# Patient Record
Sex: FEMALE | Race: WHITE | NOT HISPANIC OR LATINO | Employment: OTHER | URBAN - METROPOLITAN AREA
[De-identification: names, ages, dates, MRNs, and addresses within clinical notes are randomized per-mention and may not be internally consistent; named-entity substitution may affect disease eponyms.]

---

## 2017-02-08 DIAGNOSIS — Z01.419 ENCOUNTER FOR GYNECOLOGICAL EXAMINATION WITHOUT ABNORMAL FINDING: ICD-10-CM

## 2017-03-27 ENCOUNTER — ALLSCRIPTS OFFICE VISIT (OUTPATIENT)
Dept: OTHER | Facility: OTHER | Age: 72
End: 2017-03-27

## 2017-04-06 ENCOUNTER — GENERIC CONVERSION - ENCOUNTER (OUTPATIENT)
Dept: OTHER | Facility: OTHER | Age: 72
End: 2017-04-06

## 2017-05-04 ENCOUNTER — HOSPITAL ENCOUNTER (OUTPATIENT)
Dept: RADIOLOGY | Facility: MEDICAL CENTER | Age: 72
Discharge: HOME | End: 2017-05-04
Payer: COMMERCIAL

## 2017-05-04 DIAGNOSIS — Z01.419 ENCOUNTER FOR GYNECOLOGICAL EXAMINATION WITHOUT ABNORMAL FINDING: ICD-10-CM

## 2017-05-04 PROCEDURE — G0202 SCR MAMMO BI INCL CAD: HCPCS

## 2017-05-08 ENCOUNTER — ALLSCRIPTS OFFICE VISIT (OUTPATIENT)
Dept: OTHER | Facility: OTHER | Age: 72
End: 2017-05-08

## 2017-09-08 DIAGNOSIS — Z00.00 ENCOUNTER FOR GENERAL ADULT MEDICAL EXAMINATION WITHOUT ABNORMAL FINDINGS: ICD-10-CM

## 2017-09-13 ENCOUNTER — GENERIC CONVERSION - ENCOUNTER (OUTPATIENT)
Dept: OTHER | Facility: OTHER | Age: 72
End: 2017-09-13

## 2017-09-13 LAB
25(OH)D3 SERPL-MCNC: 46 NG/ML (ref 30–100)
A/G RATIO (HISTORICAL): 1.4 (CALC) (ref 1–2.5)
ALBUMIN SERPL BCP-MCNC: 4 G/DL (ref 3.6–5.1)
ALP SERPL-CCNC: 112 U/L (ref 33–130)
ALT SERPL W P-5'-P-CCNC: 18 U/L (ref 6–29)
AST SERPL W P-5'-P-CCNC: 20 U/L (ref 10–35)
BILIRUB SERPL-MCNC: 0.6 MG/DL (ref 0.2–1.2)
BUN SERPL-MCNC: 17 MG/DL (ref 7–25)
BUN/CREA RATIO (HISTORICAL): NORMAL (CALC) (ref 6–22)
CALCIUM (ADJUSTED FOR ALBUMIN) (HISTORICAL): 9.9 MG/DL (CALC) (ref 8.6–10.2)
CALCIUM SERPL-MCNC: 9.6 MG/DL (ref 8.6–10.4)
CHLORIDE SERPL-SCNC: 101 MMOL/L (ref 98–110)
CHOLEST SERPL-MCNC: 195 MG/DL
CHOLEST/HDLC SERPL: 3 (CALC)
CO2 SERPL-SCNC: 29 MMOL/L (ref 20–31)
CREAT SERPL-MCNC: 0.81 MG/DL (ref 0.6–0.93)
EGFR AFRICAN AMERICAN (HISTORICAL): 85 ML/MIN/1.73M2
EGFR-AMERICAN CALC (HISTORICAL): 73 ML/MIN/1.73M2
GAMMA GLOBULIN (HISTORICAL): 2.8 G/DL (CALC) (ref 1.9–3.7)
GLUCOSE (HISTORICAL): 87 MG/DL (ref 65–99)
HDLC SERPL-MCNC: 65 MG/DL
LDL CHOLESTEROL (HISTORICAL): 109 MG/DL (CALC)
NON-HDL-CHOL (CHOL-HDL) (HISTORICAL): 130 MG/DL (CALC)
POTASSIUM SERPL-SCNC: 3.8 MMOL/L (ref 3.5–5.3)
SODIUM SERPL-SCNC: 138 MMOL/L (ref 135–146)
T4 FREE SERPL-MCNC: 1.3 NG/DL (ref 0.8–1.8)
TOTAL PROTEIN (HISTORICAL): 6.8 G/DL (ref 6.1–8.1)
TRIGL SERPL-MCNC: 105 MG/DL
TSH SERPL DL<=0.05 MIU/L-ACNC: 5.06 MIU/L (ref 0.4–4.5)

## 2017-09-27 ENCOUNTER — GENERIC CONVERSION - ENCOUNTER (OUTPATIENT)
Dept: OTHER | Facility: OTHER | Age: 72
End: 2017-09-27

## 2017-09-27 PROCEDURE — G0145 SCR C/V CYTO,THINLAYER,RESCR: HCPCS | Performed by: OBSTETRICS & GYNECOLOGY

## 2017-09-28 ENCOUNTER — LAB REQUISITION (OUTPATIENT)
Dept: LAB | Facility: HOSPITAL | Age: 72
End: 2017-09-28
Payer: COMMERCIAL

## 2017-09-28 DIAGNOSIS — Z01.419 ENCOUNTER FOR GYNECOLOGICAL EXAMINATION WITHOUT ABNORMAL FINDING: ICD-10-CM

## 2017-10-16 LAB
LAB AP GYN PRIMARY INTERPRETATION: NORMAL
Lab: NORMAL

## 2017-10-17 ENCOUNTER — GENERIC CONVERSION - ENCOUNTER (OUTPATIENT)
Dept: OTHER | Facility: OTHER | Age: 72
End: 2017-10-17

## 2018-01-09 NOTE — RESULT NOTES
Verified Results  ECHO COMPLETE WITH CONTRAST IF INDICATED, TTE / TRANSTHORACIC [id number] 09:48AM Didi Saucedo     Test Name Result Flag Reference   ECHO COMPLETE WITH CONTRAST IF INDICATED (Report)     Benson Jackson   [address], [address] [facility]   [phone]     Transthoracic Echocardiogram   2D, M-mode, Doppler, and Color Doppler     Study date: 2016     Patient: Angelic Pollard   MR number: [id number]   Account number: [de-identified]   : 1945   Age: 72 years   Gender: Female   Status: Outpatient   Location: Bedside   Height: 61 in   Weight: 159 lb   BP: 140/ 62 mmHg     Indications: Palpitations  Diagnoses: R00 2 - Palpitations     Sonographer: ADORE Weinstein   Primary Physician: Franci Robb DO   Group: [facility]   Interpreting Physician: Mendel Coon, MD     SUMMARY     LEFT VENTRICLE:   Systolic function was normal by visual assessment  Ejection fraction was   estimated to be 70 %  There were no regional wall motion abnormalities  RIGHT VENTRICLE:   The size was normal    Systolic function was normal      MITRAL VALVE:   There was mild regurgitation  TRICUSPID VALVE:   There was mild to moderate regurgitation  PULMONIC VALVE:   There was trace regurgitation  IVC, HEPATIC VEINS:   Respirophasic changes were blunted (less than 50% variation)  HISTORY: PRIOR HISTORY: Risk factors: hypertension  PROCEDURE: The procedure was performed at the bedside  This was a routine   study  The transthoracic approach was used  The study included complete 2D   imaging, M-mode, complete spectral Doppler, and color Doppler  The heart rate   was 60 bpm, at the start of the study  Images were obtained from the   parasternal, apical, subcostal, and suprasternal notch acoustic windows  Echocardiographic views were limited due to poor acoustic window availability,   decreased penetration, and lung interference   This was a technically difficult   study  LEFT VENTRICLE: Size was normal  Systolic function was normal by visual   assessment  Ejection fraction was estimated to be 70 %  There were no regional   wall motion abnormalities  Wall thickness was normal  DOPPLER: Left ventricular   diastolic function parameters were normal      RIGHT VENTRICLE: The size was normal  Systolic function was normal  Wall   thickness was normal      LEFT ATRIUM: Size was normal      RIGHT ATRIUM: Size was normal      MITRAL VALVE: Valve structure was normal  There was normal leaflet separation  DOPPLER: The transmitral velocity was within the normal range  There was no   evidence for stenosis  There was mild regurgitation  AORTIC VALVE: The valve was trileaflet  Leaflets exhibited normal thickness and   normal cuspal separation  DOPPLER: Transaortic velocity was within the normal   range  There was no evidence for stenosis  There was no significant   regurgitation  TRICUSPID VALVE: The valve structure was normal  There was normal leaflet   separation  DOPPLER: The transtricuspid velocity was within the normal range  There was no evidence for stenosis  There was mild to moderate regurgitation  Estimated peak PA pressure was 25 mmHg  PULMONIC VALVE: Not well visualized  DOPPLER: The transpulmonic velocity was   within the normal range  There was trace regurgitation  PERICARDIUM: There was no pericardial effusion  The pericardium was normal in   appearance  AORTA: The root exhibited normal size  SYSTEMIC VEINS: IVC: The inferior vena cava was normal in size  Respirophasic   changes were blunted (less than 50% variation)       MEASUREMENT TABLES     OTHER ECHO MEASUREMENTS   (Reference normals)   Estimated CVP  8 mmHg  (--)     SYSTEM MEASUREMENT TABLES     2D mode   AV Diam: 3 5 cm   AoR Diam; Mean (2D): 3 5 cm   LA Diam (2D): 3 4 cm   LA Dimension; Mean (2D): 3 4 cm   LA/Ao (2D): 0 97   EDV (2D-Cubed): 41 8 cm3   EF (2D-Cubed): 77 5 %   ESV (2D-Cubed): 9 39 cm3   FS (2D-Cubed): 39 2 %   FS (2D-Teich): 39 2 %   IVS/LVPW (2D): 1 09   IVSd (2D): 0 9 cm   IVSd; Mean chosen (2D): 0 9 cm   LVIDd (2D): 3 47 cm   LVIDd; Mean (2D): 3 47 cm   LVIDs (2D): 2 11 cm   LVIDs; Mean (2D): 2 11 cm   LVPWd (2D): 0 83 cm   LVPWd; Mean (2D): 0 83 cm   Left Ventricular Ejection Fraction; Teichholz; 2D mode;: 70 7 %   Left Ventricular End Diastolic Volume; Teichholz; 2D mode;: 49 8 cm3   Left Ventricular End Systolic Volume; Teichholz; 2D mode;: 14 6 cm3   SV (2D-Cubed): 32 4 cm3   Stroke Volume; Teichholz; 2D mode;: 35 2 cm3   RVIDd (2D): 3 59 cm   RVIDd; Mean (2D): 3 59 cm     Apical four chamber   LV MOD Diam; Recent value; End Diastole (A4C): 1 85 cm   LV MOD Diam; Recent value; End Diastole (A4C): 4 14 cm   LV MOD Diam; Recent value; End Diastole (A4C): 3 84 cm   LV MOD Diam; Recent value; End Diastole (A4C): 4 24 cm   LV MOD Diam; Recent value; End Diastole (A4C): 4 26 cm   LV MOD Diam; Recent value; End Diastole (A4C): 4 21 cm   LV MOD Diam; Recent value; End Diastole (A4C): 4 03 cm   LV MOD Diam; Recent value; End Diastole (A4C): 1 5 cm   LV MOD Diam; Recent value; End Diastole (A4C): 2 24 cm   LV MOD Diam; Recent value; End Diastole (A4C): 2 71 cm   LV MOD Diam; Recent value; End Diastole (A4C): 3 03 cm   LV MOD Diam; Recent value; End Diastole (A4C): 3 27 cm   LV MOD Diam; Recent value; End Diastole (A4C): 3 52 cm   LV MOD Diam; Recent value; End Diastole (A4C): 3 74 cm   LV MOD Diam; Recent value; End Diastole (A4C): 3 89 cm   LV MOD Diam; Recent value; End Diastole (A4C): 3 96 cm   LV MOD Diam; Recent value; End Diastole (A4C): 3 99 cm   LV MOD Diam; Recent value; End Diastole (A4C): 4 11 cm   LV MOD Diam; Recent value; End Diastole (A4C): 4 24 cm   LV MOD Diam; Recent value; End Diastole (A4C): 4 26 cm   LV MOD Diam; Recent value; End Systole (A4C): 1 65 cm   LV MOD Diam; Recent value;  End Systole (A4C): 3 06 cm   LV MOD Diam; Recent value; End Systole (A4C): 3 06 cm   LV MOD Diam; Recent value; End Systole (A4C): 2 92 cm   LV MOD Diam; Recent value; End Systole (A4C): 2 72 cm   LV MOD Diam; Recent value; End Systole (A4C): 1 75 cm   LV MOD Diam; Recent value; End Systole (A4C): 2 02 cm   LV MOD Diam; Recent value; End Systole (A4C): 2 21 cm   LV MOD Diam; Recent value; End Systole (A4C): 2 36 cm   LV MOD Diam; Recent value; End Systole (A4C): 2 48 cm   LV MOD Diam; Recent value; End Systole (A4C): 2 55 cm   LV MOD Diam; Recent value; End Systole (A4C): 2 55 cm   LV MOD Diam; Recent value; End Systole (A4C): 2 6 cm   LV MOD Diam; Recent value; End Systole (A4C): 2 65 cm   LV MOD Diam; Recent value; End Systole (A4C): 0 83 cm   LV MOD Diam; Recent value; End Systole (A4C): 1 19 cm   LV MOD Diam; Recent value; End Systole (A4C): 1 51 cm   LV MOD Diam; Recent value; End Systole (A4C): 2 8 cm   LV MOD Diam; Recent value; End Systole (A4C): 3 01 cm   LV MOD Diam; Recent value; End Systole (A4C): 3 06 cm   LVEF MOD A4C: 68 8 %   Left Ventricle diastolic major axis; Most recent value chosen; Method of Disks,   Single Plane; 2D mode; Apical four chamber;: 7 88 cm Left Ventricle systolic   major axis; Most recent value chosen; Method of Disks, Single Plane; 2D mode; Apical four chamber;: 5 52 cm Left Ventricular Diastolic Area; Most recent   value chosen; Method of Disks, Single Plane; 2D mode; Apical four chamber;:   2810 mm2 Left Ventricular End Diastolic Volume; Most recent value chosen;   Method of Disks, Single Plane; 2D mode; Apical four chamber;: 82 2 cm3 Left   Ventricular End Systolic Volume; Most recent value chosen; Method of Disks,   Single Plane; 2D mode; Apical four chamber;: 25 7 cm3 Left Ventricular Systolic   Area; Most recent value chosen; Method of Disks, Single Plane; 2D mode;  Apical   four chamber;: 1300 mm2   SV MOD A4C: 56 6 cm3     Tissue Doppler Imaging   LV Peak Early Vo Tissue Zafar; Medial MA (TDI): 69 6 mm/s   Left Ventricular Peak Early Diastolic Tissue Velocity; Mean; Mean value chosen;   Medial Mitral Annulus; Tissue Doppler Imaging;: 69 6 mm/s     Unspecified Scan Mode   MV Peak Zafar/LV Peak Tissue Zafar E-Wave; Medial MA: 12 4   Dec Montour; Antegrade Flow: 5140 mm/s2   Dec Montour; Mean; Antegrade Flow: 5140 mm/s2   MV A Zafar: 762 mm/s   MV E Zafar: 863 mm/s   MV E/A Ratio: 1 1   MV Peak A Zafar: 762 mm/s   MV Peak E Zafar; Mean;  Antegrade Flow: 863 mm/s   Peak Grad; Mean; Regurgitant Flow: 21 mm[Hg]   Vmax; Mean; Regurgitant Flow: 2300 mm/s   Vmax; Regurgitant Flow: 2390 mm/s   Vmax; Regurgitant Flow: 2200 mm/s     [employer] Accredited Echocardiography Laboratory     Prepared and electronically signed by     Harrison Ellsworth MD   Signed [id number] 17:39:45

## 2018-01-10 NOTE — RESULT NOTES
Discussion/Summary   OVERALL GOOD  THYROID BUMPED UP SLIGHTLY AGAIN  DR Lefty Gallegos     Verified Results  (Q) COMPREHENSIVE METABOLIC PNL W/ADJUSTED CALCIUM [id number] 10:51AM Brianna Mora     Test Name Result Flag Reference   GLUCOSE 87 mg/dL  65-99   Fasting reference interval   UREA NITROGEN (BUN) 17 mg/dL  7-25   CREATININE 0 81 mg/dL  0 60-0 93   For patients >52years of age, the reference limit  for Creatinine is approximately 13% higher for people  identified as -American  eGFR NON-AFR  AMERICAN 73 mL/min/1 73m2  > OR = 60   eGFR AFRICAN AMERICAN 85 mL/min/1 73m2  > OR = 60   BUN/CREATININE RATIO   8-89   NOT APPLICABLE (calc)   SODIUM 138 mmol/L  135-146   POTASSIUM 3 8 mmol/L  3 5-5 3   CHLORIDE 101 mmol/L     CARBON DIOXIDE 29 mmol/L  20-31   CALCIUM 9 6 mg/dL  8 6-10 4   CALCIUM (ADJUSTED FOR$ALBUMIN) 9 9 mg/dL (calc)  8 6-10 2   PROTEIN, TOTAL 6 8 g/dL  6 1-8 1   ALBUMIN 4 0 g/dL  3 6-5 1   GLOBULIN 2 8 g/dL (calc)  1 9-3 7   ALBUMIN/GLOBULIN RATIO 1 4 (calc)  1 0-2 5   BILIRUBIN, TOTAL 0 6 mg/dL  0 2-1 2   ALKALINE PHOSPHATASE 112 U/L     AST 20 U/L  10-35   ALT 18 U/L  6-29     (Q) LIPID PANEL WITH REFLEX TO DIRECT LDL [id number] 10:51AM Brianna Mora     Test Name Result Flag Reference   CHOLESTEROL, TOTAL 195 mg/dL  <200   HDL CHOLESTEROL 65 mg/dL  >37   TRIGLICERIDES 547 mg/dL  <150   LDL-CHOLESTEROL 109 mg/dL (calc) H    Reference range: <100     Desirable range <100 mg/dL for patients with CHD or  diabetes and <70 mg/dL for diabetic patients with  known heart disease  LDL-C is now calculated using the Daniel-Blue   calculation, which is a validated novel method providing   better accuracy than the Friedewald equation in the   estimation of LDL-C  Estefani Blair  Stacy Phenes  1493;627(63): 2079-2863   (http://[place]/faq/OMW440)   CHOL/HDLC RATIO 3 0 (calc)  <5 0   NON HDL CHOLESTEROL 130 mg/dL (calc) H <130   For patients with diabetes plus 1 major ASCVD risk   factor, treating to a non-HDL-C goal of <100 mg/dL   (LDL-C of <70 mg/dL) is considered a therapeutic   option  (Q) TSH, 3RD GENERATION W/REFLEX TO FT4 [id number] 10:51AM Shannon Mistry   REPORT COMMENT:  ON HOLD  FASTING:YES     Test Name Result Flag Reference   TSH W/REFLEX TO FT4 5 06 mIU/L H 0 40-4 50   T4, FREE 1 3 ng/dL  0 8-1 8     *(Q) VITAMIN D, 25-HYDROXY, LC/MS/MS [id number] 10:51AM Iver Closs     Test Name Result Flag Reference   VITAMIN D, 25-OH, TOTAL 46 ng/mL     Vitamin D Status         25-OH Vitamin D:     Deficiency:                    <20 ng/mL  Insufficiency:             20 - 29 ng/mL  Optimal:                 > or = 30 ng/mL     For 25-OH Vitamin D testing on patients on   D2-supplementation and patients for whom quantitation   of D2 and D3 fractions is required, the QuestAssureD(TM)  25-OH VIT D, (D2,D3), LC/MS/MS is recommended: order   code 07066 (patients >2yrs)  For more information on this test, go to:  http://education  [employer]/faq/WSA291  (This link is being provided for   informational/educational purposes only )

## 2018-01-12 NOTE — RESULT NOTES
Verified Results  (1) THIN PREP PAP WITH IMAGING [id number] 01:31PM Maggi Mendoza     Test Name Result Flag Reference   LAB AP CASE REPORT (Report)     Gynecologic Cytology Report            Case: [id number]                  Authorizing Provider: Devante Nelson MD     Collected:      09/27/2017           First Screen:     CHANO Galdamez Received:      10/02/2017 6864        Specimen:  LIQUID-BASED PAP, SCREENING, Cervix   LAB AP GYN PRIMARY INTERPRETATION      Negative for intraepithelial lesion or malignancy  Electronically signed by CHANO Galdamez on 10/16/2017 at 1:31 PM   LAB AP GYN SPECIMEN ADEQUACY      Satisfactory for evaluation  (See note)   LAB AP GYN NOTE      No endocervical cells identified  It is difficult to differentiate between   squamous metaplastic cells and parabasal cells in atrophic specimens due   to numerous causes including menopause, postpartum changes and   progestational agents  LAB AP GYN ADDITIONAL INFORMATION (Report)     [place]'s FDA approved ,  and ThinPrep Imaging System are   utilized with strict adherence to the 's instruction manual to   prepare gynecologic and non-gynecologic cytology specimens for the   production of ThinPrep slides as well as for gynecologic ThinPrep imaging  These processes have been validated by our laboratory and/or by the     The Pap test is not a diagnostic procedure and should not be used as the   sole means to detect cervical cancer  It is only a screening procedure to   aid in the detection of cervical cancer and its precursors  Both   false-negative and false-positive results have been experienced  Your   patient's test result should be interpreted in this context together with   the history and clinical findings     LAB AP LMP

## 2018-01-14 VITALS
HEIGHT: 62 IN | BODY MASS INDEX: 29.65 KG/M2 | SYSTOLIC BLOOD PRESSURE: 134 MMHG | DIASTOLIC BLOOD PRESSURE: 68 MMHG | HEART RATE: 73 BPM | WEIGHT: 161.13 LBS

## 2018-01-16 NOTE — RESULT NOTES
Verified Results  * MAMMO SCREENING BILATERAL W CAD [id number] 11:47AM Sherrell Tafoya     Test Name Result Flag Reference   MAMMO SCREENING BILATERAL W CAD (Report)     Patient History:   Patient is postmenopausal and has history of other cancer at age    40  Family history of lung and melanoma cancer in brother at age 48    or over  2 benign excisional biopsies of the left breast, 1989  2 benign    excisional biopsies of the right breast, 1980  Patient has never smoked  Patient's BMI is 28 5  Reason for exam: screening (asymptomatic)  Screening     Mammo Screening Bilateral W CAD: May 2, 2016 - Check In #:    [de-identified]   Bilateral MLO, CC, and XCCL view(s) were taken  Technologist: LUCY Hoyos (R)(M)   Prior study comparison: April 21, 2015, digital bilateral    screening mammogram performed at [address] [place] [address]  April 14, 2014, digital bilateral screening mammogram performed    at [address] [place] [address]  April 10, 2013, digital    bilateral screening mammogram performed at [facility]  April 4, 2012, digital bilateral screening mammogram    performed at [address] [place] [address]  October 3, 2011, right   breast unilateral diagnostic mammogram, performed at [facility]  March 22, 2011, bilateral WB digitl    bilat miguel, performed at [facility] [address] [facility] [address]  July 14, 2010, right breast unilateral diagnostic mammogram, performed   at [facility] [address] [facility] [address]  December 8, 2009, right    breast unilateral diagnostic mammogram, performed at [facility] [address] [facility] [address]  December 1, 2009, bilateral digital    screening mammogram, performed at [place] [address]      There are scattered fibroglandular densities  Sharply    circumscribed nodule in the outer left breast, middle one 3rd    depth is stable at least as far back as March 2011 and can be    considered benign   Scattered benign-appearing calcifications are   again noted bilaterally, unchanged from previous examinations  There are no new dominant masses, foci of architectural    distortion or suspicious clusters of calcification in either    breast to suggest malignancy  ASSESSMENT: BiRad:2 - Benign     Recommendation:   Routine screening mammogram of both breasts in 1 year  A reminder letter will be scheduled   Analyzed by CAD     8-10% of cancers will be missed on mammography  Management of a    palpable abnormality must be based on clinical grounds  Patients   will be notified of their results via letter from our facility  Accredited by [employer] of [employer] and FDA       Transcription Location:  [facility]: [id number]     Risk Value(s):   Tyrer-Cuzick 10 Year: 3 431%, Tyrer-Cuzick Lifetime: 5 843%,    Myriad Table: 1 5%, TU 5 Year: 3 1%, NCI Lifetime: 9 3%

## 2018-01-16 NOTE — RESULT NOTES
Verified Results  (1) COMPREHENSIVE METABOLIC PANEL [id number] 60:77AN Marlen Tolentino     Test Name Result Flag Reference   GLUCOSE 91 mg/dL  65-99   Fasting reference interval   UREA NITROGEN (BUN) 13 mg/dL  7-25   CREATININE 0 70 mg/dL  0 60-0 93   For patients >52years of age, the reference limit  for Creatinine is approximately 13% higher for people  identified as -American  eGFR NON-AFR  AMERICAN 88 mL/min/1 73m2  > OR = 60   eGFR AFRICAN AMERICAN 102 mL/min/1 73m2  > OR = 60   BUN/CREATININE RATIO   1-85   NOT APPLICABLE (calc)   ALT 18 U/L  6-29   ALBUMIN 4 1 g/dL  3 6-5 1   GLOBULIN 2 6 g/dL (calc)  1 9-3 7   ALBUMIN/GLOBULIN RATIO 1 6 (calc)  1 0-2 5   BILIRUBIN, TOTAL 0 4 mg/dL  0 2-1 2   ALKALINE PHOSPHATASE 109 U/L     AST 19 U/L  10-35   SODIUM 138 mmol/L  135-146   POTASSIUM 3 7 mmol/L  3 5-5 3   CHLORIDE 102 mmol/L     CARBON DIOXIDE 25 mmol/L  19-30   CALCIUM 9 3 mg/dL  8 6-10 4   PROTEIN, TOTAL 6 7 g/dL  6 1-8 1     (Q) LIPID PANEL WITH REFLEX TO DIRECT LDL [id number] 11:03AM Marlen [employer]     Test Name Result Flag Reference   CHOLESTEROL, TOTAL 201 mg/dL H 125-200   HDL CHOLESTEROL 67 mg/dL  > OR = 46   TRIGLICERIDES 490 mg/dL  <150   LDL-CHOLESTEROL 113 mg/dL (calc)  <130   Desirable range <100 mg/dL for patients with CHD or  diabetes and <70 mg/dL for diabetic patients with  known heart disease  CHOL/HDLC RATIO 3 0 (calc)  < OR = 5 0   NON HDL CHOLESTEROL 134 mg/dL (calc)     Target for non-HDL cholesterol is 30 mg/dL higher than   LDL cholesterol target  (Q) TSH, 3RD GENERATION W/REFLEX TO FT4 [id number] 11:03AM Marlen [employer]   REPORT COMMENT:  FASTING:YES     Test Name Result Flag Reference   TSH W/REFLEX TO FT4 4 27 mIU/L  0 40-4 50       Discussion/Summary   OVERALL GOOD  WILL DISCUSS IN OFFICE     Sole Casarez

## 2018-01-16 NOTE — RESULT NOTES
Verified Results  (Q) COMPREHENSIVE METABOLIC PNL W/ADJUSTED CALCIUM [id number] 11:31AM Hanna Peat     Test Name Result Flag Reference   GLUCOSE 90 mg/dL  65-99   Fasting reference interval   UREA NITROGEN (BUN) 17 mg/dL  7-25   CREATININE 0 73 mg/dL  0 50-0 99   For patients >52years of age, the reference limit  for Creatinine is approximately 13% higher for people  identified as -American  eGFR NON-AFR  AMERICAN 84 mL/min/1 73m2  > OR = 60   eGFR AFRICAN AMERICAN 97 mL/min/1 73m2  > OR = 60   BUN/CREATININE RATIO   3-26   NOT APPLICABLE (calc)   SODIUM 139 mmol/L  135-146   POTASSIUM 4 0 mmol/L  3 5-5 3   CHLORIDE 102 mmol/L     CARBON DIOXIDE 29 mmol/L  19-30   CALCIUM 9 3 mg/dL  8 6-10 4   CALCIUM (ADJUSTED FOR$ALBUMIN) 9 4 mg/dL (calc)  8 6-10 2   PROTEIN, TOTAL 7 0 g/dL  6 1-8 1   ALBUMIN 4 3 g/dL  3 6-5 1   GLOBULIN 2 7 g/dL (calc)  1 9-3 7   ALBUMIN/GLOBULIN RATIO 1 6 (calc)  1 0-2 5   BILIRUBIN, TOTAL 0 5 mg/dL  0 2-1 2   ALKALINE PHOSPHATASE 107 U/L     AST 20 U/L  10-35   ALT 15 U/L  6-29     (Q) LIPID PANEL WITH REFLEX TO DIRECT LDL [id number] 11:31AM Hanna Peat     Test Name Result Flag Reference   CHOLESTEROL, TOTAL 210 mg/dL H 125-200   HDL CHOLESTEROL 67 mg/dL  > OR = 46   TRIGLICERIDES 974 mg/dL  <150   LDL-CHOLESTEROL 121 mg/dL (calc)  <130   Desirable range <100 mg/dL for patients with CHD or  diabetes and <70 mg/dL for diabetic patients with  known heart disease  CHOL/HDLC RATIO 3 1 (calc)  < OR = 5 0   NON HDL CHOLESTEROL 143 mg/dL (calc)     Target for non-HDL cholesterol is 30 mg/dL higher than   LDL cholesterol target       (Q) TSH, 3RD GENERATION W/REFLEX TO FT4 [id number] 11:31AM Hanna Peat   REPORT COMMENT:  FASTING:YES     Test Name Result Flag Reference   T4, FREE 1 0 ng/dL  0 8-1 8   TSH W/REFLEX TO FT4 5 79 mIU/L H 0 40-4 50     *(Q) VITAMIN D, 25-HYDROXY, LC/MS/MS [id number] 11:31AM Hanna Hannah     Test Name Result Flag Reference   VITAMIN D, 25-OH, TOTAL 35 ng/mL     Vitamin D Status         25-OH Vitamin D:     Deficiency:                    <20 ng/mL  Insufficiency:             20 - 29 ng/mL  Optimal:                 > or = 30 ng/mL     For 25-OH Vitamin D testing on patients on   D2-supplementation and patients for whom quantitation   of D2 and D3 fractions is required, the QuestAssureD(TM)  25-OH VIT D, (D2,D3), LC/MS/MS is recommended: order   code 66963 (patients >2yrs)  For more information on this test, go to:  http://education  [place]/faq/KTU800  (This link is being provided for   informational/educational purposes only )       Discussion/Summary   OVERALL VERY GOOD  WILL DISCUSS AT NEXT VISIT     Roya Santa

## 2018-01-22 VITALS
RESPIRATION RATE: 16 BRPM | DIASTOLIC BLOOD PRESSURE: 78 MMHG | WEIGHT: 159.25 LBS | BODY MASS INDEX: 29.3 KG/M2 | HEART RATE: 72 BPM | HEIGHT: 62 IN | SYSTOLIC BLOOD PRESSURE: 148 MMHG

## 2018-01-22 VITALS
DIASTOLIC BLOOD PRESSURE: 62 MMHG | SYSTOLIC BLOOD PRESSURE: 124 MMHG | HEIGHT: 62 IN | BODY MASS INDEX: 28.89 KG/M2 | WEIGHT: 157 LBS

## 2018-03-04 DIAGNOSIS — I10 ESSENTIAL HYPERTENSION: Primary | ICD-10-CM

## 2018-03-04 RX ORDER — VALSARTAN AND HYDROCHLOROTHIAZIDE 320; 25 MG/1; MG/1
TABLET, FILM COATED ORAL
Qty: 90 TABLET | Refills: 3 | Status: SHIPPED | OUTPATIENT
Start: 2018-03-04 | End: 2018-03-06 | Stop reason: SDUPTHER

## 2018-03-06 DIAGNOSIS — I10 ESSENTIAL HYPERTENSION: ICD-10-CM

## 2018-03-06 RX ORDER — VALSARTAN AND HYDROCHLOROTHIAZIDE 320; 25 MG/1; MG/1
TABLET, FILM COATED ORAL
Qty: 30 TABLET | Refills: 0 | Status: SHIPPED | OUTPATIENT
Start: 2018-03-06 | End: 2018-04-03 | Stop reason: SDUPTHER

## 2018-04-03 ENCOUNTER — OFFICE VISIT (OUTPATIENT)
Dept: FAMILY MEDICINE CLINIC | Facility: CLINIC | Age: 73
End: 2018-04-03
Payer: COMMERCIAL

## 2018-04-03 VITALS
DIASTOLIC BLOOD PRESSURE: 66 MMHG | SYSTOLIC BLOOD PRESSURE: 130 MMHG | BODY MASS INDEX: 29.55 KG/M2 | HEART RATE: 72 BPM | HEIGHT: 62 IN | RESPIRATION RATE: 14 BRPM | WEIGHT: 160.6 LBS | TEMPERATURE: 98.2 F

## 2018-04-03 DIAGNOSIS — I10 ESSENTIAL HYPERTENSION: ICD-10-CM

## 2018-04-03 DIAGNOSIS — E03.8 SUBCLINICAL HYPOTHYROIDISM: ICD-10-CM

## 2018-04-03 DIAGNOSIS — E55.9 VITAMIN D DEFICIENCY: ICD-10-CM

## 2018-04-03 DIAGNOSIS — Z00.00 WELLNESS EXAMINATION: Primary | ICD-10-CM

## 2018-04-03 DIAGNOSIS — Z12.11 SCREENING FOR COLON CANCER: ICD-10-CM

## 2018-04-03 PROCEDURE — 1101F PT FALLS ASSESS-DOCD LE1/YR: CPT | Performed by: NURSE PRACTITIONER

## 2018-04-03 PROCEDURE — 3725F SCREEN DEPRESSION PERFORMED: CPT | Performed by: NURSE PRACTITIONER

## 2018-04-03 PROCEDURE — G0439 PPPS, SUBSEQ VISIT: HCPCS | Performed by: NURSE PRACTITIONER

## 2018-04-03 RX ORDER — METRONIDAZOLE 10 MG/G
GEL TOPICAL
COMMUNITY
Start: 2015-06-09

## 2018-04-03 RX ORDER — ACETAMINOPHEN 160 MG
1 TABLET,DISINTEGRATING ORAL DAILY
COMMUNITY

## 2018-04-03 RX ORDER — VALSARTAN AND HYDROCHLOROTHIAZIDE 320; 25 MG/1; MG/1
1 TABLET, FILM COATED ORAL DAILY
Qty: 90 TABLET | Refills: 3 | Status: SHIPPED | OUTPATIENT
Start: 2018-04-03 | End: 2019-03-21 | Stop reason: SDUPTHER

## 2018-04-03 RX ORDER — MULTIVITAMIN WITH IRON
250 TABLET ORAL DAILY PRN
COMMUNITY

## 2018-04-03 NOTE — PROGRESS NOTES
HPI:  John Leslie is a 71 y o  female here for her Subsequent Wellness Visit  There is no problem list on file for this patient  Past Medical History:   Diagnosis Date    Osteoporosis     Rosacea      Past Surgical History:   Procedure Laterality Date    ADENOIDECTOMY      HEMORRHOID SURGERY      INCISIONAL BREAST BIOPSY Left     OTHER SURGICAL HISTORY      Arthrocentesis Aspiration of Ganglion Cyst    TONSILLECTOMY      UTERINE FIBROID EMBOLIZATION       Family History   Problem Relation Age of Onset    Hypertension Mother     Coronary artery disease Father     Diverticulitis Father      Colon    Stroke Father      Syndrome    Venous thrombosis Father      of the deep Vessels of the distal lower extremity    Diabetes Brother     Lung cancer Brother     Venous thrombosis Brother      of the deep vessels of the distal Lower extremity    Diabetes Paternal Grandfather     Lung cancer Family      Adenocarcinoma of the Lung    Other Family      Disorders of Blood and Blood-forming Organs, Skin Disorder    Heart disease Family      History   Smoking Status    Never Smoker   Smokeless Tobacco    Never Used     History   Alcohol Use    Yes     Comment: social drinker      History   Drug Use No     /66   Pulse 72   Temp 98 2 °F (36 8 °C)   Resp 14   Ht 5' 2 13" (1 578 m)   Wt 72 8 kg (160 lb 9 6 oz)   Breastfeeding? No   BMI 29 26 kg/m²       Current Outpatient Prescriptions   Medication Sig Dispense Refill    valsartan-hydrochlorothiazide (DIOVAN-HCT) 320-25 MG per tablet TAKE 1 TABLET BY MOUTH ONCE DAILY 30 tablet 0     No current facility-administered medications for this visit        Allergies   Allergen Reactions    Other     Tetanus Antitoxin Hives    Penicillins Rash     Immunization History   Administered Date(s) Administered    Influenza Quadrivalent Preservative Free 3 years and older IM 09/30/2016    Influenza Split High Dose Preservative Free IM 09/25/2015    Pneumococcal Conjugate 13-Valent 06/09/2015    Pneumococcal Polysaccharide PPV23 12/02/2011    Zoster 12/10/2012       Patient Care Team:  Bhavin Peña DO as PCP - MD Russell Cormier MD      Medicare Screening Tests and Risk Assessments:  AWV Clinical     ISAR:       Once in a Lifetime Medicare Screening:       Medicare Screening Tests and Risk Assessment:   AAA Risk Assessment    None Indicated:  Yes    Osteoporosis Risk Assessment     Female:  Yes   :  Yes :  No   Age over 48:  Yes Low body weight (<127lbs):  No   Tobacco use:  No Alcohol use:  No   Low calcium diet:  No PMHX of fractures:  No   FHX of fractures:  No    HIV Risk Assessment    None indicated:  Yes        Drug and Alcohol Use:   Tobacco use    Cigarettes:  never smoker    Smokeless:  never used smokeless tobacco    Tobacco use duration    Tobacco Cessation Readiness    Alcohol use    Alcohol use:  rare use    Alcohol Treatment Readiness   Illicit Drug Use    Drug use:  never    Drug type:  no sedative use       Diet & Exercise:   Diet   What is your diet?:  Regular   How many servings a day of the following:   Fruits and Vegetables:  1-2 Meat:  1-2, 0   Whole Grains:  2 Simple Carbs:  2   Dairy:  2, 1 Soda:  0   Coffee:  1 Tea:  2   Exercise    Do you currently exercise?:  currently not exercising       Cognitive Impairment Screening:   Anxiety screenings preformed:   Yes Anxiety screen score:  0   Depression screening preformed:  Yes Depression screen score:  0    PHQ-9 Depression scale score:  0   Depression screening results:  negative for symptoms   Cognitive Impairment Screening    Do you have difficulty learning or retaining new information?:  No Do you have difficulty handling new tasks?:  No   Do you have difficulty with reasoning?:  No Do you have difficulty with spatial ability and orientation?:  No   Do you have difficulty with language?:  No Do you have difficulty with behavior?: No       Functional Ability/Level of Safety:   Hearing    Hearing difficulties:  No Bilateral:  normal   Hearing Impairment Assessment    Hearing status:  No impairment   Current Activities    Status:  unlimited ADL's, unlimited IADL's, unlimited social activities   Help needed with the folllowing:    Using the phone:  No Transportation:  No   Shopping:  No Preparing Meals:  No   Doing Housework:  No Doing Laundry:  No   Managing Medications:  No Managing Money:  No   ADL    Feeding:  Independant   Oral hygiene and Facial grooming:  Independant   Bathing:  Independant   Upper Body Dressing:  Independant   Lower Body Dressing:  Independant   Toileting:  Independant   Bed Mobility:  Independant   Fall Risk   Have you fallen in the last 12 months?:  No Are you unsteady on your feet?:  No    Are you taking any medications that may cause fatigue or dizziness?:  No    Do you rush to the bathroom potentially risking a fall?:  No   Injury History   Polypharmacy:  No Antidepressant Use:  No   Sedative Use:  No Antihypertensive Use:  Yes   Previous Fall:  No Alcohol Use:  No   Deconditioning:  No Visual Impairment:  No   Cogitive Impairment:  No Mmobility Impairment:  No   Postural Hypotension:  No Urinary Incontinence:  No       Home Safety:   Are there hazards in your environment?:  No   If you fell, would you need help to get back up from the ground?:  No Do you have problems or concerns getting in/out of a bed, chair, tub, or toilet?:  No   Do you feel unsteady when walking?:  No Is your activity limited by pain?:  No   Do you have handrails and grab-bars in the home?:  No Are emergency numbers kept by the phone and regularly updated?:  Yes   Are you and/or family members aware of the dangers of smoking in bed?:  No Are firearms stored securely?:  No   Do you have working smoke alarms and fire extinguisher?:  Yes Do all household members know how to use them?:  Yes   Have you left the stove on unsupervised?:  No    Home Safety Risk Factors   Unfamilar with surroundings:  No Uneven floors:  No   Stairs or handrail saftey risk:  No Loose rugs:  No   Household clutter:  No Poor household lighting:  No   No grab bars in bathroom:  No Further evaluation needed:  No       Advanced Directives:   Advanced Directives    Patient's End of Life Decisions        Urinary Incontinence:       Glaucoma:            Provider Screening    No data filed        No exam data present  Reviewed Updated [facility] Prior Wellness Visits:   Last Medicare wellness visit information was reviewed, patient interviewed , no change since last AWVno  Last Medicare wellness visit information was reviewed, patient interviewed and updates made to the record today yes    Assessment and Plan:  No diagnosis found  annual medicare wellness exam     Health Maintenance Due   Topic Date Due    Hepatitis C Screening  1945    SLP PLAN OF CARE  1945    COLONOSCOPY  1945    DTaP,Tdap,and Td Vaccines (1 - Tdap) 07/10/1967    Urinary Incontinence Screening  07/10/2011    GLAUCOMA SCREENING 67+ YR  07/10/2013    INFLUENZA VACCINE  09/01/2017     Physical Exam   Constitutional: She is oriented to person, place, and time  She appears well-developed and well-nourished  HENT:   Head: Normocephalic and atraumatic  Right Ear: External ear normal    Left Ear: External ear normal    Nose: Nose normal    Mouth/Throat: Oropharynx is clear and moist    Eyes: Conjunctivae and EOM are normal  Pupils are equal, round, and reactive to light  Neck: Normal range of motion  Neck supple  Cardiovascular: Normal rate, regular rhythm, normal heart sounds and intact distal pulses  Pulmonary/Chest: Effort normal and breath sounds normal    Abdominal: Soft  Bowel sounds are normal    Musculoskeletal: Normal range of motion  Neurological: She is alert and oriented to person, place, and time  Skin: Skin is warm and dry  Capillary refill takes less than 2 seconds  Psychiatric: She has a normal mood and affect  Her behavior is normal  Thought content normal    Nursing note and vitals reviewed

## 2018-05-05 DIAGNOSIS — Z12.31 ENCOUNTER FOR SCREENING MAMMOGRAM FOR MALIGNANT NEOPLASM OF BREAST: ICD-10-CM

## 2018-05-07 ENCOUNTER — HOSPITAL ENCOUNTER (OUTPATIENT)
Dept: RADIOLOGY | Facility: MEDICAL CENTER | Age: 73
Discharge: HOME | End: 2018-05-07
Payer: COMMERCIAL

## 2018-05-07 DIAGNOSIS — Z12.31 ENCOUNTER FOR SCREENING MAMMOGRAM FOR MALIGNANT NEOPLASM OF BREAST: ICD-10-CM

## 2018-05-07 PROCEDURE — 77067 SCR MAMMO BI INCL CAD: CPT

## 2018-07-12 ENCOUNTER — OFFICE VISIT (OUTPATIENT)
Dept: CARDIOLOGY CLINIC | Facility: CLINIC | Age: 73
End: 2018-07-12
Payer: COMMERCIAL

## 2018-07-12 VITALS
HEART RATE: 64 BPM | WEIGHT: 159 LBS | SYSTOLIC BLOOD PRESSURE: 132 MMHG | DIASTOLIC BLOOD PRESSURE: 70 MMHG | HEIGHT: 62 IN | BODY MASS INDEX: 29.26 KG/M2

## 2018-07-12 DIAGNOSIS — I10 ESSENTIAL HYPERTENSION: ICD-10-CM

## 2018-07-12 DIAGNOSIS — R00.2 PALPITATION: Primary | ICD-10-CM

## 2018-07-12 DIAGNOSIS — R00.2 PALPITATIONS: ICD-10-CM

## 2018-07-12 PROCEDURE — 93000 ELECTROCARDIOGRAM COMPLETE: CPT | Performed by: INTERNAL MEDICINE

## 2018-07-12 PROCEDURE — 4040F PNEUMOC VAC/ADMIN/RCVD: CPT | Performed by: INTERNAL MEDICINE

## 2018-07-12 PROCEDURE — 99213 OFFICE O/P EST LOW 20 MIN: CPT | Performed by: INTERNAL MEDICINE

## 2018-08-04 NOTE — PROGRESS NOTES
EPS Progress Note - Genna Chaudhry 68 y o  female MRN: [id number]           ASSESSMENT:  1  Palpitation  POCT ECG   2  Palpitations     3  Essential hypertension     4  Hx pre-syncope        PLAN:  If she continues to have intermittent palpitations will order monitor she will call if this occurs  HTN well controlled  No pre-syncope  F/u one year    HPI:   Interim history she gets occasional palpitations  They are fleeting  She has not felt like she will pass out  one episode of palpitations are           ROS:  Negative  positive for occasional palpitations all other 12 point ROS Negative    Objective:     Vitals: Blood pressure 132/70, pulse 64, height 5' 2" (1 575 m), weight 72 1 kg (159 lb), not currently breastfeeding , Body mass index is 29 08 kg/m²  ,        Physical Exam:    GEN: Genna Chaudhry appears well, alert and oriented x 3, pleasant and cooperative   HEENT: pupils equal, round, and reactive to light; extraocular muscles intact  NECK: supple, no carotid bruits   HEART: regular rhythm, normal S1 and S2, no murmurs, clicks, gallops or rubs   LUNGS: clear to auscultation bilaterally; no wheezes, rales, or rhonchi   ABDOMEN: normal bowel sounds, soft, no tenderness, no distention  EXTREMITIES: peripheral pulses normal; no clubbing, cyanosis, or edema  NEURO: no focal findings   SKIN: normal without suspicious lesions on exposed skin    Medications:      Current Outpatient Prescriptions:     aspirin (ASPIRIN LOW DOSE) 81 MG tablet, Take 1 tablet by mouth daily, Disp: , Rfl:     Cholecalciferol (VITAMIN D3) 2000 units capsule, Take 1 tablet by mouth daily, Disp: , Rfl:     Magnesium 250 MG TABS, Take 250 tablets by mouth daily as needed, Disp: , Rfl:     metroNIDAZOLE (METROGEL) 1 % gel, Apply topically, Disp: , Rfl:     Multiple Vitamin-Folic Acid TABS, Take 1 tablet by mouth daily, Disp: , Rfl:     Psyllium (METAMUCIL) 28 3 % POWD, Take by mouth, Disp: , Rfl:    valsartan-hydrochlorothiazide (DIOVAN-HCT) 320-25 MG per tablet, Take 1 tablet by mouth daily, Disp: 90 tablet, Rfl: 3     Family History:   Family History   Problem Relation Age of Onset    Hypertension Mother     Coronary artery disease Father     Diverticulitis Father         Colon    Stroke Father         Syndrome    Venous thrombosis Father         of the deep Vessels of the distal lower extremity    Diabetes Brother     Lung cancer Brother     Venous thrombosis Brother         of the deep vessels of the distal Lower extremity    Diabetes Paternal Grandfather     Lung cancer Family         Adenocarcinoma of the Lung    Other Family         Disorders of Blood and Blood-forming Organs, Skin Disorder    Heart disease Family        Labs & Results:  Below is the patient's most recent value for Albumin, ALT, AST, BUN, Calcium, Chloride, Cholesterol, CO2, Creatinine, GFR, Glucose, HDL, Hematocrit, Hemoglobin, Hemoglobin A1C, LDL, Magnesium, Phosphorus, Platelets, Potassium, PSA, Sodium, Triglycerides, and WBC  Lab Results   Component Value Date    ALT 18 2017    AST 20 2017    BUN 17 2017    CALCIUM 9 6 2017     2017    CHOL 195 2017    CO2 29 2017    CREATININE 0 81 2017    HDL 65 2017    MG 2 1 2016    K 3 8 2017     2017    TRIG 105 2017     Note: for a comprehensive list of the patient's lab results, access the Results Review activity           Cardiac testing:   Results for orders placed during the hospital encounter of 16   Echo complete with contrast if indicated    Narrative [facility] [address]  [address]  [phone]    Transthoracic Echocardiogram  2D, M-mode, Doppler, and Color Doppler    Study date:  2016    Patient: Chan Carty  MR number: [id number]  Account number: [de-identified]  : 1945  Age: 72 years  Gender: Female  Status: Outpatient  Location: Bedside  Height: 61 in  Weight: 159 lb  BP: 140/ 62 mmHg    Indications: Palpitations  Diagnoses: R00 2 - Palpitations    Sonographer:  ADORE Weinstein  Primary Physician:  Franci Robb DO  Group:  [facility]  Interpreting Physician:  Mendel Coon, MD    SUMMARY    LEFT VENTRICLE:  Systolic function was normal by visual assessment  Ejection fraction was  estimated to be 70 %  There were no regional wall motion abnormalities  RIGHT VENTRICLE:  The size was normal   Systolic function was normal     MITRAL VALVE:  There was mild regurgitation  TRICUSPID VALVE:  There was mild to moderate regurgitation  PULMONIC VALVE:  There was trace regurgitation  IVC, HEPATIC VEINS:  Respirophasic changes were blunted (less than 50% variation)  HISTORY: PRIOR HISTORY: Risk factors: hypertension  PROCEDURE: The procedure was performed at the bedside  This was a routine  study  The transthoracic approach was used  The study included complete 2D  imaging, M-mode, complete spectral Doppler, and color Doppler  The heart rate  was 60 bpm, at the start of the study  Images were obtained from the  parasternal, apical, subcostal, and suprasternal notch acoustic windows  Echocardiographic views were limited due to poor acoustic window availability,  decreased penetration, and lung interference  This was a technically difficult  study  LEFT VENTRICLE: Size was normal  Systolic function was normal by visual  assessment  Ejection fraction was estimated to be 70 %  There were no regional  wall motion abnormalities  Wall thickness was normal  DOPPLER: Left ventricular  diastolic function parameters were normal     RIGHT VENTRICLE: The size was normal  Systolic function was normal  Wall  thickness was normal     LEFT ATRIUM: Size was normal     RIGHT ATRIUM: Size was normal     MITRAL VALVE: Valve structure was normal  There was normal leaflet separation    DOPPLER: The transmitral velocity was within the normal range  There was no  evidence for stenosis  There was mild regurgitation  AORTIC VALVE: The valve was trileaflet  Leaflets exhibited normal thickness and  normal cuspal separation  DOPPLER: Transaortic velocity was within the normal  range  There was no evidence for stenosis  There was no significant  regurgitation  TRICUSPID VALVE: The valve structure was normal  There was normal leaflet  separation  DOPPLER: The transtricuspid velocity was within the normal range  There was no evidence for stenosis  There was mild to moderate regurgitation  Estimated peak PA pressure was 25 mmHg  PULMONIC VALVE: Not well visualized  DOPPLER: The transpulmonic velocity was  within the normal range  There was trace regurgitation  PERICARDIUM: There was no pericardial effusion  The pericardium was normal in  appearance  AORTA: The root exhibited normal size  SYSTEMIC VEINS: IVC: The inferior vena cava was normal in size  Respirophasic  changes were blunted (less than 50% variation)  MEASUREMENT TABLES    OTHER ECHO MEASUREMENTS  (Reference normals)  Estimated CVP   8 mmHg   (--)    SYSTEM MEASUREMENT TABLES    2D mode  AV Diam: 3 5 cm  AoR Diam; Mean (2D): 3 5 cm  LA Diam (2D): 3 4 cm  LA Dimension; Mean (2D): 3 4 cm  LA/Ao (2D): 0 97  EDV (2D-Cubed): 41 8 cm3  EF (2D-Cubed): 77 5 %  ESV (2D-Cubed): 9 39 cm3  FS (2D-Cubed): 39 2 %  FS (2D-Teich): 39 2 %  IVS/LVPW (2D): 1 09  IVSd (2D): 0 9 cm  IVSd; Mean chosen (2D): 0 9 cm  LVIDd (2D): 3 47 cm  LVIDd; Mean (2D): 3 47 cm  LVIDs (2D): 2 11 cm  LVIDs; Mean (2D): 2 11 cm  LVPWd (2D): 0 83 cm  LVPWd; Mean (2D): 0 83 cm  Left Ventricular Ejection Fraction; Teichholz; 2D mode;: 70 7 %  Left Ventricular End Diastolic Volume; Teichholz; 2D mode;: 49 8 cm3  Left Ventricular End Systolic Volume; Teichholz; 2D mode;: 14 6 cm3  SV (2D-Cubed): 32 4 cm3  Stroke Volume;  Teichholz; 2D mode;: 35 2 cm3  RVIDd (2D): 3 59 cm  RVIDd; Mean (2D): 3 59 cm    Apical four chamber  LV MOD Diam; Recent value; End Diastole (A4C): 1 85 cm  LV MOD Diam; Recent value; End Diastole (A4C): 4 14 cm  LV MOD Diam; Recent value; End Diastole (A4C): 3 84 cm  LV MOD Diam; Recent value; End Diastole (A4C): 4 24 cm  LV MOD Diam; Recent value; End Diastole (A4C): 4 26 cm  LV MOD Diam; Recent value; End Diastole (A4C): 4 21 cm  LV MOD Diam; Recent value; End Diastole (A4C): 4 03 cm  LV MOD Diam; Recent value; End Diastole (A4C): 1 5 cm  LV MOD Diam; Recent value; End Diastole (A4C): 2 24 cm  LV MOD Diam; Recent value; End Diastole (A4C): 2 71 cm  LV MOD Diam; Recent value; End Diastole (A4C): 3 03 cm  LV MOD Diam; Recent value; End Diastole (A4C): 3 27 cm  LV MOD Diam; Recent value; End Diastole (A4C): 3 52 cm  LV MOD Diam; Recent value; End Diastole (A4C): 3 74 cm  LV MOD Diam; Recent value; End Diastole (A4C): 3 89 cm  LV MOD Diam; Recent value; End Diastole (A4C): 3 96 cm  LV MOD Diam; Recent value; End Diastole (A4C): 3 99 cm  LV MOD Diam; Recent value; End Diastole (A4C): 4 11 cm  LV MOD Diam; Recent value; End Diastole (A4C): 4 24 cm  LV MOD Diam; Recent value; End Diastole (A4C): 4 26 cm  LV MOD Diam; Recent value; End Systole (A4C): 1 65 cm  LV MOD Diam; Recent value; End Systole (A4C): 3 06 cm  LV MOD Diam; Recent value; End Systole (A4C): 3 06 cm  LV MOD Diam; Recent value; End Systole (A4C): 2 92 cm  LV MOD Diam; Recent value; End Systole (A4C): 2 72 cm  LV MOD Diam; Recent value; End Systole (A4C): 1 75 cm  LV MOD Diam; Recent value; End Systole (A4C): 2 02 cm  LV MOD Diam; Recent value; End Systole (A4C): 2 21 cm  LV MOD Diam; Recent value; End Systole (A4C): 2 36 cm  LV MOD Diam; Recent value; End Systole (A4C): 2 48 cm  LV MOD Diam; Recent value; End Systole (A4C): 2 55 cm  LV MOD Diam; Recent value; End Systole (A4C): 2 55 cm  LV MOD Diam; Recent value; End Systole (A4C): 2 6 cm  LV MOD Diam; Recent value;  End Systole (A4C): 2 65 cm  LV MOD Diam; Recent value; End Systole (A4C): 0 83 cm  LV MOD Diam; Recent value; End Systole (A4C): 1 19 cm  LV MOD Diam; Recent value; End Systole (A4C): 1 51 cm  LV MOD Diam; Recent value; End Systole (A4C): 2 8 cm  LV MOD Diam; Recent value; End Systole (A4C): 3 01 cm  LV MOD Diam; Recent value; End Systole (A4C): 3 06 cm  LVEF MOD A4C: 68 8 %  Left Ventricle diastolic major axis; Most recent value chosen; Method of Disks,  Single Plane; 2D mode; Apical four chamber;: 7 88 cm Left Ventricle systolic  major axis; Most recent value chosen; Method of Disks, Single Plane; 2D mode; Apical four chamber;: 5 52 cm Left Ventricular Diastolic Area; Most recent  value chosen; Method of Disks, Single Plane; 2D mode; Apical four chamber;:  2810 mm2 Left Ventricular End Diastolic Volume; Most recent value chosen;  Method of Disks, Single Plane; 2D mode; Apical four chamber;: 82 2 cm3 Left  Ventricular End Systolic Volume; Most recent value chosen; Method of Disks,  Single Plane; 2D mode; Apical four chamber;: 25 7 cm3 Left Ventricular Systolic  Area; Most recent value chosen; Method of Disks, Single Plane; 2D mode; Apical  four chamber;: 1300 mm2  SV MOD A4C: 56 6 cm3    Tissue Doppler Imaging  LV Peak Early Vo Tissue Zafar; Medial MA (TDI): 69 6 mm/s  Left Ventricular Peak Early Diastolic Tissue Velocity; Mean; Mean value chosen;  Medial Mitral Annulus; Tissue Doppler Imaging;: 69 6 mm/s    Unspecified Scan Mode  MV Peak Zafar/LV Peak Tissue Zafar E-Wave; Medial MA: 12 4  Dec Rutherford; Antegrade Flow: 5140 mm/s2  Dec Rutherford; Mean; Antegrade Flow: 5140 mm/s2  MV A Zafar: 762 mm/s  MV E Zafar: 863 mm/s  MV E/A Ratio: 1 1  MV Peak A Zafar: 762 mm/s  MV Peak E Zafar; Mean;  Antegrade Flow: 863 mm/s  Peak Grad; Mean; Regurgitant Flow: 21 mm[Hg]  Vmax; Mean; Regurgitant Flow: 2300 mm/s  Vmax; Regurgitant Flow: 2390 mm/s  Vmax; Regurgitant Flow: 2200 mm/s    [facility]    Prepared and electronically signed by    Jey Goodman MD  Signed [id number] 17:39:45       No results found for this or any previous visit  No results found for this or any previous visit  No results found for this or any previous visit        EKG personally reviewed by Milka Ballesteros DO  [facility]

## 2018-11-09 LAB
ALBUMIN SERPL-MCNC: 4.2 G/DL (ref 3.6–5.1)
ALBUMIN/GLOB SERPL: 1.4 (CALC) (ref 1–2.5)
ALP SERPL-CCNC: 109 U/L (ref 33–130)
ALT SERPL-CCNC: 15 U/L (ref 6–29)
AST SERPL-CCNC: 19 U/L (ref 10–35)
BASOPHILS # BLD AUTO: 58 CELLS/UL (ref 0–200)
BASOPHILS NFR BLD AUTO: 1 %
BILIRUB SERPL-MCNC: 0.7 MG/DL (ref 0.2–1.2)
BUN SERPL-MCNC: 15 MG/DL (ref 7–25)
BUN/CREAT SERPL: NORMAL (CALC) (ref 6–22)
CALCIUM SERPL-MCNC: 9.3 MG/DL (ref 8.6–10.4)
CHLORIDE SERPL-SCNC: 102 MMOL/L (ref 98–110)
CHOLEST SERPL-MCNC: 212 MG/DL
CHOLEST/HDLC SERPL: 2.9 (CALC)
CO2 SERPL-SCNC: 31 MMOL/L (ref 20–32)
CREAT SERPL-MCNC: 0.67 MG/DL (ref 0.6–0.93)
EOSINOPHIL # BLD AUTO: 180 CELLS/UL (ref 15–500)
EOSINOPHIL NFR BLD AUTO: 3.1 %
ERYTHROCYTE [DISTWIDTH] IN BLOOD BY AUTOMATED COUNT: 12.9 % (ref 11–15)
GLOBULIN SER CALC-MCNC: 2.9 G/DL (CALC) (ref 1.9–3.7)
GLUCOSE SERPL-MCNC: 89 MG/DL (ref 65–99)
HCT VFR BLD AUTO: 38.8 % (ref 35–45)
HDLC SERPL-MCNC: 72 MG/DL
HGB BLD-MCNC: 13.2 G/DL (ref 11.7–15.5)
LDLC SERPL CALC-MCNC: 121 MG/DL (CALC)
LYMPHOCYTES # BLD AUTO: 1978 CELLS/UL (ref 850–3900)
LYMPHOCYTES NFR BLD AUTO: 34.1 %
MCH RBC QN AUTO: 31.3 PG (ref 27–33)
MCHC RBC AUTO-ENTMCNC: 34 G/DL (ref 32–36)
MCV RBC AUTO: 91.9 FL (ref 80–100)
MONOCYTES # BLD AUTO: 580 CELLS/UL (ref 200–950)
MONOCYTES NFR BLD AUTO: 10 %
NEUTROPHILS # BLD AUTO: 3004 CELLS/UL (ref 1500–7800)
NEUTROPHILS NFR BLD AUTO: 51.8 %
NONHDLC SERPL-MCNC: 140 MG/DL (CALC)
PLATELET # BLD AUTO: 267 THOUSAND/UL (ref 140–400)
PMV BLD REES-ECKER: 11.8 FL (ref 7.5–12.5)
POTASSIUM SERPL-SCNC: 3.6 MMOL/L (ref 3.5–5.3)
PROT SERPL-MCNC: 7.1 G/DL (ref 6.1–8.1)
RBC # BLD AUTO: 4.22 MILLION/UL (ref 3.8–5.1)
SL AMB EGFR AFRICAN AMERICAN: 102 ML/MIN/1.73M2
SL AMB EGFR NON AFRICAN AMERICAN: 88 ML/MIN/1.73M2
SODIUM SERPL-SCNC: 138 MMOL/L (ref 135–146)
TRIGL SERPL-MCNC: 93 MG/DL
TSH SERPL-ACNC: 3.94 MIU/L (ref 0.4–4.5)
WBC # BLD AUTO: 5.8 THOUSAND/UL (ref 3.8–10.8)

## 2019-03-15 ENCOUNTER — TELEPHONE (OUTPATIENT)
Dept: FAMILY MEDICINE CLINIC | Facility: CLINIC | Age: 74
End: 2019-03-15

## 2019-03-15 DIAGNOSIS — I10 ESSENTIAL HYPERTENSION: ICD-10-CM

## 2019-03-15 NOTE — TELEPHONE ENCOUNTER
Patient is scheduled for 4/22 with Dr Shirley Taylor for her AWV but she will run out of her prescriptions about a week prior to her appointment  She will need a refill    Please advise or please fill to [facility] [phone]    valsartan-hydrochlorothiazide (DIOVAN-HCT) 320-25 MG per tablet, Dose: 1 tablet, Route: Oral, Frequency: Daily, Dispense Quantity: 90 tablet, Refills: 3, Sig: Take 1 tablet by mouth daily

## 2019-03-19 NOTE — TELEPHONE ENCOUNTER
Just to confirm, would she call her pharmacy to see if she could get a temporary refill for this prescription until she is seen for her AWV?     Thanks

## 2019-03-21 DIAGNOSIS — I10 ESSENTIAL HYPERTENSION: ICD-10-CM

## 2019-03-21 RX ORDER — VALSARTAN AND HYDROCHLOROTHIAZIDE 320; 25 MG/1; MG/1
1 TABLET, FILM COATED ORAL DAILY
Qty: 30 TABLET | Refills: 0 | Status: SHIPPED | OUTPATIENT
Start: 2019-03-21 | End: 2019-04-22 | Stop reason: SDUPTHER

## 2019-03-21 NOTE — TELEPHONE ENCOUNTER
I believe this medication was recalled, patient needs to call the pharmacy for substitutions options

## 2019-03-21 NOTE — TELEPHONE ENCOUNTER
Patient called her pharmacy and they said that the medication she is currently taking is not on the recall list     Please advise if she can receive another refill before her upcoming appointment as she will be out about a week before her next appointment with Dr Davion [place]      She would like it to be a 90 day supply sent to [facility] [phone]    valsartan-hydrochlorothiazide (DIOVAN-HCT) 320-25 MG per tablet, Dose: 1 tablet, Route: Oral, Frequency: Daily, Dispense Quantity: 90 tablet Refills: 3, Sig: Take 1 tablet by mouth daily

## 2019-04-17 ENCOUNTER — TRANSCRIBE ORDERS (OUTPATIENT)
Dept: ADMINISTRATIVE | Facility: HOSPITAL | Age: 74
End: 2019-04-17

## 2019-04-17 DIAGNOSIS — Z12.39 ENCOUNTER FOR SCREENING FOR MALIGNANT NEOPLASM OF BREAST: Primary | ICD-10-CM

## 2019-04-22 ENCOUNTER — OFFICE VISIT (OUTPATIENT)
Dept: FAMILY MEDICINE CLINIC | Facility: CLINIC | Age: 74
End: 2019-04-22
Payer: COMMERCIAL

## 2019-04-22 VITALS
HEIGHT: 62 IN | RESPIRATION RATE: 16 BRPM | BODY MASS INDEX: 29.88 KG/M2 | HEART RATE: 69 BPM | OXYGEN SATURATION: 99 % | TEMPERATURE: 98.4 F | SYSTOLIC BLOOD PRESSURE: 140 MMHG | DIASTOLIC BLOOD PRESSURE: 72 MMHG | WEIGHT: 162.4 LBS

## 2019-04-22 DIAGNOSIS — I10 ESSENTIAL HYPERTENSION: ICD-10-CM

## 2019-04-22 DIAGNOSIS — Z12.31 BREAST CANCER SCREENING BY MAMMOGRAM: ICD-10-CM

## 2019-04-22 DIAGNOSIS — Z13.820 SCREENING FOR OSTEOPOROSIS: ICD-10-CM

## 2019-04-22 DIAGNOSIS — Z00.00 MEDICARE ANNUAL WELLNESS VISIT, SUBSEQUENT: Primary | ICD-10-CM

## 2019-04-22 DIAGNOSIS — Z11.59 ENCOUNTER FOR HEPATITIS C SCREENING TEST FOR LOW RISK PATIENT: ICD-10-CM

## 2019-04-22 DIAGNOSIS — E03.8 SUBCLINICAL HYPOTHYROIDISM: ICD-10-CM

## 2019-04-22 PROCEDURE — 3725F SCREEN DEPRESSION PERFORMED: CPT | Performed by: FAMILY MEDICINE

## 2019-04-22 PROCEDURE — 1170F FXNL STATUS ASSESSED: CPT | Performed by: FAMILY MEDICINE

## 2019-04-22 PROCEDURE — 1125F AMNT PAIN NOTED PAIN PRSNT: CPT | Performed by: FAMILY MEDICINE

## 2019-04-22 PROCEDURE — G0439 PPPS, SUBSEQ VISIT: HCPCS | Performed by: FAMILY MEDICINE

## 2019-04-22 PROCEDURE — 3008F BODY MASS INDEX DOCD: CPT | Performed by: FAMILY MEDICINE

## 2019-04-22 PROCEDURE — 1160F RVW MEDS BY RX/DR IN RCRD: CPT | Performed by: FAMILY MEDICINE

## 2019-04-22 RX ORDER — VALSARTAN AND HYDROCHLOROTHIAZIDE 320; 25 MG/1; MG/1
1 TABLET, FILM COATED ORAL DAILY
Qty: 90 TABLET | Refills: 4 | Status: SHIPPED | OUTPATIENT
Start: 2019-04-22 | End: 2020-04-27 | Stop reason: SDUPTHER

## 2019-05-09 ENCOUNTER — HOSPITAL ENCOUNTER (OUTPATIENT)
Dept: RADIOLOGY | Facility: MEDICAL CENTER | Age: 74
Discharge: HOME | End: 2019-05-09
Payer: COMMERCIAL

## 2019-05-09 VITALS — BODY MASS INDEX: 30.58 KG/M2 | WEIGHT: 162 LBS | HEIGHT: 61 IN

## 2019-05-09 DIAGNOSIS — Z12.39 ENCOUNTER FOR SCREENING FOR MALIGNANT NEOPLASM OF BREAST: ICD-10-CM

## 2019-05-09 PROCEDURE — 77067 SCR MAMMO BI INCL CAD: CPT

## 2019-08-13 LAB
ALBUMIN SERPL-MCNC: 4 G/DL (ref 3.6–5.1)
ALBUMIN/GLOB SERPL: 1.5 (CALC) (ref 1–2.5)
ALP SERPL-CCNC: 102 U/L (ref 33–130)
ALT SERPL-CCNC: 16 U/L (ref 6–29)
AST SERPL-CCNC: 17 U/L (ref 10–35)
BILIRUB SERPL-MCNC: 0.5 MG/DL (ref 0.2–1.2)
BUN SERPL-MCNC: 15 MG/DL (ref 7–25)
BUN/CREAT SERPL: NORMAL (CALC) (ref 6–22)
CALCIUM SERPL-MCNC: 9.4 MG/DL (ref 8.6–10.4)
CHLORIDE SERPL-SCNC: 102 MMOL/L (ref 98–110)
CHOLEST SERPL-MCNC: 188 MG/DL
CHOLEST/HDLC SERPL: 3.4 (CALC)
CO2 SERPL-SCNC: 29 MMOL/L (ref 20–32)
CREAT SERPL-MCNC: 0.82 MG/DL (ref 0.6–0.93)
ERYTHROCYTE [DISTWIDTH] IN BLOOD BY AUTOMATED COUNT: 13 % (ref 11–15)
GLOBULIN SER CALC-MCNC: 2.7 G/DL (CALC) (ref 1.9–3.7)
GLUCOSE SERPL-MCNC: 85 MG/DL (ref 65–99)
HCT VFR BLD AUTO: 36.5 % (ref 35–45)
HCV AB S/CO SERPL IA: 0.01
HCV AB SERPL QL IA: NORMAL
HDLC SERPL-MCNC: 56 MG/DL
HGB BLD-MCNC: 12.3 G/DL (ref 11.7–15.5)
LDLC SERPL CALC-MCNC: 110 MG/DL (CALC)
MCH RBC QN AUTO: 31.5 PG (ref 27–33)
MCHC RBC AUTO-ENTMCNC: 33.7 G/DL (ref 32–36)
MCV RBC AUTO: 93.4 FL (ref 80–100)
NONHDLC SERPL-MCNC: 132 MG/DL (CALC)
PLATELET # BLD AUTO: 240 THOUSAND/UL (ref 140–400)
PMV BLD REES-ECKER: 11.8 FL (ref 7.5–12.5)
POTASSIUM SERPL-SCNC: 4 MMOL/L (ref 3.5–5.3)
PROT SERPL-MCNC: 6.7 G/DL (ref 6.1–8.1)
RBC # BLD AUTO: 3.91 MILLION/UL (ref 3.8–5.1)
SL AMB EGFR AFRICAN AMERICAN: 82 ML/MIN/1.73M2
SL AMB EGFR NON AFRICAN AMERICAN: 71 ML/MIN/1.73M2
SODIUM SERPL-SCNC: 138 MMOL/L (ref 135–146)
TRIGL SERPL-MCNC: 116 MG/DL
TSH SERPL-ACNC: 3.75 MIU/L (ref 0.4–4.5)
WBC # BLD AUTO: 4.8 THOUSAND/UL (ref 3.8–10.8)

## 2019-08-23 ENCOUNTER — OFFICE VISIT (OUTPATIENT)
Dept: CARDIOLOGY CLINIC | Facility: CLINIC | Age: 74
End: 2019-08-23
Payer: COMMERCIAL

## 2019-08-23 VITALS
RESPIRATION RATE: 16 BRPM | HEIGHT: 61 IN | DIASTOLIC BLOOD PRESSURE: 60 MMHG | HEART RATE: 70 BPM | BODY MASS INDEX: 29.74 KG/M2 | SYSTOLIC BLOOD PRESSURE: 132 MMHG | WEIGHT: 157.5 LBS

## 2019-08-23 DIAGNOSIS — I10 ESSENTIAL HYPERTENSION: ICD-10-CM

## 2019-08-23 DIAGNOSIS — R00.2 PALPITATIONS: Primary | ICD-10-CM

## 2019-08-23 DIAGNOSIS — R42 EPISODIC LIGHTHEADEDNESS: ICD-10-CM

## 2019-08-23 PROCEDURE — 93000 ELECTROCARDIOGRAM COMPLETE: CPT | Performed by: INTERNAL MEDICINE

## 2019-08-23 PROCEDURE — 3075F SYST BP GE 130 - 139MM HG: CPT | Performed by: INTERNAL MEDICINE

## 2019-08-23 PROCEDURE — 99213 OFFICE O/P EST LOW 20 MIN: CPT | Performed by: INTERNAL MEDICINE

## 2019-08-23 NOTE — PROGRESS NOTES
EPS Progress Note - Mary Juan 69 y o  female MRN: [id number]           ASSESSMENT:  1  Palpitations  POCT ECG            PLAN:  1  History intermittent palpitations secondary to PACs     2  Hypertension  Well controlled and stable on current meds    3  History of presyncopal episodes she has had three episodes were very briefly she feels tunnel vision and then it goes away she believes it may be related to head turning which could suggest a degree of carotid sinus hypersensitivity however she has never had a syncopal episode and therefore at this point time there is no indication for pacemaker  Her symptoms have been unchanged over the past few years  They last seconds at a time  If she should experiencing worsening of her symptoms more frequency or more severe she is going to call the office and I am going to place a loop recorder  As her symptoms have been completely stable and there have been no close calls in terms of passing out or feeling like she is going to pass out we will continue with yearly follow-up    Follow up in      HPI:   Mary Juan is a 69 y o  female who presents to the office for a 1 year follow-up  Patient has a history of palpitations, hypertension, and hypothyroidism  She has had approximately 10 episodes this year where she has felt fluttering that last seconds and within the last year she has had approximately three episodes where she felt closed in feeling she states it is often related to looking  She has had no presyncope and no syncope  She has not had any symptoms since March therefore in the last 5-6 months she has had no symptoms  She has had similar symptoms since 2016  ROS:   Negative for palpitations, shortness of breath, chest discomfort, presyncope or syncope, lower extremity swelling  All other 12 point ROS negative       Objective:     Vitals: Blood pressure 132/60, pulse 70, resp   rate 16, height 5' 1" (1 549 m), weight 71 4 kg (157 lb 8 oz), not currently breastfeeding , Body mass index is 29 76 kg/m²  ,        Physical Exam:    GEN: Khris Jones appears well, alert and oriented x 3, pleasant and cooperative   HEENT: pupils equal, round, and reactive to light; extraocular muscles intact  NECK: supple, no carotid bruits   HEART: regular rhythm, normal S1 and S2, no murmurs, clicks, gallops or rubs   LUNGS: clear to auscultation bilaterally; no wheezes, rales, or rhonchi   ABDOMEN: normal bowel sounds, soft, no tenderness, no distention  EXTREMITIES: peripheral pulses normal; no clubbing, cyanosis, or edema  NEURO: no focal findings   SKIN: normal without suspicious lesions on exposed skin    Medications:      Current Outpatient Medications:     aspirin (ASPIRIN LOW DOSE) 81 MG tablet, Take 1 tablet by mouth daily, Disp: , Rfl:     Cholecalciferol (VITAMIN D3) 2000 units capsule, Take 1 tablet by mouth daily, Disp: , Rfl:     Magnesium 250 MG TABS, Take 250 tablets by mouth daily as needed, Disp: , Rfl:     metroNIDAZOLE (METROGEL) 1 % gel, Apply topically, Disp: , Rfl:     Multiple Vitamin-Folic Acid TABS, Take 1 tablet by mouth daily, Disp: , Rfl:     Psyllium (METAMUCIL) 28 3 % POWD, Take by mouth, Disp: , Rfl:     valsartan-hydrochlorothiazide (DIOVAN-HCT) 320-25 MG per tablet, Take 1 tablet by mouth daily for 30 days (Patient taking differently: Take 0 5 tablets by mouth 2 (two) times a day ), Disp: 90 tablet, Rfl: 4     Family History   Problem Relation Age of Onset    Hypertension Mother     Coronary artery disease Father     Diverticulitis Father         Colon    Stroke Father         Syndrome    Venous thrombosis Father         of the deep Vessels of the distal lower extremity    Diabetes Brother     Lung cancer Brother 48    Venous thrombosis Brother         of the deep vessels of the distal Lower extremity    Melanoma Brother     Diabetes Paternal Grandfather     Lung cancer Family         Adenocarcinoma of the Lung    Other Family         Disorders of Blood and Blood-forming Organs, Skin Disorder    Heart disease Family     Colon cancer Paternal Aunt     No Known Problems Sister     No Known Problems Daughter     No Known Problems Maternal Grandmother     No Known Problems Maternal Grandfather     No Known Problems Paternal Grandmother      Social History     Socioeconomic History    Marital status: /Civil Union     Spouse name: Not on file    Number of children: Not on file    Years of education: Not on file    Highest education level: Not on file   Occupational History    Not on file   Social Needs    Financial resource strain: Not on file    Food insecurity:     Worry: Not on file     Inability: Not on file    Transportation needs:     Medical: Not on file     Non-medical: Not on file   Tobacco Use    Smoking status: Never Smoker    Smokeless tobacco: Never Used   Substance and Sexual Activity    Alcohol use: Yes     Comment: social drinker    Drug use: No    Sexual activity: Not on file   Lifestyle    Physical activity:     Days per week: Not on file     Minutes per session: Not on file    Stress: Not on file   Relationships    Social connections:     Talks on phone: Not on file     Gets together: Not on file     Attends Denominational service: Not on file     Active member of club or organization: Not on file     Attends meetings of clubs or organizations: Not on file     Relationship status: Not on file    Intimate partner violence:     Fear of current or ex partner: Not on file     Emotionally abused: Not on file     Physically abused: Not on file     Forced sexual activity: Not on file   Other Topics Concern    Not on file   Social History Narrative    Caffeine use    Exercise frequency      Social History     Tobacco Use   Smoking Status Never Smoker   Smokeless Tobacco Never Used     Social History     Substance and Sexual Activity   Alcohol Use Yes    Comment: social drinker       Labs & Results:  Below is the patient's most recent value for Albumin, ALT, AST, BUN, Calcium, Chloride, Cholesterol, CO2, Creatinine, GFR, Glucose, HDL, Hematocrit, Hemoglobin, Hemoglobin A1C, LDL, Magnesium, Phosphorus, Platelets, Potassium, PSA, Sodium, Triglycerides, and WBC  Lab Results   Component Value Date    ALT 16 2019    AST 17 2019    BUN 15 2019    CALCIUM 9 4 2019     2019    CHOL 195 2017    CO2 29 2019    CREATININE 0 82 2019    HDL 56 2019    HCT 36 5 2019    HGB 12 3 2019    MG 2 1 2016     2019    K 4 0 2019     2017    TRIG 116 2019    WBC 4 8 2019     Note: for a comprehensive list of the patient's lab results, access the Results Review activity  Cardiac testing:   Results for orders placed during the hospital encounter of 16   Echo complete with contrast if indicated    N[facility]ative [facility]  [address]  [phone]    Transthoracic Echocardiogram  2D, M-mode, Doppler, and Color Doppler    Study date:  2016    Patient: Abhinav Beard  MR number: [id number]  Account number: [de-identified]  : 1945  Age: 72 years  Gender: Female  Status: Outpatient  Location: Bedside  Height: 61 in  Weight: 159 lb  BP: 140/ 62 mmHg    Indications: Palpitations  Diagnoses: R00 2 - Palpitations    Sonographer:  ADORE Monae  Primary Physician:  Eneida Matos DO  Group:  [facility]  Interpreting Physician:  Esther Stephens MD    SUMMARY    LEFT VENTRICLE:  Systolic function was normal by visual assessment  Ejection fraction was  estimated to be 70 %  There were no regional wall motion abnormalities  RIGHT VENTRICLE:  The size was normal   Systolic function was normal     MITRAL VALVE:  There was mild regurgitation      TRICUSPID VALVE:  There was mild to moderate regurgitation  PULMONIC VALVE:  There was trace regurgitation  IVC, HEPATIC VEINS:  Respirophasic changes were blunted (less than 50% variation)  HISTORY: PRIOR HISTORY: Risk factors: hypertension  PROCEDURE: The procedure was performed at the bedside  This was a routine  study  The transthoracic approach was used  The study included complete 2D  imaging, M-mode, complete spectral Doppler, and color Doppler  The heart rate  was 60 bpm, at the start of the study  Images were obtained from the  parasternal, apical, subcostal, and suprasternal notch acoustic windows  Echocardiographic views were limited due to poor acoustic window availability,  decreased penetration, and lung interference  This was a technically difficult  study  LEFT VENTRICLE: Size was normal  Systolic function was normal by visual  assessment  Ejection fraction was estimated to be 70 %  There were no regional  wall motion abnormalities  Wall thickness was normal  DOPPLER: Left ventricular  diastolic function parameters were normal     RIGHT VENTRICLE: The size was normal  Systolic function was normal  Wall  thickness was normal     LEFT ATRIUM: Size was normal     RIGHT ATRIUM: Size was normal     MITRAL VALVE: Valve structure was normal  There was normal leaflet separation  DOPPLER: The transmitral velocity was within the normal range  There was no  evidence for stenosis  There was mild regurgitation  AORTIC VALVE: The valve was trileaflet  Leaflets exhibited normal thickness and  normal cuspal separation  DOPPLER: Transaortic velocity was within the normal  range  There was no evidence for stenosis  There was no significant  regurgitation  TRICUSPID VALVE: The valve structure was normal  There was normal leaflet  separation  DOPPLER: The transtricuspid velocity was within the normal range  There was no evidence for stenosis  There was mild to moderate regurgitation  Estimated peak PA pressure was 25 mmHg      PULMONIC VALVE: Not well visualized  DOPPLER: The transpulmonic velocity was  within the normal range  There was trace regurgitation  PERICARDIUM: There was no pericardial effusion  The pericardium was normal in  appearance  AORTA: The root exhibited normal size  SYSTEMIC VEINS: IVC: The inferior vena cava was normal in size  Respirophasic  changes were blunted (less than 50% variation)  MEASUREMENT TABLES    OTHER ECHO MEASUREMENTS  (Reference normals)  Estimated CVP   8 mmHg   (--)    SYSTEM MEASUREMENT TABLES    2D mode  AV Diam: 3 5 cm  AoR Diam; Mean (2D): 3 5 cm  LA Diam (2D): 3 4 cm  LA Dimension; Mean (2D): 3 4 cm  LA/Ao (2D): 0 97  EDV (2D-Cubed): 41 8 cm3  EF (2D-Cubed): 77 5 %  ESV (2D-Cubed): 9 39 cm3  FS (2D-Cubed): 39 2 %  FS (2D-Teich): 39 2 %  IVS/LVPW (2D): 1 09  IVSd (2D): 0 9 cm  IVSd; Mean chosen (2D): 0 9 cm  LVIDd (2D): 3 47 cm  LVIDd; Mean (2D): 3 47 cm  LVIDs (2D): 2 11 cm  LVIDs; Mean (2D): 2 11 cm  LVPWd (2D): 0 83 cm  LVPWd; Mean (2D): 0 83 cm  Left Ventricular Ejection Fraction; Teichholz; 2D mode;: 70 7 %  Left Ventricular End Diastolic Volume; Teichholz; 2D mode;: 49 8 cm3  Left Ventricular End Systolic Volume; Teichholz; 2D mode;: 14 6 cm3  SV (2D-Cubed): 32 4 cm3  Stroke Volume; Teichholz; 2D mode;: 35 2 cm3  RVIDd (2D): 3 59 cm  RVIDd; Mean (2D): 3 59 cm    Apical four chamber  LV MOD Diam; Recent value; End Diastole (A4C): 1 85 cm  LV MOD Diam; Recent value; End Diastole (A4C): 4 14 cm  LV MOD Diam; Recent value; End Diastole (A4C): 3 84 cm  LV MOD Diam; Recent value; End Diastole (A4C): 4 24 cm  LV MOD Diam; Recent value; End Diastole (A4C): 4 26 cm  LV MOD Diam; Recent value; End Diastole (A4C): 4 21 cm  LV MOD Diam; Recent value; End Diastole (A4C): 4 03 cm  LV MOD Diam; Recent value; End Diastole (A4C): 1 5 cm  LV MOD Diam; Recent value; End Diastole (A4C): 2 24 cm  LV MOD Diam; Recent value; End Diastole (A4C): 2 71 cm  LV MOD Diam; Recent value;  End Diastole (A4C): 3 03 cm  LV MOD Diam; Recent value; End Diastole (A4C): 3 27 cm  LV MOD Diam; Recent value; End Diastole (A4C): 3 52 cm  LV MOD Diam; Recent value; End Diastole (A4C): 3 74 cm  LV MOD Diam; Recent value; End Diastole (A4C): 3 89 cm  LV MOD Diam; Recent value; End Diastole (A4C): 3 96 cm  LV MOD Diam; Recent value; End Diastole (A4C): 3 99 cm  LV MOD Diam; Recent value; End Diastole (A4C): 4 11 cm  LV MOD Diam; Recent value; End Diastole (A4C): 4 24 cm  LV MOD Diam; Recent value; End Diastole (A4C): 4 26 cm  LV MOD Diam; Recent value; End Systole (A4C): 1 65 cm  LV MOD Diam; Recent value; End Systole (A4C): 3 06 cm  LV MOD Diam; Recent value; End Systole (A4C): 3 06 cm  LV MOD Diam; Recent value; End Systole (A4C): 2 92 cm  LV MOD Diam; Recent value; End Systole (A4C): 2 72 cm  LV MOD Diam; Recent value; End Systole (A4C): 1 75 cm  LV MOD Diam; Recent value; End Systole (A4C): 2 02 cm  LV MOD Diam; Recent value; End Systole (A4C): 2 21 cm  LV MOD Diam; Recent value; End Systole (A4C): 2 36 cm  LV MOD Diam; Recent value; End Systole (A4C): 2 48 cm  LV MOD Diam; Recent value; End Systole (A4C): 2 55 cm  LV MOD Diam; Recent value; End Systole (A4C): 2 55 cm  LV MOD Diam; Recent value; End Systole (A4C): 2 6 cm  LV MOD Diam; Recent value; End Systole (A4C): 2 65 cm  LV MOD Diam; Recent value; End Systole (A4C): 0 83 cm  LV MOD Diam; Recent value; End Systole (A4C): 1 19 cm  LV MOD Diam; Recent value; End Systole (A4C): 1 51 cm  LV MOD Diam; Recent value; End Systole (A4C): 2 8 cm  LV MOD Diam; Recent value; End Systole (A4C): 3 01 cm  LV MOD Diam; Recent value; End Systole (A4C): 3 06 cm  LVEF MOD A4C: 68 8 %  Left Ventricle diastolic major axis; Most recent value chosen; Method of Disks,  Single Plane; 2D mode; Apical four chamber;: 7 88 cm Left Ventricle systolic  major axis; Most recent value chosen; Method of Disks, Single Plane; 2D mode; Apical four chamber;: 5 52 cm Left Ventricular Diastolic Area;  Most recent  value chosen; Method of Disks, Single Plane; 2D mode; Apical four chamber;:  2810 mm2 Left Ventricular End Diastolic Volume; Most recent value chosen;  Method of Disks, Single Plane; 2D mode; Apical four chamber;: 82 2 cm3 Left  Ventricular End Systolic Volume; Most recent value chosen; Method of Disks,  Single Plane; 2D mode; Apical four chamber;: 25 7 cm3 Left Ventricular Systolic  Area; Most recent value chosen; Method of Disks, Single Plane; 2D mode; Apical  four chamber;: 1300 mm2  SV MOD A4C: 56 6 cm3    Tissue Doppler Imaging  LV Peak Early Vo Tissue Zafar; Medial MA (TDI): 69 6 mm/s  Left Ventricular Peak Early Diastolic Tissue Velocity; Mean; Mean value chosen;  Medial Mitral Annulus; Tissue Doppler Imaging;: 69 6 mm/s    Unspecified Scan Mode  MV Peak Zafar/LV Peak Tissue Zafar E-Wave; Medial MA: 12 4  [place]; Antegrade Flow: 5140 mm/s2  [place]; Mean; Antegrade Flow: 5140 mm/s2  MV A Zafar: 762 mm/s  MV E Zafar: 863 mm/s  MV E/A Ratio: 1 1  MV Peak A Zafar: 762 mm/s  MV Peak E Zafar; Mean; Antegrade Flow: 863 mm/s  Peak Grad; Mean; Regurgitant Flow: 21 mm[Hg]  Vmax; Mean; Regurgitant Flow: 2300 mm/s  Vmax; Regurgitant Flow: 2390 mm/s  Vmax; Regurgitant Flow: 2200 mm/s    [facility] Accredited Echocardiography Laboratory    Prepared and electronically signed by    Jey Goodman MD  Signed [id number] 17:39:45       No results found for this or any previous visit  No results found for this or any previous visit  No results found for this or any previous visit        Scribe Attestation    I,:   Oliva Myers am acting as a scribe while in the presence of the attending physician :        I,:   Milka Ballesteros, DO personally performed the services described in this documentation    as scribed in my presence :

## 2019-08-23 NOTE — LETTER
August 23, 2019     Referral [address] [place] [id number]    Patient: Kasey Gilmore   YOB: 1945   Date of Visit: 8/23/2019       Dear Dr Jesus Arredondo:    Thank you for referring Mitcheal Goodpasture to me for evaluation  Below are my notes for this consultation  If you have questions, please do not hesitate to call me  I look forward to following your patient along with you  Sincerely,        June Harvard, DO        CC: Mary Ovalles PA-C  June Harvard,   8/23/2019  3:28 PM  Sign at close encounter  EPS Progress Note - Kasey Gilmore 69 y o  female MRN: [id number]           ASSESSMENT:  1  Palpitations  POCT ECG            PLAN:  1  History intermittent palpitations secondary to PACs     2  Hypertension  Well controlled and stable on current meds    3  History of presyncopal episodes she has had three episodes were very briefly she feels tunnel vision and then it goes away she believes it may be related to head turning which could suggest a degree of carotid sinus hypersensitivity however she has never had a syncopal episode and therefore at this point time there is no indication for pacemaker  Her symptoms have been unchanged over the past few years  They last seconds at a time  If she should experiencing worsening of her symptoms more frequency or more severe she is going to call the office and I am going to place a loop recorder  As her symptoms have been completely stable and there have been no close calls in terms of passing out or feeling like she is going to pass out we will continue with yearly follow-up    Follow up in      HPI:   Kasey Gilmore is a 69 y o  female who presents to the office for a 1 year follow-up  Patient has a history of palpitations, hypertension, and hypothyroidism    She has had approximately 10 episodes this year where she has felt fluttering that last seconds and within the last year she has had approximately three episodes where she felt closed in feeling she states it is often related to looking  She has had no presyncope and no syncope  She has not had any symptoms since March therefore in the last 5-6 months she has had no symptoms  She has had similar symptoms since 2016  ROS:   Negative for palpitations, shortness of breath, chest discomfort, presyncope or syncope, lower extremity swelling  All other 12 point ROS negative       Objective:     Vitals: Blood pressure 132/60, pulse 70, resp  rate 16, height 5' 1" (1 549 m), weight 71 4 kg (157 lb 8 oz), not currently breastfeeding , Body mass index is 29 76 kg/m²  ,        Physical Exam:    GEN: Eva Aleman appears well, alert and oriented x 3, pleasant and cooperative   HEENT: pupils equal, round, and reactive to light; extraocular muscles intact  NECK: supple, no carotid bruits   HEART: regular rhythm, normal S1 and S2, no murmurs, clicks, gallops or rubs   LUNGS: clear to auscultation bilaterally; no wheezes, rales, or rhonchi   ABDOMEN: normal bowel sounds, soft, no tenderness, no distention  EXTREMITIES: peripheral pulses normal; no clubbing, cyanosis, or edema  NEURO: no focal findings   SKIN: normal without suspicious lesions on exposed skin    Medications:      Current Outpatient Medications:     aspirin (ASPIRIN LOW DOSE) 81 MG tablet, Take 1 tablet by mouth daily, Disp: , Rfl:     Cholecalciferol (VITAMIN D3) 2000 units capsule, Take 1 tablet by mouth daily, Disp: , Rfl:     Magnesium 250 MG TABS, Take 250 tablets by mouth daily as needed, Disp: , Rfl:     metroNIDAZOLE (METROGEL) 1 % gel, Apply topically, Disp: , Rfl:     Multiple Vitamin-Folic Acid TABS, Take 1 tablet by mouth daily, Disp: , Rfl:     Psyllium (METAMUCIL) 28 3 % POWD, Take by mouth, Disp: , Rfl:     valsartan-hydrochlorothiazide (DIOVAN-HCT) 320-25 MG per tablet, Take 1 tablet by mouth daily for 30 days (Patient taking differently: Take 0 5 tablets by mouth 2 (two) times a day ), Disp: 90 tablet, Rfl: 4     Family History   Problem Relation Age of Onset    Hypertension Mother     Coronary artery disease Father     Diverticulitis Father         Colon    Stroke Father         Syndrome    Venous thrombosis Father         of the deep Vessels of the distal lower extremity    Diabetes Brother     Lung cancer Brother 48    Venous thrombosis Brother         of the deep vessels of the distal Lower extremity    Melanoma Brother     Diabetes Paternal Grandfather     Lung cancer Family         Adenocarcinoma of the Lung    Other Family         Disorders of Blood and Blood-forming Organs, Skin Disorder    Heart disease Family     Colon cancer Paternal Aunt     No Known Problems Sister     No Known Problems Daughter     No Known Problems Maternal Grandmother     No Known Problems Maternal Grandfather     No Known Problems Paternal Grandmother      Social History     Socioeconomic History    Marital status: /Civil Union     Spouse name: Not on file    Number of children: Not on file    Years of education: Not on file    Highest education level: Not on file   Occupational History    Not on file   Social Needs    Financial resource strain: Not on file    Food insecurity:     Worry: Not on file     Inability: Not on file    Transportation needs:     Medical: Not on file     Non-medical: Not on file   Tobacco Use    Smoking status: Never Smoker    Smokeless tobacco: Never Used   Substance and Sexual Activity    Alcohol use: Yes     Comment: social drinker    Drug use: No    Sexual activity: Not on file   Lifestyle    Physical activity:     Days per week: Not on file     Minutes per session: Not on file    Stress: Not on file   Relationships    Social connections:     Talks on phone: Not on file     Gets together: Not on file     Attends Church service: Not on file     Active member of club or organization: Not on file     Attends meetings of clubs or organizations: Not on file     Relationship status: Not on file    Intimate partner violence:     Fear of current or ex partner: Not on file     Emotionally abused: Not on file     Physically abused: Not on file     Forced sexual activity: Not on file   Other Topics Concern    Not on file   Social History Narrative    Caffeine use    Exercise frequency      Social History     Tobacco Use   Smoking Status Never Smoker   Smokeless Tobacco Never Used     Social History     Substance and Sexual Activity   Alcohol Use Yes    Comment: social drinker       Labs & Results:  Below is the patient's most recent value for Albumin, ALT, AST, BUN, Calcium, Chloride, Cholesterol, CO2, Creatinine, GFR, Glucose, HDL, Hematocrit, Hemoglobin, Hemoglobin A1C, LDL, Magnesium, Phosphorus, Platelets, Potassium, PSA, Sodium, Triglycerides, and WBC  Lab Results   Component Value Date    ALT 16 2019    AST 17 2019    BUN 15 2019    CALCIUM 9 4 2019     2019    CHOL 195 2017    CO2 29 2019    CREATININE 0 82 2019    HDL 56 2019    HCT 36 5 2019    HGB 12 3 2019    MG 2 1 2016     2019    K 4 0 2019     2017    TRIG 116 2019    WBC 4 8 2019     Note: for a comprehensive list of the patient's lab results, access the Results Review activity  Cardiac testing:   Results for orders placed during the hospital encounter of 16   Echo complete with contrast if indicated    Narrative [facility] [address]  [phone]    Transthoracic Echocardiogram  2D, M-mode, Doppler, and Color Doppler    Study date:  2016    Patient: Logan Schofield  MR number: [id number]  Account number: [de-identified]  : 1945  Age: 72 years  Gender: Female  Status: Outpatient  Location: Bedside  Height: 61 in  Weight: 159 lb  BP: 140/ 62 mmHg    Indications: Palpitations      Diagnoses: R00 2 - Palpitations    Sonographer:  ADORE Otto  Primary Physician:  Natacha Beal DO  Group:  [facility]  Interpreting Physician:  Gino Orozco MD    SUMMARY    LEFT VENTRICLE:  Systolic function was normal by visual assessment  Ejection fraction was  estimated to be 70 %  There were no regional wall motion abnormalities  RIGHT VENTRICLE:  The size was normal   Systolic function was normal     MITRAL VALVE:  There was mild regurgitation  TRICUSPID VALVE:  There was mild to moderate regurgitation  PULMONIC VALVE:  There was trace regurgitation  IVC, HEPATIC VEINS:  Respirophasic changes were blunted (less than 50% variation)  HISTORY: PRIOR HISTORY: Risk factors: hypertension  PROCEDURE: The procedure was performed at the bedside  This was a routine  study  The transthoracic approach was used  The study included complete 2D  imaging, M-mode, complete spectral Doppler, and color Doppler  The heart rate  was 60 bpm, at the start of the study  Images were obtained from the  parasternal, apical, subcostal, and suprasternal notch acoustic windows  Echocardiographic views were limited due to poor acoustic window availability,  decreased penetration, and lung interference  This was a technically difficult  study  LEFT VENTRICLE: Size was normal  Systolic function was normal by visual  assessment  Ejection fraction was estimated to be 70 %  There were no regional  wall motion abnormalities  Wall thickness was normal  DOPPLER: Left ventricular  diastolic function parameters were normal     RIGHT VENTRICLE: The size was normal  Systolic function was normal  Wall  thickness was normal     LEFT ATRIUM: Size was normal     RIGHT ATRIUM: Size was normal     MITRAL VALVE: Valve structure was normal  There was normal leaflet separation  DOPPLER: The transmitral velocity was within the normal range  There was no  evidence for stenosis  There was mild regurgitation      AORTIC VALVE: The valve was trileaflet  Leaflets exhibited normal thickness and  normal cuspal separation  DOPPLER: Transaortic velocity was within the normal  range  There was no evidence for stenosis  There was no significant  regurgitation  TRICUSPID VALVE: The valve structure was normal  There was normal leaflet  separation  DOPPLER: The transtricuspid velocity was within the normal range  There was no evidence for stenosis  There was mild to moderate regurgitation  Estimated peak PA pressure was 25 mmHg  PULMONIC VALVE: Not well visualized  DOPPLER: The transpulmonic velocity was  within the normal range  There was trace regurgitation  PERICARDIUM: There was no pericardial effusion  The pericardium was normal in  appearance  AORTA: The root exhibited normal size  SYSTEMIC VEINS: IVC: The inferior vena cava was normal in size  Respirophasic  changes were blunted (less than 50% variation)  MEASUREMENT TABLES    OTHER ECHO MEASUREMENTS  (Reference normals)  Estimated CVP   8 mmHg   (--)    SYSTEM MEASUREMENT TABLES    2D mode  AV Diam: 3 5 cm  AoR Diam; Mean (2D): 3 5 cm  LA Diam (2D): 3 4 cm  LA Dimension; Mean (2D): 3 4 cm  LA/Ao (2D): 0 97  EDV (2D-Cubed): 41 8 cm3  EF (2D-Cubed): 77 5 %  ESV (2D-Cubed): 9 39 cm3  FS (2D-Cubed): 39 2 %  FS (2D-Teich): 39 2 %  IVS/LVPW (2D): 1 09  IVSd (2D): 0 9 cm  IVSd; Mean chosen (2D): 0 9 cm  LVIDd (2D): 3 47 cm  LVIDd; Mean (2D): 3 47 cm  LVIDs (2D): 2 11 cm  LVIDs; Mean (2D): 2 11 cm  LVPWd (2D): 0 83 cm  LVPWd; Mean (2D): 0 83 cm  Left Ventricular Ejection Fraction; Teichholz; 2D mode;: 70 7 %  Left Ventricular End Diastolic Volume; Teichholz; 2D mode;: 49 8 cm3  Left Ventricular End Systolic Volume; Teichholz; 2D mode;: 14 6 cm3  SV (2D-Cubed): 32 4 cm3  Stroke Volume; Teichholz; 2D mode;: 35 2 cm3  RVIDd (2D): 3 59 cm  RVIDd; Mean (2D): 3 59 cm    Apical four chamber  LV MOD Diam; Recent value; End Diastole (A4C): 1 85 cm  LV MOD Diam; Recent value;  End Diastole (A4C): 4 14 cm  LV MOD Diam; Recent value; End Diastole (A4C): 3 84 cm  LV MOD Diam; Recent value; End Diastole (A4C): 4 24 cm  LV MOD Diam; Recent value; End Diastole (A4C): 4 26 cm  LV MOD Diam; Recent value; End Diastole (A4C): 4 21 cm  LV MOD Diam; Recent value; End Diastole (A4C): 4 03 cm  LV MOD Diam; Recent value; End Diastole (A4C): 1 5 cm  LV MOD Diam; Recent value; End Diastole (A4C): 2 24 cm  LV MOD Diam; Recent value; End Diastole (A4C): 2 71 cm  LV MOD Diam; Recent value; End Diastole (A4C): 3 03 cm  LV MOD Diam; Recent value; End Diastole (A4C): 3 27 cm  LV MOD Diam; Recent value; End Diastole (A4C): 3 52 cm  LV MOD Diam; Recent value; End Diastole (A4C): 3 74 cm  LV MOD Diam; Recent value; End Diastole (A4C): 3 89 cm  LV MOD Diam; Recent value; End Diastole (A4C): 3 96 cm  LV MOD Diam; Recent value; End Diastole (A4C): 3 99 cm  LV MOD Diam; Recent value; End Diastole (A4C): 4 11 cm  LV MOD Diam; Recent value; End Diastole (A4C): 4 24 cm  LV MOD Diam; Recent value; End Diastole (A4C): 4 26 cm  LV MOD Diam; Recent value; End Systole (A4C): 1 65 cm  LV MOD Diam; Recent value; End Systole (A4C): 3 06 cm  LV MOD Diam; Recent value; End Systole (A4C): 3 06 cm  LV MOD Diam; Recent value; End Systole (A4C): 2 92 cm  LV MOD Diam; Recent value; End Systole (A4C): 2 72 cm  LV MOD Diam; Recent value; End Systole (A4C): 1 75 cm  LV MOD Diam; Recent value; End Systole (A4C): 2 02 cm  LV MOD Diam; Recent value; End Systole (A4C): 2 21 cm  LV MOD Diam; Recent value; End Systole (A4C): 2 36 cm  LV MOD Diam; Recent value; End Systole (A4C): 2 48 cm  LV MOD Diam; Recent value; End Systole (A4C): 2 55 cm  LV MOD Diam; Recent value; End Systole (A4C): 2 55 cm  LV MOD Diam; Recent value; End Systole (A4C): 2 6 cm  LV MOD Diam; Recent value; End Systole (A4C): 2 65 cm  LV MOD Diam; Recent value; End Systole (A4C): 0 83 cm  LV MOD Diam; Recent value; End Systole (A4C): 1 19 cm  LV MOD Diam; Recent value;  End Systole (A4C): 1 51 cm  LV MOD Diam; Recent value; End Systole (A4C): 2 8 cm  LV MOD Diam; Recent value; End Systole (A4C): 3 01 cm  LV MOD Diam; Recent value; End Systole (A4C): 3 06 cm  LVEF MOD A4C: 68 8 %  Left Ventricle diastolic major axis; Most recent value chosen; Method of Disks,  Single Plane; 2D mode; Apical four chamber;: 7 88 cm Left Ventricle systolic  major axis; Most recent value chosen; Method of Disks, Single Plane; 2D mode; Apical four chamber;: 5 52 cm Left Ventricular Diastolic Area; Most recent  value chosen; Method of Disks, Single Plane; 2D mode; Apical four chamber;:  2810 mm2 Left Ventricular End Diastolic Volume; Most recent value chosen;  Method of Disks, Single Plane; 2D mode; Apical four chamber;: 82 2 cm3 Left  Ventricular End Systolic Volume; Most recent value chosen; Method of Disks,  Single Plane; 2D mode; Apical four chamber;: 25 7 cm3 Left Ventricular Systolic  Area; Most recent value chosen; Method of Disks, Single Plane; 2D mode; Apical  four chamber;: 1300 mm2  SV MOD A4C: 56 6 cm3    Tissue Doppler Imaging  LV Peak Early Vo Tissue Zafar; Medial MA (TDI): 69 6 mm/s  Left Ventricular Peak Early Diastolic Tissue Velocity; Mean; Mean value chosen;  Medial Mitral Annulus; Tissue Doppler Imaging;: 69 6 mm/s    Unspecified Scan Mode  MV Peak Zafar/LV Peak Tissue Zafar E-Wave; Medial MA: 12 4  Dec [place]; Antegrade Flow: 5140 mm/s2  Dec [place]; Mean; Antegrade Flow: 5140 mm/s2  MV A Zafar: 762 mm/s  MV E Zafar: 863 mm/s  MV E/A Ratio: 1 1  MV Peak A Zafar: 762 mm/s  MV Peak E Zafar; Mean; Antegrade Flow: 863 mm/s  Peak Grad; Mean; Regurgitant Flow: 21 mm[Hg]  Vmax; Mean; Regurgitant Flow: 2300 mm/s  Vmax; Regurgitant Flow: 2390 mm/s  Vmax; Regurgitant Flow: 2200 mm/s    [facility] Accredited Echocardiography Laboratory    Prepared and electronically signed by    Unique De Santiago MD  Signed [id number] 17:39:45       No results found for this or any previous visit    No results found for this or any previous visit  No results found for this or any previous visit        Scribe Attestation    I,:   Jarred Silva am acting as a scribe while in the presence of the attending physician :        I,:   Raul Zamorano DO personally performed the services described in this documentation    as scribed in my presence :

## 2019-11-05 ENCOUNTER — HOSPITAL ENCOUNTER (OUTPATIENT)
Dept: RADIOLOGY | Age: 74
Discharge: HOME | End: 2019-11-05
Payer: COMMERCIAL

## 2019-11-05 DIAGNOSIS — Z13.820 SCREENING FOR OSTEOPOROSIS: ICD-10-CM

## 2019-11-05 PROCEDURE — 77080 DXA BONE DENSITY AXIAL: CPT

## 2019-11-13 ENCOUNTER — OFFICE VISIT (OUTPATIENT)
Dept: OBGYN CLINIC | Facility: MEDICAL CENTER | Age: 74
End: 2019-11-13
Payer: COMMERCIAL

## 2019-11-13 VITALS
SYSTOLIC BLOOD PRESSURE: 138 MMHG | HEIGHT: 62 IN | BODY MASS INDEX: 29.63 KG/M2 | DIASTOLIC BLOOD PRESSURE: 72 MMHG | WEIGHT: 161 LBS

## 2019-11-13 DIAGNOSIS — N84.0 ENDOMETRIAL POLYP: ICD-10-CM

## 2019-11-13 DIAGNOSIS — N95.0 POSTMENOPAUSAL BLEEDING: Primary | ICD-10-CM

## 2019-11-13 PROCEDURE — 11420 EXC H-F-NK-SP B9+MARG 0.5/<: CPT | Performed by: OBSTETRICS & GYNECOLOGY

## 2019-11-13 PROCEDURE — 58100 BIOPSY OF UTERUS LINING: CPT | Performed by: OBSTETRICS & GYNECOLOGY

## 2019-11-13 PROCEDURE — 88305 TISSUE EXAM BY PATHOLOGIST: CPT | Performed by: PATHOLOGY

## 2019-11-13 NOTE — PROGRESS NOTES
Procedures this 77year-old patient about 4 days ago had weight each watery vaginal discharge with pink color to it  She has had no recurrence of this discharge since then and had none prior to the point  An endometrial biopsy was performed in the office with minimal tissue obtained  She did have a endocervical polyp at the external cervical os which was also removed  The diagnosis of postmenopausal bleeding was made

## 2019-11-13 NOTE — PATIENT INSTRUCTIONS
This 29-year-old patient an episode of pus metal also bleeding about 5 days ago  An endometrial biopsy and an endocervical polypectomy was performed

## 2019-11-20 ENCOUNTER — TELEPHONE (OUTPATIENT)
Dept: OBGYN CLINIC | Facility: CLINIC | Age: 74
End: 2019-11-20

## 2019-11-20 DIAGNOSIS — N95.0 POSTMENOPAUSAL BLEEDING: Primary | ICD-10-CM

## 2019-11-20 NOTE — TELEPHONE ENCOUNTER
Dear Dr Kamar Fernández:    Milka Melvin with Pt today via phone call  Pt informed that her endometrial biopsy result showed a benign (non-malignant) endometrial polyp, no further treatment required at this time per your review of biopsy result  Pt requests to speak with you directly as she has some concerns regarding continued inconsistent vaginal bleeding after having said procedure one week ago  Pt can be reached @ [phone] or [phone]  Pt is aware that she will receive a call upon your return to the office if not this evening  Please advise  Thank you!     Olivia Vang MA

## 2019-11-20 NOTE — TELEPHONE ENCOUNTER
----- Message from Wayne Kent MD sent at 11/20/2019  3:17 PM EST -----  Please call the patient regarding her endometrial biopsy showed a benign endometrial polyp     no further treatment is needed

## 2019-11-22 NOTE — TELEPHONE ENCOUNTER
Order a pelvic ultrasound for postmenopausal bleeding  I do not think we have had pelvic ultrasound ordered recently

## 2019-11-22 NOTE — TELEPHONE ENCOUNTER
Returned Pt's call today  Pt informed that her reported symptoms were addressed by Dr Geronimo Caldera  Pt advised to complete pelvic ultrasound for postmenopausal bleeding as recommended per Dr Geronimo Caldera  Pt given number to "Central Scheduling" to schedule ultrasound  Radiology order for US Pelvis Complete w Transvaginal completed in [facility], said order mailed today to Pt's mailing address in EHR per Pt's request   Reiterated to Pt that if she has any questions/concerns to contact office

## 2020-01-20 ENCOUNTER — TELEPHONE (OUTPATIENT)
Dept: DERMATOLOGY | Facility: CLINIC | Age: 75
End: 2020-01-20

## 2020-01-20 NOTE — TELEPHONE ENCOUNTER
Patient called to schedule appointment for herself and  at [facility] location

## 2020-04-27 ENCOUNTER — TELEMEDICINE (OUTPATIENT)
Dept: FAMILY MEDICINE CLINIC | Facility: CLINIC | Age: 75
End: 2020-04-27
Payer: COMMERCIAL

## 2020-04-27 VITALS
WEIGHT: 160 LBS | BODY MASS INDEX: 29.74 KG/M2 | TEMPERATURE: 96.4 F | SYSTOLIC BLOOD PRESSURE: 125 MMHG | DIASTOLIC BLOOD PRESSURE: 66 MMHG

## 2020-04-27 DIAGNOSIS — Z12.39 SCREENING FOR BREAST CANCER: ICD-10-CM

## 2020-04-27 DIAGNOSIS — R92.2 DENSE BREAST: ICD-10-CM

## 2020-04-27 DIAGNOSIS — Z00.00 MEDICARE ANNUAL WELLNESS VISIT, SUBSEQUENT: Primary | ICD-10-CM

## 2020-04-27 DIAGNOSIS — I10 ESSENTIAL HYPERTENSION: ICD-10-CM

## 2020-04-27 DIAGNOSIS — E03.8 SUBCLINICAL HYPOTHYROIDISM: ICD-10-CM

## 2020-04-27 PROCEDURE — 1036F TOBACCO NON-USER: CPT | Performed by: FAMILY MEDICINE

## 2020-04-27 PROCEDURE — G0439 PPPS, SUBSEQ VISIT: HCPCS | Performed by: FAMILY MEDICINE

## 2020-04-27 PROCEDURE — 1160F RVW MEDS BY RX/DR IN RCRD: CPT | Performed by: FAMILY MEDICINE

## 2020-04-27 PROCEDURE — 3288F FALL RISK ASSESSMENT DOCD: CPT | Performed by: FAMILY MEDICINE

## 2020-04-27 PROCEDURE — 1170F FXNL STATUS ASSESSED: CPT | Performed by: FAMILY MEDICINE

## 2020-04-27 PROCEDURE — 4040F PNEUMOC VAC/ADMIN/RCVD: CPT | Performed by: FAMILY MEDICINE

## 2020-04-27 PROCEDURE — 3078F DIAST BP <80 MM HG: CPT | Performed by: FAMILY MEDICINE

## 2020-04-27 PROCEDURE — 1101F PT FALLS ASSESS-DOCD LE1/YR: CPT | Performed by: FAMILY MEDICINE

## 2020-04-27 PROCEDURE — 1125F AMNT PAIN NOTED PAIN PRSNT: CPT | Performed by: FAMILY MEDICINE

## 2020-04-27 PROCEDURE — 3074F SYST BP LT 130 MM HG: CPT | Performed by: FAMILY MEDICINE

## 2020-04-27 RX ORDER — VALSARTAN AND HYDROCHLOROTHIAZIDE 320; 25 MG/1; MG/1
0.5 TABLET, FILM COATED ORAL 2 TIMES DAILY
Qty: 30 TABLET | Refills: 5 | Status: SHIPPED | OUTPATIENT
Start: 2020-04-27 | End: 2021-05-20

## 2020-06-26 ENCOUNTER — HOSPITAL ENCOUNTER (OUTPATIENT)
Dept: RADIOLOGY | Facility: MEDICAL CENTER | Age: 75
Discharge: HOME | End: 2020-06-26
Payer: COMMERCIAL

## 2020-06-26 VITALS — HEIGHT: 62 IN | BODY MASS INDEX: 29.44 KG/M2 | WEIGHT: 160 LBS

## 2020-06-26 DIAGNOSIS — Z12.39 SCREENING FOR BREAST CANCER: ICD-10-CM

## 2020-06-26 DIAGNOSIS — R92.2 DENSE BREAST: ICD-10-CM

## 2020-06-26 PROCEDURE — 77063 BREAST TOMOSYNTHESIS BI: CPT

## 2020-06-26 PROCEDURE — 77067 SCR MAMMO BI INCL CAD: CPT

## 2020-09-23 ENCOUNTER — OFFICE VISIT (OUTPATIENT)
Dept: CARDIOLOGY CLINIC | Facility: CLINIC | Age: 75
End: 2020-09-23
Payer: COMMERCIAL

## 2020-09-23 VITALS
HEIGHT: 62 IN | HEART RATE: 61 BPM | SYSTOLIC BLOOD PRESSURE: 152 MMHG | WEIGHT: 157 LBS | BODY MASS INDEX: 28.89 KG/M2 | DIASTOLIC BLOOD PRESSURE: 82 MMHG

## 2020-09-23 DIAGNOSIS — R35.0 INCREASED URINARY FREQUENCY: ICD-10-CM

## 2020-09-23 DIAGNOSIS — R00.2 PALPITATIONS: Primary | ICD-10-CM

## 2020-09-23 DIAGNOSIS — R42 EPISODIC LIGHTHEADEDNESS: ICD-10-CM

## 2020-09-23 DIAGNOSIS — I10 ESSENTIAL HYPERTENSION: ICD-10-CM

## 2020-09-23 PROCEDURE — 93000 ELECTROCARDIOGRAM COMPLETE: CPT | Performed by: INTERNAL MEDICINE

## 2020-09-23 PROCEDURE — 99214 OFFICE O/P EST MOD 30 MIN: CPT | Performed by: INTERNAL MEDICINE

## 2020-09-23 RX ORDER — AMLODIPINE AND VALSARTAN 5; 320 MG/1; MG/1
1 TABLET ORAL DAILY
Qty: 90 TABLET | Refills: 3 | Status: SHIPPED | OUTPATIENT
Start: 2020-09-23 | End: 2020-11-23

## 2020-09-23 RX ORDER — METRONIDAZOLE 500 MG/1
500 TABLET ORAL 2 TIMES DAILY
COMMUNITY
Start: 2020-09-05 | End: 2020-09-23 | Stop reason: ALTCHOICE

## 2020-09-23 NOTE — PROGRESS NOTES
EPS Progress Note - Anahy Ovalles 77 y o  female MRN: [id number]           ASSESSMENT:  1  Palpitations  POCT ECG   2  Essential hypertension  amLODIPine-valsartan (EXFORGE) 5-320 MG per tablet    Basic metabolic panel    Hepatic function panel   3  Episodic lightheadedness     4  Increased urinary frequency             PLAN:  1  History of intermittent palpitations  Secondary to PACs she has had very limited episodes over the past year  They generally last a few seconds at a time  She had extra beats for a total of 45 minutes in April otherwise very brief flutters lasting a few seconds    2  Hypertension  Well-controlled but she is having increased urinary frequency on her hydrochlorothiazide will change her to amlodipine valsartan combination and she can follow up with Dr Tyson Pierson in October after being on this combination for some time  Chem seven in 3-4 weeks post change    3  History presyncope no further presyncope        HPI:   Anahy Ovalles is a 77 y o  female who presents to the office today for a one year follow up  Patient reports occasional palpitations  She has kept a record over the last year and has had four days were she has had some palpitations  They generally last a few seconds the not cause lightheadedness dizziness or feeling like she is going to pass out  She did have which she believes is extra beats for 45 minutes on 04/25/2020  ROS:   Review of Systems   Constitution: Negative  HENT: Negative  Eyes: Negative for blurred vision and double vision  Cardiovascular: Positive for palpitations  Negative for chest pain, dyspnea on exertion, near-syncope and syncope  Respiratory: Negative for cough, shortness of breath and wheezing  Endocrine: Negative  Hematologic/Lymphatic: Negative  Skin: Negative  Musculoskeletal: Negative  Gastrointestinal: Negative  Genitourinary: Negative  Neurological: Negative  Psychiatric/Behavioral: Negative  Allergic/Immunologic: Negative  Objective:     Vitals: Blood pressure 152/82, pulse 61, height 5' 1 5" (1 562 m), weight 71 2 kg (157 lb), not currently breastfeeding , Body mass index is 29 18 kg/m²  ,        Physical Exam:    GEN: Sariah Lo appears well, alert and oriented x 3, pleasant and cooperative   HEENT: pupils equal, round, and reactive to light; extraocular muscles intact  NECK: supple, no carotid bruits   HEART: regular rhythm, normal S1 and S2, no murmurs, clicks, gallops or rubs   LUNGS: clear to auscultation bilaterally; no wheezes, rales, or rhonchi   ABDOMEN: normal bowel sounds, soft, no tenderness, no distention  EXTREMITIES: peripheral pulses normal; no clubbing, cyanosis, or edema  NEURO: no focal findings   SKIN: normal without suspicious lesions on exposed skin    Medications:      Current Outpatient Medications:     aspirin (ASPIRIN LOW DOSE) 81 MG tablet, Take 1 tablet by mouth daily, Disp: , Rfl:     Cholecalciferol (VITAMIN D3) 2000 units capsule, Take 1 tablet by mouth daily, Disp: , Rfl:     Magnesium 250 MG TABS, Take 250 tablets by mouth daily as needed, Disp: , Rfl:     metroNIDAZOLE (METROGEL) 1 % gel, Apply topically, Disp: , Rfl:     Multiple Vitamin-Folic Acid TABS, Take 1 tablet by mouth daily, Disp: , Rfl:     Psyllium (METAMUCIL) 28 3 % POWD, Take by mouth, Disp: , Rfl:     amLODIPine-valsartan (EXFORGE) 5-320 MG per tablet, Take 1 tablet by mouth daily, Disp: 90 tablet, Rfl: 3     Family History   Problem Relation Age of Onset    Hypertension Mother     Coronary artery disease Father     Diverticulitis Father         Colon    Stroke Father         Syndrome    Venous thrombosis Father         of the deep Vessels of the distal lower extremity    Diabetes Brother     Lung cancer Brother 48    Venous thrombosis Brother         of the deep vessels of the distal Lower extremity    Melanoma Brother     Diabetes Paternal Grandfather     Lung cancer Family         Adenocarcinoma of the Lung    Other Family         Disorders of Blood and Blood-forming Organs, Skin Disorder    Heart disease Family     Colon cancer Paternal Aunt     No Known Problems Sister     No Known Problems Daughter     No Known Problems Maternal Grandmother     No Known Problems Maternal Grandfather     No Known Problems Paternal Grandmother      Social History     Socioeconomic History    Marital status: /Civil Union     Spouse name: Not on file    Number of children: Not on file    Years of education: Not on file    Highest education level: Not on file   Occupational History    Not on file   Social Needs    Financial resource strain: Not on file    Food insecurity     Worry: Not on file     Inability: Not on file   [employer] needs     Medical: Not on file     Non-medical: Not on file   Tobacco Use    Smoking status: Never Smoker    Smokeless tobacco: Never Used   Substance and Sexual Activity    Alcohol use: Yes     Comment: social drinker    Drug use: No    Sexual activity: Not Currently   Lifestyle    Physical activity     Days per week: Not on file     Minutes per session: Not on file    Stress: Not on file   Relationships    Social connections     Talks on phone: Not on file     Gets together: Not on file     Attends Confucianist service: Not on file     Active member of club or organization: Not on file     Attends meetings of clubs or organizations: Not on file     Relationship status: Not on file    Intimate partner violence     Fear of current or ex partner: Not on file     Emotionally abused: Not on file     Physically abused: Not on file     Forced sexual activity: Not on file   Other Topics Concern    Not on file   Social History Narrative    Caffeine use    Exercise frequency      Social History     Tobacco Use   Smoking Status Never Smoker   Smokeless Tobacco Never Used     Social History     Substance and Sexual Activity   Alcohol Use Yes Comment: social drinker       Labs & Results:  Below is the patient's most recent value for Albumin, ALT, AST, BUN, Calcium, Chloride, Cholesterol, CO2, Creatinine, GFR, Glucose, HDL, Hematocrit, Hemoglobin, Hemoglobin A1C, LDL, Magnesium, Phosphorus, Platelets, Potassium, PSA, Sodium, Triglycerides, and WBC  Lab Results   Component Value Date    ALT 16 2019    AST 17 2019    BUN 15 2019    CALCIUM 9 4 2019     2019    CHOL 195 2017    CO2 29 2019    CREATININE 0 82 2019    HDL 56 2019    HCT 36 5 2019    HGB 12 3 2019    MG 2 1 2016     2019    K 4 0 2019     2017    TRIG 116 2019    WBC 4 8 2019     Note: for a comprehensive list of the patient's lab results, access the Results Review activity  Cardiac testing:   Results for orders placed during the hospital encounter of 16   Echo complete with contrast if indicated    Narrative [facility] 175  [facility], [address] [place] [address]  [phone]    Transthoracic Echocardiogram  2D, M-mode, Doppler, and Color Doppler    Study date:  2016    Patient: Alber Lemos  MR number: [id number]  Account number: [de-identified]  : 1945  Age: 72 years  Gender: Female  Status: Outpatient  Location: Bedside  Height: 61 in  Weight: 159 lb  BP: 140/ 62 mmHg    Indications: Palpitations  Diagnoses: R00 2 - Palpitations    Sonographer:  ADORE Rodriguez  Primary Physician:  Rubin Bender DO  Group:  [facility]  Interpreting Physician:  Yary Jackson MD    SUMMARY    LEFT VENTRICLE:  Systolic function was normal by visual assessment  Ejection fraction was  estimated to be 70 %  There were no regional wall motion abnormalities  RIGHT VENTRICLE:  The size was normal   Systolic function was normal     MITRAL VALVE:  There was mild regurgitation      TRICUSPID VALVE:  There was mild to moderate regurgitation  PULMONIC VALVE:  There was trace regurgitation  IVC, HEPATIC VEINS:  Respirophasic changes were blunted (less than 50% variation)  HISTORY: PRIOR HISTORY: Risk factors: hypertension  PROCEDURE: The procedure was performed at the bedside  This was a routine  study  The transthoracic approach was used  The study included complete 2D  imaging, M-mode, complete spectral Doppler, and color Doppler  The heart rate  was 60 bpm, at the start of the study  Images were obtained from the  parasternal, apical, subcostal, and suprasternal notch acoustic windows  Echocardiographic views were limited due to poor acoustic window availability,  decreased penetration, and lung interference  This was a technically difficult  study  LEFT VENTRICLE: Size was normal  Systolic function was normal by visual  assessment  Ejection fraction was estimated to be 70 %  There were no regional  wall motion abnormalities  Wall thickness was normal  DOPPLER: Left ventricular  diastolic function parameters were normal     RIGHT VENTRICLE: The size was normal  Systolic function was normal  Wall  thickness was normal     LEFT ATRIUM: Size was normal     RIGHT ATRIUM: Size was normal     MITRAL VALVE: Valve structure was normal  There was normal leaflet separation  DOPPLER: The transmitral velocity was within the normal range  There was no  evidence for stenosis  There was mild regurgitation  AORTIC VALVE: The valve was trileaflet  Leaflets exhibited normal thickness and  normal cuspal separation  DOPPLER: Transaortic velocity was within the normal  range  There was no evidence for stenosis  There was no significant  regurgitation  TRICUSPID VALVE: The valve structure was normal  There was normal leaflet  separation  DOPPLER: The transtricuspid velocity was within the normal range  There was no evidence for stenosis  There was mild to moderate regurgitation    Estimated peak PA pressure was 25 mmHg     PULMONIC VALVE: Not well visualized  DOPPLER: The transpulmonic velocity was  within the normal range  There was trace regurgitation  PERICARDIUM: There was no pericardial effusion  The pericardium was normal in  appearance  AORTA: The root exhibited normal size  SYSTEMIC VEINS: IVC: The inferior vena cava was normal in size  Respirophasic  changes were blunted (less than 50% variation)  MEASUREMENT TABLES    OTHER ECHO MEASUREMENTS  (Reference normals)  Estimated CVP   8 mmHg   (--)    SYSTEM MEASUREMENT TABLES    2D mode  AV Diam: 3 5 cm  AoR Diam; Mean (2D): 3 5 cm  LA Diam (2D): 3 4 cm  LA Dimension; Mean (2D): 3 4 cm  LA/Ao (2D): 0 97  EDV (2D-Cubed): 41 8 cm3  EF (2D-Cubed): 77 5 %  ESV (2D-Cubed): 9 39 cm3  FS (2D-Cubed): 39 2 %  FS (2D-Teich): 39 2 %  IVS/LVPW (2D): 1 09  IVSd (2D): 0 9 cm  IVSd; Mean chosen (2D): 0 9 cm  LVIDd (2D): 3 47 cm  LVIDd; Mean (2D): 3 47 cm  LVIDs (2D): 2 11 cm  LVIDs; Mean (2D): 2 11 cm  LVPWd (2D): 0 83 cm  LVPWd; Mean (2D): 0 83 cm  Left Ventricular Ejection Fraction; Teichholz; 2D mode;: 70 7 %  Left Ventricular End Diastolic Volume; Teichholz; 2D mode;: 49 8 cm3  Left Ventricular End Systolic Volume; Teichholz; 2D mode;: 14 6 cm3  SV (2D-Cubed): 32 4 cm3  Stroke Volume; Teichholz; 2D mode;: 35 2 cm3  RVIDd (2D): 3 59 cm  RVIDd; Mean (2D): 3 59 cm    Apical four chamber  LV MOD Diam; Recent value; End Diastole (A4C): 1 85 cm  LV MOD Diam; Recent value; End Diastole (A4C): 4 14 cm  LV MOD Diam; Recent value; End Diastole (A4C): 3 84 cm  LV MOD Diam; Recent value; End Diastole (A4C): 4 24 cm  LV MOD Diam; Recent value; End Diastole (A4C): 4 26 cm  LV MOD Diam; Recent value; End Diastole (A4C): 4 21 cm  LV MOD Diam; Recent value; End Diastole (A4C): 4 03 cm  LV MOD Diam; Recent value; End Diastole (A4C): 1 5 cm  LV MOD Diam; Recent value; End Diastole (A4C): 2 24 cm  LV MOD Diam; Recent value; End Diastole (A4C): 2 71 cm  LV MOD Diam; Recent value;  End Diastole (A4C): 3 03 cm  LV MOD Diam; Recent value; End Diastole (A4C): 3 27 cm  LV MOD Diam; Recent value; End Diastole (A4C): 3 52 cm  LV MOD Diam; Recent value; End Diastole (A4C): 3 74 cm  LV MOD Diam; Recent value; End Diastole (A4C): 3 89 cm  LV MOD Diam; Recent value; End Diastole (A4C): 3 96 cm  LV MOD Diam; Recent value; End Diastole (A4C): 3 99 cm  LV MOD Diam; Recent value; End Diastole (A4C): 4 11 cm  LV MOD Diam; Recent value; End Diastole (A4C): 4 24 cm  LV MOD Diam; Recent value; End Diastole (A4C): 4 26 cm  LV MOD Diam; Recent value; End Systole (A4C): 1 65 cm  LV MOD Diam; Recent value; End Systole (A4C): 3 06 cm  LV MOD Diam; Recent value; End Systole (A4C): 3 06 cm  LV MOD Diam; Recent value; End Systole (A4C): 2 92 cm  LV MOD Diam; Recent value; End Systole (A4C): 2 72 cm  LV MOD Diam; Recent value; End Systole (A4C): 1 75 cm  LV MOD Diam; Recent value; End Systole (A4C): 2 02 cm  LV MOD Diam; Recent value; End Systole (A4C): 2 21 cm  LV MOD Diam; Recent value; End Systole (A4C): 2 36 cm  LV MOD Diam; Recent value; End Systole (A4C): 2 48 cm  LV MOD Diam; Recent value; End Systole (A4C): 2 55 cm  LV MOD Diam; Recent value; End Systole (A4C): 2 55 cm  LV MOD Diam; Recent value; End Systole (A4C): 2 6 cm  LV MOD Diam; Recent value; End Systole (A4C): 2 65 cm  LV MOD Diam; Recent value; End Systole (A4C): 0 83 cm  LV MOD Diam; Recent value; End Systole (A4C): 1 19 cm  LV MOD Diam; Recent value; End Systole (A4C): 1 51 cm  LV MOD Diam; Recent value; End Systole (A4C): 2 8 cm  LV MOD Diam; Recent value; End Systole (A4C): 3 01 cm  LV MOD Diam; Recent value; End Systole (A4C): 3 06 cm  LVEF MOD A4C: 68 8 %  Left Ventricle diastolic major axis; Most recent value chosen; Method of Disks,  Single Plane; 2D mode; Apical four chamber;: 7 88 cm Left Ventricle systolic  major axis; Most recent value chosen; Method of Disks, Single Plane; 2D mode;   Apical four chamber;: 5 52 cm Left Ventricular Diastolic Area; Most recent  value chosen; Method of Disks, Single Plane; 2D mode; Apical four chamber;:  2810 mm2 Left Ventricular End Diastolic Volume; Most recent value chosen;  Method of Disks, Single Plane; 2D mode; Apical four chamber;: 82 2 cm3 Left  Ventricular End Systolic Volume; Most recent value chosen; Method of Disks,  Single Plane; 2D mode; Apical four chamber;: 25 7 cm3 Left Ventricular Systolic  Area; Most recent value chosen; Method of Disks, Single Plane; 2D mode; Apical  four chamber;: 1300 mm2  SV MOD A4C: 56 6 cm3    Tissue Doppler Imaging  LV Peak Early Vo Tissue Zafar; Medial MA (TDI): 69 6 mm/s  Left Ventricular Peak Early Diastolic Tissue Velocity; Mean; Mean value chosen;  Medial Mitral Annulus; Tissue Doppler Imaging;: 69 6 mm/s    Unspecified Scan Mode  MV Peak Zafar/LV Peak Tissue Zafar E-Wave; Medial MA: 12 4  Dec Bullock; Antegrade Flow: 5140 mm/s2  Dec Bullock; Mean; Antegrade Flow: 5140 mm/s2  MV A Zafar: 762 mm/s  MV E Zafar: 863 mm/s  MV E/A Ratio: 1 1  MV Peak A Zafar: 762 mm/s  MV Peak E Zafar; Mean; Antegrade Flow: 863 mm/s  Peak Grad; Mean; Regurgitant Flow: 21 mm[Hg]  Vmax; Mean; Regurgitant Flow: 2300 mm/s  Vmax; Regurgitant Flow: 2390 mm/s  Vmax; Regurgitant Flow: 2200 mm/s    [facility] Accredited [facility]    Prepared and electronically signed by    Lord Robert MD  Signed [id number] 17:39:45       No results found for this or any previous visit  No results found for this or any previous visit  No results found for this or any previous visit

## 2020-09-23 NOTE — PATIENT INSTRUCTIONS
Obtain lab work  Discontinue losartan hydrochlorothiazide  Start amlodipine valsartan 5-320 mg daily

## 2020-10-06 LAB
ALBUMIN SERPL-MCNC: 3.9 G/DL (ref 3.6–5.1)
ALBUMIN/GLOB SERPL: 1.4 (CALC) (ref 1–2.5)
ALP SERPL-CCNC: 99 U/L (ref 37–153)
ALT SERPL-CCNC: 26 U/L (ref 6–29)
AST SERPL-CCNC: 20 U/L (ref 10–35)
BILIRUB DIRECT SERPL-MCNC: 0.1 MG/DL
BILIRUB INDIRECT SERPL-MCNC: 0.3 MG/DL (CALC) (ref 0.2–1.2)
BILIRUB SERPL-MCNC: 0.4 MG/DL (ref 0.2–1.2)
GLOBULIN SER CALC-MCNC: 2.7 G/DL (CALC) (ref 1.9–3.7)
PROT SERPL-MCNC: 6.6 G/DL (ref 6.1–8.1)

## 2020-10-07 LAB
ALBUMIN SERPL-MCNC: 4.1 G/DL (ref 3.6–5.1)
ALBUMIN/GLOB SERPL: 1.5 (CALC) (ref 1–2.5)
ALP SERPL-CCNC: 101 U/L (ref 37–153)
ALT SERPL-CCNC: 26 U/L (ref 6–29)
AST SERPL-CCNC: 20 U/L (ref 10–35)
BILIRUB SERPL-MCNC: 0.4 MG/DL (ref 0.2–1.2)
BUN SERPL-MCNC: 22 MG/DL (ref 7–25)
BUN/CREAT SERPL: NORMAL (CALC) (ref 6–22)
CALCIUM SERPL-MCNC: 9.2 MG/DL (ref 8.6–10.4)
CHLORIDE SERPL-SCNC: 105 MMOL/L (ref 98–110)
CHOLEST SERPL-MCNC: 168 MG/DL
CHOLEST/HDLC SERPL: 2.8 (CALC)
CO2 SERPL-SCNC: 27 MMOL/L (ref 20–32)
CREAT SERPL-MCNC: 0.72 MG/DL (ref 0.6–0.93)
ERYTHROCYTE [DISTWIDTH] IN BLOOD BY AUTOMATED COUNT: 13 % (ref 11–15)
GLOBULIN SER CALC-MCNC: 2.7 G/DL (CALC) (ref 1.9–3.7)
GLUCOSE SERPL-MCNC: 87 MG/DL (ref 65–99)
HCT VFR BLD AUTO: 36.6 % (ref 35–45)
HDLC SERPL-MCNC: 59 MG/DL
HGB BLD-MCNC: 12.3 G/DL (ref 11.7–15.5)
LDLC SERPL CALC-MCNC: 92 MG/DL (CALC)
MCH RBC QN AUTO: 31.5 PG (ref 27–33)
MCHC RBC AUTO-ENTMCNC: 33.6 G/DL (ref 32–36)
MCV RBC AUTO: 93.6 FL (ref 80–100)
NONHDLC SERPL-MCNC: 109 MG/DL (CALC)
PLATELET # BLD AUTO: 236 THOUSAND/UL (ref 140–400)
PMV BLD REES-ECKER: 11.7 FL (ref 7.5–12.5)
POTASSIUM SERPL-SCNC: 4.5 MMOL/L (ref 3.5–5.3)
PROT SERPL-MCNC: 6.8 G/DL (ref 6.1–8.1)
RBC # BLD AUTO: 3.91 MILLION/UL (ref 3.8–5.1)
SL AMB EGFR AFRICAN AMERICAN: 96 ML/MIN/1.73M2
SL AMB EGFR NON AFRICAN AMERICAN: 82 ML/MIN/1.73M2
SODIUM SERPL-SCNC: 139 MMOL/L (ref 135–146)
TRIGL SERPL-MCNC: 79 MG/DL
TSH SERPL-ACNC: 3.4 MIU/L (ref 0.4–4.5)
WBC # BLD AUTO: 4.9 THOUSAND/UL (ref 3.8–10.8)

## 2020-10-14 ENCOUNTER — OFFICE VISIT (OUTPATIENT)
Dept: FAMILY MEDICINE CLINIC | Facility: CLINIC | Age: 75
End: 2020-10-14
Payer: COMMERCIAL

## 2020-10-14 VITALS
DIASTOLIC BLOOD PRESSURE: 70 MMHG | HEIGHT: 62 IN | TEMPERATURE: 98 F | HEART RATE: 68 BPM | OXYGEN SATURATION: 98 % | RESPIRATION RATE: 14 BRPM | WEIGHT: 160.4 LBS | SYSTOLIC BLOOD PRESSURE: 122 MMHG | BODY MASS INDEX: 29.52 KG/M2

## 2020-10-14 DIAGNOSIS — L71.9 ROSACEA: ICD-10-CM

## 2020-10-14 DIAGNOSIS — R42 EPISODIC LIGHTHEADEDNESS: ICD-10-CM

## 2020-10-14 DIAGNOSIS — E03.8 SUBCLINICAL HYPOTHYROIDISM: ICD-10-CM

## 2020-10-14 DIAGNOSIS — I10 ESSENTIAL HYPERTENSION: Primary | ICD-10-CM

## 2020-10-14 PROCEDURE — 1036F TOBACCO NON-USER: CPT | Performed by: FAMILY MEDICINE

## 2020-10-14 PROCEDURE — 1160F RVW MEDS BY RX/DR IN RCRD: CPT | Performed by: FAMILY MEDICINE

## 2020-10-14 PROCEDURE — 99214 OFFICE O/P EST MOD 30 MIN: CPT | Performed by: FAMILY MEDICINE

## 2020-10-14 PROCEDURE — 3078F DIAST BP <80 MM HG: CPT | Performed by: FAMILY MEDICINE

## 2020-10-14 PROCEDURE — 3074F SYST BP LT 130 MM HG: CPT | Performed by: FAMILY MEDICINE

## 2020-11-23 ENCOUNTER — TELEPHONE (OUTPATIENT)
Dept: CARDIOLOGY CLINIC | Facility: CLINIC | Age: 75
End: 2020-11-23

## 2020-11-23 DIAGNOSIS — I10 ESSENTIAL HYPERTENSION: Primary | ICD-10-CM

## 2020-11-27 DIAGNOSIS — I10 ESSENTIAL HYPERTENSION: Primary | ICD-10-CM

## 2020-11-27 RX ORDER — AMLODIPINE AND VALSARTAN 5; 320 MG/1; MG/1
1 TABLET ORAL DAILY
Qty: 90 TABLET | Refills: 3 | Status: SHIPPED | COMMUNITY
Start: 2020-11-27 | End: 2020-12-09 | Stop reason: SDUPTHER

## 2020-11-27 RX ORDER — HYDROCHLOROTHIAZIDE 12.5 MG/1
12.5 TABLET ORAL DAILY
Qty: 90 TABLET | Refills: 3 | Status: SHIPPED | OUTPATIENT
Start: 2020-11-27 | End: 2021-05-20

## 2020-12-09 DIAGNOSIS — I10 ESSENTIAL HYPERTENSION: ICD-10-CM

## 2020-12-09 RX ORDER — AMLODIPINE AND VALSARTAN 5; 320 MG/1; MG/1
1 TABLET ORAL DAILY
Qty: 90 TABLET | Refills: 3 | Status: SHIPPED | OUTPATIENT
Start: 2020-12-09 | End: 2021-05-20

## 2021-01-20 DIAGNOSIS — Z23 ENCOUNTER FOR IMMUNIZATION: ICD-10-CM

## 2021-01-23 ENCOUNTER — IMMUNIZATIONS (OUTPATIENT)
Dept: FAMILY MEDICINE CLINIC | Facility: HOSPITAL | Age: 76
End: 2021-01-23

## 2021-01-23 DIAGNOSIS — Z23 ENCOUNTER FOR IMMUNIZATION: Primary | ICD-10-CM

## 2021-01-23 PROCEDURE — 0011A SARS-COV-2 / COVID-19 MRNA VACCINE (MODERNA) 100 MCG: CPT

## 2021-01-23 PROCEDURE — 91301 SARS-COV-2 / COVID-19 MRNA VACCINE (MODERNA) 100 MCG: CPT

## 2021-02-18 ENCOUNTER — IMMUNIZATIONS (OUTPATIENT)
Dept: FAMILY MEDICINE CLINIC | Facility: HOSPITAL | Age: 76
End: 2021-02-18

## 2021-02-18 DIAGNOSIS — Z23 ENCOUNTER FOR IMMUNIZATION: Primary | ICD-10-CM

## 2021-02-18 PROCEDURE — 91301 SARS-COV-2 / COVID-19 MRNA VACCINE (MODERNA) 100 MCG: CPT

## 2021-02-18 PROCEDURE — 0012A SARS-COV-2 / COVID-19 MRNA VACCINE (MODERNA) 100 MCG: CPT

## 2021-04-20 ENCOUNTER — RA CDI HCC (OUTPATIENT)
Dept: OTHER | Facility: HOSPITAL | Age: 76
End: 2021-04-20

## 2021-04-20 NOTE — PROGRESS NOTES
[facility] 75  coding opportunities          Chart reviewed, no opportunity found: CHART REVIEWED, NO OPPORTUNITY FOUND              Patients insurance company: [facility] [address]  (Medicare Advantage and Commercial)

## 2021-05-20 ENCOUNTER — OFFICE VISIT (OUTPATIENT)
Dept: FAMILY MEDICINE CLINIC | Facility: CLINIC | Age: 76
End: 2021-05-20
Payer: COMMERCIAL

## 2021-05-20 VITALS
BODY MASS INDEX: 29.52 KG/M2 | OXYGEN SATURATION: 99 % | SYSTOLIC BLOOD PRESSURE: 122 MMHG | DIASTOLIC BLOOD PRESSURE: 58 MMHG | RESPIRATION RATE: 16 BRPM | WEIGHT: 160.4 LBS | TEMPERATURE: 98.7 F | HEIGHT: 62 IN | HEART RATE: 64 BPM

## 2021-05-20 DIAGNOSIS — Z12.31 ENCOUNTER FOR SCREENING MAMMOGRAM FOR MALIGNANT NEOPLASM OF BREAST: ICD-10-CM

## 2021-05-20 DIAGNOSIS — N39.41 URGE INCONTINENCE OF URINE: ICD-10-CM

## 2021-05-20 DIAGNOSIS — I10 ESSENTIAL HYPERTENSION: ICD-10-CM

## 2021-05-20 DIAGNOSIS — Z00.00 MEDICARE ANNUAL WELLNESS VISIT, SUBSEQUENT: Primary | ICD-10-CM

## 2021-05-20 DIAGNOSIS — E03.8 SUBCLINICAL HYPOTHYROIDISM: ICD-10-CM

## 2021-05-20 PROCEDURE — 1036F TOBACCO NON-USER: CPT | Performed by: FAMILY MEDICINE

## 2021-05-20 PROCEDURE — G0439 PPPS, SUBSEQ VISIT: HCPCS | Performed by: FAMILY MEDICINE

## 2021-05-20 PROCEDURE — 1170F FXNL STATUS ASSESSED: CPT | Performed by: FAMILY MEDICINE

## 2021-05-20 PROCEDURE — 3078F DIAST BP <80 MM HG: CPT | Performed by: FAMILY MEDICINE

## 2021-05-20 PROCEDURE — 1160F RVW MEDS BY RX/DR IN RCRD: CPT | Performed by: FAMILY MEDICINE

## 2021-05-20 PROCEDURE — 3074F SYST BP LT 130 MM HG: CPT | Performed by: FAMILY MEDICINE

## 2021-05-20 PROCEDURE — 1125F AMNT PAIN NOTED PAIN PRSNT: CPT | Performed by: FAMILY MEDICINE

## 2021-05-20 PROCEDURE — 3008F BODY MASS INDEX DOCD: CPT | Performed by: FAMILY MEDICINE

## 2021-05-20 PROCEDURE — 3288F FALL RISK ASSESSMENT DOCD: CPT | Performed by: FAMILY MEDICINE

## 2021-05-20 PROCEDURE — 3725F SCREEN DEPRESSION PERFORMED: CPT | Performed by: FAMILY MEDICINE

## 2021-05-20 PROCEDURE — 1101F PT FALLS ASSESS-DOCD LE1/YR: CPT | Performed by: FAMILY MEDICINE

## 2021-05-20 RX ORDER — VALSARTAN AND HYDROCHLOROTHIAZIDE 320; 25 MG/1; MG/1
0.5 TABLET, FILM COATED ORAL 2 TIMES DAILY
Qty: 90 TABLET | Refills: 3 | Status: SHIPPED | OUTPATIENT
Start: 2021-05-20 | End: 2022-05-09 | Stop reason: SDUPTHER

## 2021-05-20 RX ORDER — VALSARTAN AND HYDROCHLOROTHIAZIDE 320; 25 MG/1; MG/1
0.5 TABLET, FILM COATED ORAL 2 TIMES DAILY
Qty: 30 TABLET | Refills: 5
Start: 2021-05-20 | End: 2021-05-20 | Stop reason: SDUPTHER

## 2021-05-20 RX ORDER — SOLIFENACIN SUCCINATE 5 MG/1
5 TABLET, FILM COATED ORAL DAILY
Qty: 30 TABLET | Refills: 5 | Status: SHIPPED | OUTPATIENT
Start: 2021-05-20 | End: 2021-11-21

## 2021-05-20 NOTE — PROGRESS NOTES
Assessment and Plan:     Problem List Items Addressed This Visit        Endocrine    Subclinical hypothyroidism    Relevant Orders    TSH, 3rd generation with Free T4 reflex       Cardiovascular and Mediastinum    Hypertension    Relevant Medications    valsartan-hydrochlorothiazide (DIOVAN-HCT) 320-25 MG per tablet    Other Relevant Orders    Comprehensive metabolic panel    CBC    Lipid Panel with Direct LDL reflex       Other    Medicare annual wellness visit, subsequent - Primary      Other Visit Diagnoses     Encounter for screening mammogram for malignant neoplasm of breast        Relevant Orders    Mammo screening bilateral w 3d & cad        BMI Counseling: Body mass index is 29 44 kg/m²  The BMI is above normal  Nutrition recommendations include encouraging healthy choices of fruits and vegetables  Exercise recommendations include exercising 3-5 times per week  No pharmacotherapy was ordered  Preventive health issues were discussed with patient, and age appropriate screening tests were ordered as noted in patient's After Visit Summary  Personalized health advice and appropriate referrals for health education or preventive services given if needed, as noted in patient's After Visit Summary       History of Present Illness:     Patient presents for Medicare Annual Wellness visit    Patient Care Team:  Zeb Randall DO as PCP - MD Gabrielle Chambers MD     Problem List:     Patient Active Problem List   Diagnosis    History of basal cell carcinoma (BCC) of skin    Diverticulosis    Hypertension    Osteopenia    Palpitations    Rosacea    Subclinical hypothyroidism    Vitamin D deficiency    Medicare annual wellness visit, subsequent    Episodic lightheadedness    Increased urinary frequency      Past Medical and Surgical History:     Past Medical History:   Diagnosis Date    Basal cell carcinoma 1991    Osteoporosis     Rosacea      Past Surgical History:   Procedure Laterality Date    ADENOIDECTOMY      BREAST CYST EXCISION Left 1989    BREAST CYST EXCISION Left 1989    BREAST CYST EXCISION Right 1980    HEMORRHOID SURGERY      INCISIONAL BREAST BIOPSY Left     OTHER SURGICAL HISTORY      Arthrocentesis Aspiration of Ganglion Cyst    TONSILLECTOMY      UTERINE FIBROID EMBOLIZATION        Family History:     Family History   Problem Relation Age of Onset    Hypertension Mother     Coronary artery disease Father     Diverticulitis Father         Colon    Stroke Father         Syndrome    Venous thrombosis Father         of the deep Vessels of the distal lower extremity    Diabetes Brother     Lung cancer Brother 48    Venous thrombosis Brother         of the deep vessels of the distal Lower extremity    Melanoma Brother     Diabetes Paternal Grandfather     Lung cancer Family         Adenocarcinoma of the Lung    Other Family         Disorders of Blood and Blood-forming Organs, Skin Disorder    Heart disease Family     Colon cancer Paternal Aunt     No Known Problems Sister     No Known Problems Daughter     No Known Problems Maternal Grandmother     No Known Problems Maternal Grandfather     No Known Problems Paternal Grandmother       Social History:        Social History     Socioeconomic History    Marital status: /Civil Union     Spouse name: Not on file    Number of children: Not on file    Years of education: Not on file    Highest education level: Not on file   Occupational History    Not on file   Social Needs    Financial resource strain: Not on file    Food insecurity     Worry: Not on file     Inability: Not on file   [employer] needs     Medical: Not on file     Non-medical: Not on file   Tobacco Use    Smoking status: Never Smoker    Smokeless tobacco: Never Used   Substance and Sexual Activity    Alcohol use: Yes     Comment: social drinker    Drug use: No    Sexual activity: Not Currently Lifestyle    Physical activity     Days per week: Not on file     Minutes per session: Not on file    Stress: Not on file   Relationships    Social connections     Talks on phone: Not on file     Gets together: Not on file     Attends [employer] service: Not on file     Active member of club or organization: Not on file     Attends meetings of clubs or organizations: Not on file     Relationship status: Not on file    Intimate partner violence     Fear of current or ex partner: Not on file     Emotionally abused: Not on file     Physically abused: Not on file     Forced sexual activity: Not on file   Other Topics Concern    Not on file   Social History Narrative    Caffeine use    Exercise frequency       Medications and Allergies:     Current Outpatient Medications   Medication Sig Dispense Refill    aspirin (ASPIRIN LOW DOSE) 81 MG tablet Take 1 tablet by mouth daily      Cholecalciferol (VITAMIN D3) 2000 units capsule Take 1 tablet by mouth daily      Magnesium 250 MG TABS Take 250 tablets by mouth daily as needed      metroNIDAZOLE (METROGEL) 1 % gel Apply topically      Multiple Vitamin-Folic Acid TABS Take 1 tablet by mouth daily      Psyllium (METAMUCIL) 28 3 % POWD Take by mouth      valsartan-hydrochlorothiazide (DIOVAN-HCT) 320-25 MG per tablet Take 0 5 tablets by mouth 2 (two) times a day 90 tablet 3     No current facility-administered medications for this visit        Allergies   Allergen Reactions    Other     Tetanus Antitoxin Hives    Penicillins Rash      Immunizations:     Immunization History   Administered Date(s) Administered    INFLUENZA 10/18/2019    Influenza Quadrivalent Preservative Free 3 years and older IM 09/30/2016    Influenza Split High Dose Preservative Free IM 09/25/2015    Influenza, high dose seasonal 0 7 mL 10/02/2020    Pneumococcal Conjugate 13-Valent 06/09/2015    Pneumococcal Polysaccharide PPV23 12/02/2011    SARS-CoV-2 / COVID-19 mRNA IM (Rosalie Stockton) 01/23/2021, 02/18/2021    Zoster 12/10/2012    Zoster Vaccine Recombinant 05/05/2019, 08/19/2019      Health Maintenance:         Topic Date Due    Colorectal Cancer Screening  04/06/2027    Hepatitis C Screening  Completed     There are no preventive care reminders to display for this patient  Medicare Health Risk Assessment:     /58   Pulse 64   Temp 98 7 °F (37 1 °C) (Tympanic)   Resp 16   Ht 5' 1 89" (1 572 m)   Wt 72 8 kg (160 lb 6 4 oz)   SpO2 99%   BMI 29 44 kg/m²      Mark Owen is here for her Subsequent Wellness visit  Last Medicare Wellness visit information reviewed, patient interviewed and updates made to the record today  Health Risk Assessment:   Patient rates overall health as very good  Patient feels that their physical health rating is same  Patient is very satisfied with their life  Eyesight was rated as same  Hearing was rated as same  Patient feels that their emotional and mental health rating is same  Patients states they are never, rarely angry  Patient states they are never, rarely unusually tired/fatigued  Pain experienced in the last 7 days has been none  Patient states that she has experienced no weight loss or gain in last 6 months  Depression Screening:   PHQ-2 Score: 0      Fall Risk Screening: In the past year, patient has experienced: no history of falling in past year      Urinary Incontinence Screening:   Patient has leaked urine accidently in the last six months  Home Safety:  Patient does not have trouble with stairs inside or outside of their home  Patient has working smoke alarms and has no working carbon monoxide detector  Home safety hazards include: none  Nutrition:   Current diet is Low Cholesterol, Low Saturated Fat and Limited junk food  Medications:   Patient is currently taking over-the-counter supplements  OTC medications include: Vitamin D3/2000, 1-a-day vitamin, Viactive  Patient is able to manage medications       Activities of Daily Living (ADLs)/Instrumental Activities of Daily Living (IADLs):   Walk and transfer into and out of bed and chair?: Yes  Dress and groom yourself?: Yes    Bathe or shower yourself?: Yes    Feed yourself? Yes  Do your laundry/housekeeping?: Yes  Manage your money, pay your bills and track your expenses?: Yes  Make your own meals?: Yes    Do your own shopping?: Yes    Previous Hospitalizations:   Any hospitalizations or ED visits within the last 12 months?: No      Advance Care Planning:   Living will: No    Durable POA for healthcare: No    Advanced directive: No      Cognitive Screening:   Provider or family/friend/caregiver concerned regarding cognition?: No    PREVENTIVE SCREENINGS      Cardiovascular Screening:    General: Screening Current      Diabetes Screening:     General: Screening Current      Colorectal Cancer Screening:     General: Screening Current      Breast Cancer Screening:     General: Screening Current      Cervical Cancer Screening:    General: Screening Not Indicated      Abdominal Aortic Aneurysm (AAA) Screening:        General: Screening Not Indicated      Lung Cancer Screening:     General: Screening Not Indicated      Hepatitis C Screening:    General: Screening Current    Screening, Brief Intervention, and Referral to Treatment (SBIRT)    Screening  Typical number of drinks in a day: 0  Typical number of drinks in a week: 2  Interpretation: Low risk drinking behavior      AUDIT-C Screenin) How often did you have a drink containing alcohol in the past year? 2 to 4 times a month  2) How many drinks did you have on a typical day when you were drinking in the past year? never  3) How often did you have 6 or more drinks on one occasion in the past year? never    AUDIT-C Score: 2  Interpretation: Score 0-2 (female): Negative screen for alcohol misuse    Single Item Drug Screening:  How often have you used an illegal drug (including marijuana) or a prescription medication for non-medical reasons in the past year? never    Single Item Drug Screen Score: 0  Interpretation: Negative screen for possible drug use disorder    Other Counseling Topics:   Regular weightbearing exercise         Ashanti Espinosa, DO

## 2021-09-13 ENCOUNTER — ANNUAL EXAM (OUTPATIENT)
Dept: OBGYN CLINIC | Facility: CLINIC | Age: 76
End: 2021-09-13
Payer: COMMERCIAL

## 2021-09-13 VITALS — WEIGHT: 158.4 LBS | SYSTOLIC BLOOD PRESSURE: 122 MMHG | DIASTOLIC BLOOD PRESSURE: 70 MMHG | BODY MASS INDEX: 29.07 KG/M2

## 2021-09-13 DIAGNOSIS — Z01.419 ENCNTR FOR GYN EXAM (GENERAL) (ROUTINE) W/O ABN FINDINGS: Primary | ICD-10-CM

## 2021-09-13 DIAGNOSIS — Z01.419 GYNECOLOGIC EXAM NORMAL: ICD-10-CM

## 2021-09-13 PROCEDURE — G0101 CA SCREEN;PELVIC/BREAST EXAM: HCPCS | Performed by: OBSTETRICS & GYNECOLOGY

## 2021-09-13 PROCEDURE — G0145 SCR C/V CYTO,THINLAYER,RESCR: HCPCS | Performed by: OBSTETRICS & GYNECOLOGY

## 2021-09-13 NOTE — PATIENT INSTRUCTIONS
The patient was told that her pelvic and breast exam are normal   There was no evidence of cystocele or uterine prolapse  She will call if she sees any vaginal bleeding over the next 2 years

## 2021-09-13 NOTE — PROGRESS NOTES
Assessment/Plan: This 56-year-old patient is seen for her gyn evaluation  She has no specific complaints at this time  No problem-specific Assessment & Plan notes found for this encounter  Subjective:      Patient ID: Kasey Gilmore is a 77 y o  female  This 56-year-old patient has no vaginal bleeding over the past year  She has no vulvar irritation  She occasionally will notice a quarter size spot on her underwear  She does not have to wear liners or pads for this  She does take VESIcare  To control urine loss  She has had no urinary tract infections over the past year  She does take Metamucil daily and has no problem with the bleeding with the bowels or diarrhea  She has no chronic headaches or fainting spells or hot flashes  She weight is 158 lb her blood pressure 120/70  Her last mammogram was June 26, 2020  She will a mammogram in  November of 2021  She had a normal colonoscopy 2017  She had a DEXA scan in 2019  [place] is not an issue at this time  Review of Systems   Constitutional: Negative  HENT: Negative  Eyes: Negative  Respiratory: Negative  Cardiovascular: Negative  Gastrointestinal: Negative  Endocrine: Negative  Genitourinary:        She does leak urine slightly in small amounts during the day  Musculoskeletal: Negative  Skin: Negative  Allergic/Immunologic:        She is allergic to penicillin  Neurological: Negative  Hematological: Negative  Psychiatric/Behavioral: Negative  Objective:      /70 (BP Location: Right arm, Patient Position: Sitting, Cuff Size: Large)   Wt 71 8 kg (158 lb 6 4 oz)   LMP  (LMP Unknown)   Breastfeeding No   BMI 29 07 kg/m²          Physical Exam  Vitals and nursing note reviewed  Constitutional:       Appearance: Normal appearance  HENT:      Head: Normocephalic        Nose: Nose normal       Mouth/Throat:      Mouth: Mucous membranes are moist    Eyes:      Extraocular Movements: Extraocular movements intact  Pupils: Pupils are equal, round, and reactive to light  Cardiovascular:      Rate and Rhythm: Normal rate and regular rhythm  Pulses: Normal pulses  Heart sounds: Normal heart sounds  No murmur heard  Pulmonary:      Effort: Pulmonary effort is normal       Breath sounds: Normal breath sounds  Abdominal:      General: Abdomen is flat  Bowel sounds are normal       Palpations: Abdomen is soft  Genitourinary:     General: Normal vulva  Rectum: Normal       Comments: Breast exam reveals no palpable masses  There is no evidence of nipple inversion or discoloration of the skin of the breasts  Pelvic exam reveals uterus to be anterior mobile normal size  No adnexal masses are identified  The cervix is normal to appearance  There is no blood or discharge in the vagina  There is no evidence of cystocele or rectocele  Rectal exam reveals no blood or masses in the rectum no nodularity in the cul-de-sac  The vulva is normal   Musculoskeletal:         General: Normal range of motion  Cervical back: Normal range of motion  Skin:     General: Skin is warm and dry  Neurological:      General: No focal deficit present  Mental Status: She is alert and oriented to person, place, and time     Psychiatric:         Mood and Affect: Mood normal          Behavior: Behavior normal

## 2021-09-16 LAB
LAB AP GYN PRIMARY INTERPRETATION: NORMAL
Lab: NORMAL

## 2021-09-30 ENCOUNTER — OFFICE VISIT (OUTPATIENT)
Dept: CARDIOLOGY CLINIC | Facility: CLINIC | Age: 76
End: 2021-09-30
Payer: COMMERCIAL

## 2021-09-30 VITALS
HEART RATE: 62 BPM | WEIGHT: 156.6 LBS | DIASTOLIC BLOOD PRESSURE: 60 MMHG | HEIGHT: 62 IN | SYSTOLIC BLOOD PRESSURE: 124 MMHG | BODY MASS INDEX: 28.82 KG/M2

## 2021-09-30 DIAGNOSIS — R42 EPISODIC LIGHTHEADEDNESS: ICD-10-CM

## 2021-09-30 DIAGNOSIS — R00.2 PALPITATIONS: Primary | ICD-10-CM

## 2021-09-30 DIAGNOSIS — I10 ESSENTIAL HYPERTENSION: ICD-10-CM

## 2021-09-30 PROCEDURE — 93000 ELECTROCARDIOGRAM COMPLETE: CPT | Performed by: INTERNAL MEDICINE

## 2021-09-30 PROCEDURE — 99213 OFFICE O/P EST LOW 20 MIN: CPT | Performed by: INTERNAL MEDICINE

## 2021-09-30 NOTE — PROGRESS NOTES
EPS Progress Note - Aguila Arroyo 77 y o  female MRN: [id number]           ASSESSMENT:  1  Palpitations  POCT ECG   2  Essential hypertension     3  Episodic lightheadedness             PLAN:  1  History of intermittent palpitations  Secondary to PACs she has had very limited episodes over the past year but she did have on day where she had on and off palpitations all day  I asked her to purchase a [facility] so that if she has on and off palpitations all day she can send me a tracing  I want to make sure she is not having afib  2  Hypertension  Well-controlled  She had intractable swelling on the amlodipine she is tolerating combination of angiotensin receptor blocker and diuretic well    3  History presyncope no further presyncope        HPI:   Aguila Arroyo is a 77 y o  female who presents to the office today for a one year follow up  Patient reports occasional palpitations  October 6 extra beats for 20 minutes and the 20th then again in December 6 and 8 brief flutter  Dec 9 and 10th brief flutters  Deonte once, February all day had extra beats  No until April extra beats for about an hour  Not again until July thud  Not again until September 28 th         ROS:   Review of Systems   Constitutional: Negative  HENT: Negative  Eyes: Negative for blurred vision and double vision  Cardiovascular: Positive for palpitations  Negative for chest pain, dyspnea on exertion, near-syncope and syncope  Respiratory: Negative for cough, shortness of breath and wheezing  Endocrine: Negative  Hematologic/Lymphatic: Negative  Skin: Negative  Musculoskeletal: Negative  Gastrointestinal: Negative  Genitourinary: Negative  Neurological: Negative  Psychiatric/Behavioral: Negative  Allergic/Immunologic: Negative             Objective:     Vitals: Blood pressure 124/60, pulse 62, height 5' 2" (1 575 m), weight 71 kg (156 lb 9 6 oz), not currently breastfeeding , Body mass index is 28 64 kg/m²  ,        Physical Exam:    GEN: Fritz Lefort appears well, alert and oriented x 3, pleasant and cooperative   HEENT: pupils equal, round, and reactive to light; extraocular muscles intact  NECK: supple, no carotid bruits   HEART: regular rhythm, normal S1 and S2, no murmurs, clicks, gallops or rubs   LUNGS: clear to auscultation bilaterally; no wheezes, rales, or rhonchi   ABDOMEN: normal bowel sounds, soft, no tenderness, no distention  EXTREMITIES: peripheral pulses normal; no clubbing, cyanosis, or edema  NEURO: no focal findings   SKIN: normal without suspicious lesions on exposed skin    Medications:      Current Outpatient Medications:     aspirin (ASPIRIN LOW DOSE) 81 MG tablet, Take 1 tablet by mouth daily, Disp: , Rfl:     Cholecalciferol (VITAMIN D3) 2000 units capsule, Take 1 tablet by mouth daily, Disp: , Rfl:     Magnesium 250 MG TABS, Take 250 tablets by mouth daily as needed, Disp: , Rfl:     metroNIDAZOLE (METROGEL) 1 % gel, Apply topically, Disp: , Rfl:     Multiple Vitamin-Folic Acid TABS, Take 1 tablet by mouth daily, Disp: , Rfl:     Psyllium (METAMUCIL) 28 3 % POWD, Take by mouth, Disp: , Rfl:     solifenacin (VESICARE) 5 mg tablet, Take 1 tablet (5 mg total) by mouth daily, Disp: 30 tablet, Rfl: 5    valsartan-hydrochlorothiazide (DIOVAN-HCT) 320-25 MG per tablet, Take 0 5 tablets by mouth 2 (two) times a day, Disp: 90 tablet, Rfl: 3     Family History   Problem Relation Age of Onset    Hypertension Mother     Coronary artery disease Father     Diverticulitis Father         Colon    Stroke Father         Syndrome    Venous thrombosis Father         of the deep Vessels of the distal lower extremity    Diabetes Brother     Lung cancer Brother 48    Venous thrombosis Brother         of the deep vessels of the distal Lower extremity    Melanoma Brother     Diabetes Paternal Grandfather     Lung cancer Family         Adenocarcinoma of the Lung    Other Family Disorders of Blood and Blood-forming Organs, Skin Disorder    Heart disease Family     Colon cancer Paternal Aunt     No Known Problems Sister     No Known Problems Daughter     No Known Problems Maternal Grandmother     No Known Problems Maternal Grandfather     No Known Problems Paternal Grandmother      Social History     Socioeconomic History    Marital status: /Civil Union     Spouse name: Not on file    Number of children: Not on file    Years of education: Not on file    Highest education level: Not on file   Occupational History    Not on file   Tobacco Use    Smoking status: Never Smoker    Smokeless tobacco: Never Used   Substance and Sexual Activity    Alcohol use: Yes     Comment: social drinker    Drug use: No    Sexual activity: Yes     Partners: Male     Birth control/protection: Post-menopausal   Other Topics Concern    Not on file   Social History Narrative    Caffeine use    Exercise frequency      Social Determinants of Health     Financial Resource Strain:     Difficulty of Paying Living Expenses:    Food Insecurity:     Worried About Running Out of Food in the Last Year:     Ran Out of Food in the Last Year:    Transportation Needs:     Lack of Transportation (Medical):      Lack of Transportation (Non-Medical):    Physical Activity:     Days of Exercise per Week:     Minutes of Exercise per Session:    Stress:     Feeling of Stress :    Social Connections:     Frequency of Communication with Friends and Family:     Frequency of Social Gatherings with Friends and Family:     Attends [employer] Services:     Active Member of Clubs or Organizations:     Attends Club or Organization Meetings:     Marital Status:    Intimate Partner Violence:     Fear of Current or Ex-Partner:     Emotionally Abused:     Physically Abused:     Sexually Abused:      Social History     Tobacco Use   Smoking Status Never Smoker   Smokeless Tobacco Never Used     Social History     Substance and Sexual Activity   Alcohol Use Yes    Comment: social drinker       Labs & Results:  Below is the patient's most recent value for Albumin, ALT, AST, BUN, Calcium, Chloride, Cholesterol, CO2, Creatinine, GFR, Glucose, HDL, Hematocrit, Hemoglobin, Hemoglobin A1C, LDL, Magnesium, Phosphorus, Platelets, Potassium, PSA, Sodium, Triglycerides, and WBC  Lab Results   Component Value Date    ALT 26 10/06/2020    AST 20 10/06/2020    BUN 22 10/06/2020    CALCIUM 9 2 10/06/2020     10/06/2020    CHOL 195 2017    CO2 27 10/06/2020    CREATININE 0 72 10/06/2020    HDL 59 10/06/2020    HCT 36 6 10/06/2020    HGB 12 3 10/06/2020    MG 2 1 2016     10/06/2020    K 4 5 10/06/2020     2017    TRIG 79 10/06/2020    WBC 4 9 10/06/2020     Note: for a comprehensive list of the patient's lab results, access the Results Review activity  Cardiac testing:   Results for orders placed during the hospital encounter of 16   Echo complete with contrast if indicated    Narrative [facility] [address]  [address], [address] [facility]  [phone]    Transthoracic Echocardiogram  2D, M-mode, Doppler, and Color Doppler    Study date:  2016    Patient: Janene Jackson  MR number: [id number]  Account number: [de-identified]  : 1945  Age: 72 years  Gender: Female  Status: Outpatient  Location: Bedside  Height: 61 in  Weight: 159 lb  BP: 140/ 62 mmHg    Indications: Palpitations  Diagnoses: R00 2 - Palpitations    Sonographer:  ADORE Chopra  Primary Physician:  Starr Valdivia DO  Group:  [facility]  Interpreting Physician:  Jerzy House MD    SUMMARY    LEFT VENTRICLE:  Systolic function was normal by visual assessment  Ejection fraction was  estimated to be 70 %  There were no regional wall motion abnormalities      RIGHT VENTRICLE:  The size was normal   Systolic function was normal     MITRAL VALVE:  There was mild regurgitation  TRICUSPID VALVE:  There was mild to moderate regurgitation  PULMONIC VALVE:  There was trace regurgitation  IVC, HEPATIC VEINS:  Respirophasic changes were blunted (less than 50% variation)  HISTORY: PRIOR HISTORY: Risk factors: hypertension  PROCEDURE: The procedure was performed at the bedside  This was a routine  study  The transthoracic approach was used  The study included complete 2D  imaging, M-mode, complete spectral Doppler, and color Doppler  The heart rate  was 60 bpm, at the start of the study  Images were obtained from the  parasternal, apical, subcostal, and suprasternal notch acoustic windows  Echocardiographic views were limited due to poor acoustic window availability,  decreased penetration, and lung interference  This was a technically difficult  study  LEFT VENTRICLE: Size was normal  Systolic function was normal by visual  assessment  Ejection fraction was estimated to be 70 %  There were no regional  wall motion abnormalities  Wall thickness was normal  DOPPLER: Left ventricular  diastolic function parameters were normal     RIGHT VENTRICLE: The size was normal  Systolic function was normal  Wall  thickness was normal     LEFT ATRIUM: Size was normal     RIGHT ATRIUM: Size was normal     MITRAL VALVE: Valve structure was normal  There was normal leaflet separation  DOPPLER: The transmitral velocity was within the normal range  There was no  evidence for stenosis  There was mild regurgitation  AORTIC VALVE: The valve was trileaflet  Leaflets exhibited normal thickness and  normal cuspal separation  DOPPLER: Transaortic velocity was within the normal  range  There was no evidence for stenosis  There was no significant  regurgitation  TRICUSPID VALVE: The valve structure was normal  There was normal leaflet  separation  DOPPLER: The transtricuspid velocity was within the normal range  There was no evidence for stenosis   There was mild to moderate regurgitation  Estimated peak PA pressure was 25 mmHg  PULMONIC VALVE: Not well visualized  DOPPLER: The transpulmonic velocity was  within the normal range  There was trace regurgitation  PERICARDIUM: There was no pericardial effusion  The pericardium was normal in  appearance  AORTA: The root exhibited normal size  SYSTEMIC VEINS: IVC: The inferior vena cava was normal in size  Respirophasic  changes were blunted (less than 50% variation)  MEASUREMENT TABLES    OTHER ECHO MEASUREMENTS  (Reference normals)  Estimated CVP   8 mmHg   (--)    SYSTEM MEASUREMENT TABLES    2D mode  AV Diam: 3 5 cm  AoR Diam; Mean (2D): 3 5 cm  LA Diam (2D): 3 4 cm  LA Dimension; Mean (2D): 3 4 cm  LA/Ao (2D): 0 97  EDV (2D-Cubed): 41 8 cm3  EF (2D-Cubed): 77 5 %  ESV (2D-Cubed): 9 39 cm3  FS (2D-Cubed): 39 2 %  FS (2D-Teich): 39 2 %  IVS/LVPW (2D): 1 09  IVSd (2D): 0 9 cm  IVSd; Mean chosen (2D): 0 9 cm  LVIDd (2D): 3 47 cm  LVIDd; Mean (2D): 3 47 cm  LVIDs (2D): 2 11 cm  LVIDs; Mean (2D): 2 11 cm  LVPWd (2D): 0 83 cm  LVPWd; Mean (2D): 0 83 cm  Left Ventricular Ejection Fraction; Teichholz; 2D mode;: 70 7 %  Left Ventricular End Diastolic Volume; Teichholz; 2D mode;: 49 8 cm3  Left Ventricular End Systolic Volume; Teichholz; 2D mode;: 14 6 cm3  SV (2D-Cubed): 32 4 cm3  Stroke Volume; Teichholz; 2D mode;: 35 2 cm3  RVIDd (2D): 3 59 cm  RVIDd; Mean (2D): 3 59 cm    Apical four chamber  LV MOD Diam; Recent value; End Diastole (A4C): 1 85 cm  LV MOD Diam; Recent value; End Diastole (A4C): 4 14 cm  LV MOD Diam; Recent value; End Diastole (A4C): 3 84 cm  LV MOD Diam; Recent value; End Diastole (A4C): 4 24 cm  LV MOD Diam; Recent value; End Diastole (A4C): 4 26 cm  LV MOD Diam; Recent value; End Diastole (A4C): 4 21 cm  LV MOD Diam; Recent value; End Diastole (A4C): 4 03 cm  LV MOD Diam; Recent value; End Diastole (A4C): 1 5 cm  LV MOD Diam; Recent value;  End Diastole (A4C): 2 24 cm  LV MOD Diam; Recent value; End Diastole (A4C): 2 71 cm  LV MOD Diam; Recent value; End Diastole (A4C): 3 03 cm  LV MOD Diam; Recent value; End Diastole (A4C): 3 27 cm  LV MOD Diam; Recent value; End Diastole (A4C): 3 52 cm  LV MOD Diam; Recent value; End Diastole (A4C): 3 74 cm  LV MOD Diam; Recent value; End Diastole (A4C): 3 89 cm  LV MOD Diam; Recent value; End Diastole (A4C): 3 96 cm  LV MOD Diam; Recent value; End Diastole (A4C): 3 99 cm  LV MOD Diam; Recent value; End Diastole (A4C): 4 11 cm  LV MOD Diam; Recent value; End Diastole (A4C): 4 24 cm  LV MOD Diam; Recent value; End Diastole (A4C): 4 26 cm  LV MOD Diam; Recent value; End Systole (A4C): 1 65 cm  LV MOD Diam; Recent value; End Systole (A4C): 3 06 cm  LV MOD Diam; Recent value; End Systole (A4C): 3 06 cm  LV MOD Diam; Recent value; End Systole (A4C): 2 92 cm  LV MOD Diam; Recent value; End Systole (A4C): 2 72 cm  LV MOD Diam; Recent value; End Systole (A4C): 1 75 cm  LV MOD Diam; Recent value; End Systole (A4C): 2 02 cm  LV MOD Diam; Recent value; End Systole (A4C): 2 21 cm  LV MOD Diam; Recent value; End Systole (A4C): 2 36 cm  LV MOD Diam; Recent value; End Systole (A4C): 2 48 cm  LV MOD Diam; Recent value; End Systole (A4C): 2 55 cm  LV MOD Diam; Recent value; End Systole (A4C): 2 55 cm  LV MOD Diam; Recent value; End Systole (A4C): 2 6 cm  LV MOD Diam; Recent value; End Systole (A4C): 2 65 cm  LV MOD Diam; Recent value; End Systole (A4C): 0 83 cm  LV MOD Diam; Recent value; End Systole (A4C): 1 19 cm  LV MOD Diam; Recent value; End Systole (A4C): 1 51 cm  LV MOD Diam; Recent value; End Systole (A4C): 2 8 cm  LV MOD Diam; Recent value; End Systole (A4C): 3 01 cm  LV MOD Diam; Recent value; End Systole (A4C): 3 06 cm  LVEF MOD A4C: 68 8 %  Left Ventricle diastolic major axis; Most recent value chosen; Method of Disks,  Single Plane; 2D mode; Apical four chamber;: 7 88 cm Left Ventricle systolic  major axis;  Most recent value chosen; Method of Disks, Single Plane; 2D mode;  Apical four chamber;: 5 52 cm Left Ventricular Diastolic Area; Most recent  value chosen; Method of Disks, Single Plane; 2D mode; Apical four chamber;:  2810 mm2 Left Ventricular End Diastolic Volume; Most recent value chosen;  Method of Disks, Single Plane; 2D mode; Apical four chamber;: 82 2 cm3 Left  Ventricular End Systolic Volume; Most recent value chosen; Method of Disks,  Single Plane; 2D mode; Apical four chamber;: 25 7 cm3 Left Ventricular Systolic  Area; Most recent value chosen; Method of Disks, Single Plane; 2D mode; Apical  four chamber;: 1300 mm2  SV MOD A4C: 56 6 cm3    Tissue Doppler Imaging  LV Peak Early Vo Tissue Zafar; Medial MA (TDI): 69 6 mm/s  Left Ventricular Peak Early Diastolic Tissue Velocity; Mean; Mean value chosen;  Medial Mitral Annulus; Tissue Doppler Imaging;: 69 6 mm/s    Unspecified Scan Mode  MV Peak Zafar/LV Peak Tissue Zafar E-Wave; Medial MA: 12 4  Dec [place]; Antegrade Flow: 5140 mm/s2  Dec [place]; Mean; Antegrade Flow: 5140 mm/s2  MV A Zafar: 762 mm/s  MV E Zafar: 863 mm/s  MV E/A Ratio: 1 1  MV Peak A Zafar: 762 mm/s  MV Peak E Zafar; Mean; Antegrade Flow: 863 mm/s  Peak Grad; Mean; Regurgitant Flow: 21 mm[Hg]  Vmax; Mean; Regurgitant Flow: 2300 mm/s  Vmax; Regurgitant Flow: 2390 mm/s  Vmax; Regurgitant Flow: 2200 mm/s    [facility]    Prepared and electronically signed by    Isabella Bonner MD  Signed [id number] 17:[id number]       No results found for this or any previous visit  No results found for this or any previous visit  No results found for this or any previous visit

## 2021-10-04 ENCOUNTER — HOSPITAL ENCOUNTER (OUTPATIENT)
Dept: RADIOLOGY | Facility: MEDICAL CENTER | Age: 76
Discharge: HOME | End: 2021-10-04
Payer: COMMERCIAL

## 2021-10-04 VITALS — WEIGHT: 156 LBS | HEIGHT: 62 IN | BODY MASS INDEX: 28.71 KG/M2

## 2021-10-04 DIAGNOSIS — Z12.31 ENCOUNTER FOR SCREENING MAMMOGRAM FOR MALIGNANT NEOPLASM OF BREAST: ICD-10-CM

## 2021-10-04 PROCEDURE — 77063 BREAST TOMOSYNTHESIS BI: CPT

## 2021-10-04 PROCEDURE — 77067 SCR MAMMO BI INCL CAD: CPT

## 2021-10-15 ENCOUNTER — HOSPITAL ENCOUNTER (OUTPATIENT)
Dept: RADIOLOGY | Facility: HOSPITAL | Age: 76
Discharge: HOME | End: 2021-10-15
Payer: COMMERCIAL

## 2021-10-15 DIAGNOSIS — R92.8 ABNORMAL MAMMOGRAM: ICD-10-CM

## 2021-10-15 PROCEDURE — 76642 ULTRASOUND BREAST LIMITED: CPT

## 2021-10-28 ENCOUNTER — IMMUNIZATIONS (OUTPATIENT)
Dept: FAMILY MEDICINE CLINIC | Facility: MEDICAL CENTER | Age: 76
End: 2021-10-28

## 2021-10-28 DIAGNOSIS — Z23 ENCOUNTER FOR IMMUNIZATION: Primary | ICD-10-CM

## 2021-10-28 LAB
ALBUMIN SERPL-MCNC: 3.8 G/DL (ref 3.6–5.1)
ALBUMIN/GLOB SERPL: 1.3 (CALC) (ref 1–2.5)
ALP SERPL-CCNC: 103 U/L (ref 37–153)
ALT SERPL-CCNC: 14 U/L (ref 6–29)
AST SERPL-CCNC: 17 U/L (ref 10–35)
BILIRUB SERPL-MCNC: 0.6 MG/DL (ref 0.2–1.2)
BUN SERPL-MCNC: 19 MG/DL (ref 7–25)
BUN/CREAT SERPL: NORMAL (CALC) (ref 6–22)
CALCIUM SERPL-MCNC: 9.4 MG/DL (ref 8.6–10.4)
CHLORIDE SERPL-SCNC: 104 MMOL/L (ref 98–110)
CHOLEST SERPL-MCNC: 201 MG/DL
CHOLEST/HDLC SERPL: 3.7 (CALC)
CO2 SERPL-SCNC: 30 MMOL/L (ref 20–32)
CREAT SERPL-MCNC: 0.75 MG/DL (ref 0.6–0.93)
ERYTHROCYTE [DISTWIDTH] IN BLOOD BY AUTOMATED COUNT: 12.8 % (ref 11–15)
GLOBULIN SER CALC-MCNC: 3 G/DL (CALC) (ref 1.9–3.7)
GLUCOSE SERPL-MCNC: 93 MG/DL (ref 65–99)
HCT VFR BLD AUTO: 36.3 % (ref 35–45)
HDLC SERPL-MCNC: 54 MG/DL
HGB BLD-MCNC: 11.9 G/DL (ref 11.7–15.5)
LDLC SERPL CALC-MCNC: 127 MG/DL (CALC)
MCH RBC QN AUTO: 30.9 PG (ref 27–33)
MCHC RBC AUTO-ENTMCNC: 32.8 G/DL (ref 32–36)
MCV RBC AUTO: 94.3 FL (ref 80–100)
NONHDLC SERPL-MCNC: 147 MG/DL (CALC)
PLATELET # BLD AUTO: 264 THOUSAND/UL (ref 140–400)
PMV BLD REES-ECKER: 11.5 FL (ref 7.5–12.5)
POTASSIUM SERPL-SCNC: 4 MMOL/L (ref 3.5–5.3)
PROT SERPL-MCNC: 6.8 G/DL (ref 6.1–8.1)
RBC # BLD AUTO: 3.85 MILLION/UL (ref 3.8–5.1)
SL AMB EGFR AFRICAN AMERICAN: 90 ML/MIN/1.73M2
SL AMB EGFR NON AFRICAN AMERICAN: 78 ML/MIN/1.73M2
SODIUM SERPL-SCNC: 140 MMOL/L (ref 135–146)
T4 FREE SERPL-MCNC: 1.2 NG/DL (ref 0.8–1.8)
TRIGL SERPL-MCNC: 94 MG/DL
TSH SERPL-ACNC: 5.26 MIU/L (ref 0.4–4.5)
WBC # BLD AUTO: 6 THOUSAND/UL (ref 3.8–10.8)

## 2021-11-21 ENCOUNTER — TELEPHONE (OUTPATIENT)
Dept: OTHER | Facility: OTHER | Age: 76
End: 2021-11-21

## 2022-05-09 DIAGNOSIS — I10 ESSENTIAL HYPERTENSION: ICD-10-CM

## 2022-05-09 DIAGNOSIS — N39.41 URGE INCONTINENCE OF URINE: ICD-10-CM

## 2022-05-09 RX ORDER — VALSARTAN AND HYDROCHLOROTHIAZIDE 320; 25 MG/1; MG/1
TABLET, FILM COATED ORAL
Qty: 90 TABLET | Refills: 3 | Status: SHIPPED | OUTPATIENT
Start: 2022-05-09 | End: 2022-05-24 | Stop reason: SDUPTHER

## 2022-05-09 RX ORDER — SOLIFENACIN SUCCINATE 5 MG/1
TABLET, FILM COATED ORAL
Qty: 30 TABLET | Refills: 0 | Status: SHIPPED | OUTPATIENT
Start: 2022-05-09 | End: 2022-05-10 | Stop reason: SDUPTHER

## 2022-05-10 DIAGNOSIS — N39.41 URGE INCONTINENCE OF URINE: ICD-10-CM

## 2022-05-10 RX ORDER — SOLIFENACIN SUCCINATE 5 MG/1
TABLET, FILM COATED ORAL
Qty: 90 TABLET | Refills: 1 | Status: SHIPPED | OUTPATIENT
Start: 2022-05-10 | End: 2022-05-24 | Stop reason: SDUPTHER

## 2022-05-24 ENCOUNTER — OFFICE VISIT (OUTPATIENT)
Dept: FAMILY MEDICINE CLINIC | Facility: CLINIC | Age: 77
End: 2022-05-24
Payer: COMMERCIAL

## 2022-05-24 VITALS
HEIGHT: 62 IN | OXYGEN SATURATION: 100 % | DIASTOLIC BLOOD PRESSURE: 60 MMHG | BODY MASS INDEX: 28.16 KG/M2 | WEIGHT: 153 LBS | SYSTOLIC BLOOD PRESSURE: 140 MMHG | TEMPERATURE: 98.7 F | HEART RATE: 69 BPM | RESPIRATION RATE: 13 BRPM

## 2022-05-24 DIAGNOSIS — I10 ESSENTIAL HYPERTENSION: ICD-10-CM

## 2022-05-24 DIAGNOSIS — E03.8 SUBCLINICAL HYPOTHYROIDISM: ICD-10-CM

## 2022-05-24 DIAGNOSIS — I10 PRIMARY HYPERTENSION: ICD-10-CM

## 2022-05-24 DIAGNOSIS — Z00.00 MEDICARE ANNUAL WELLNESS VISIT, SUBSEQUENT: Primary | ICD-10-CM

## 2022-05-24 DIAGNOSIS — L71.9 ROSACEA: ICD-10-CM

## 2022-05-24 DIAGNOSIS — Z12.31 ENCOUNTER FOR SCREENING MAMMOGRAM FOR MALIGNANT NEOPLASM OF BREAST: ICD-10-CM

## 2022-05-24 DIAGNOSIS — Z13.820 ENCOUNTER FOR OSTEOPOROSIS SCREENING IN ASYMPTOMATIC POSTMENOPAUSAL PATIENT: ICD-10-CM

## 2022-05-24 DIAGNOSIS — Z78.0 ENCOUNTER FOR OSTEOPOROSIS SCREENING IN ASYMPTOMATIC POSTMENOPAUSAL PATIENT: ICD-10-CM

## 2022-05-24 DIAGNOSIS — N39.41 URGE INCONTINENCE OF URINE: ICD-10-CM

## 2022-05-24 PROBLEM — M72.2 PLANTAR FASCIITIS OF LEFT FOOT: Status: ACTIVE | Noted: 2022-05-24

## 2022-05-24 PROBLEM — R60.0 SWELLING OF RIGHT PAROTID GLAND: Status: ACTIVE | Noted: 2022-05-24

## 2022-05-24 PROBLEM — K21.9 GERD WITHOUT ESOPHAGITIS: Status: ACTIVE | Noted: 2022-05-24

## 2022-05-24 PROCEDURE — 3077F SYST BP >= 140 MM HG: CPT | Performed by: FAMILY MEDICINE

## 2022-05-24 PROCEDURE — 99214 OFFICE O/P EST MOD 30 MIN: CPT | Performed by: FAMILY MEDICINE

## 2022-05-24 PROCEDURE — 1090F PRES/ABSN URINE INCON ASSESS: CPT | Performed by: FAMILY MEDICINE

## 2022-05-24 PROCEDURE — 3078F DIAST BP <80 MM HG: CPT | Performed by: FAMILY MEDICINE

## 2022-05-24 PROCEDURE — 1101F PT FALLS ASSESS-DOCD LE1/YR: CPT | Performed by: FAMILY MEDICINE

## 2022-05-24 PROCEDURE — 1170F FXNL STATUS ASSESSED: CPT | Performed by: FAMILY MEDICINE

## 2022-05-24 PROCEDURE — 1036F TOBACCO NON-USER: CPT | Performed by: FAMILY MEDICINE

## 2022-05-24 PROCEDURE — 3288F FALL RISK ASSESSMENT DOCD: CPT | Performed by: FAMILY MEDICINE

## 2022-05-24 PROCEDURE — 3725F SCREEN DEPRESSION PERFORMED: CPT | Performed by: FAMILY MEDICINE

## 2022-05-24 PROCEDURE — 1125F AMNT PAIN NOTED PAIN PRSNT: CPT | Performed by: FAMILY MEDICINE

## 2022-05-24 PROCEDURE — 1160F RVW MEDS BY RX/DR IN RCRD: CPT | Performed by: FAMILY MEDICINE

## 2022-05-24 PROCEDURE — G0439 PPPS, SUBSEQ VISIT: HCPCS | Performed by: FAMILY MEDICINE

## 2022-05-24 RX ORDER — METRONIDAZOLE 7.5 MG/G
GEL TOPICAL 2 TIMES DAILY
Qty: 45 G | Refills: 5 | Status: SHIPPED | OUTPATIENT
Start: 2022-05-24

## 2022-05-24 RX ORDER — SOLIFENACIN SUCCINATE 5 MG/1
5 TABLET, FILM COATED ORAL DAILY
Qty: 90 TABLET | Refills: 3 | Status: SHIPPED | OUTPATIENT
Start: 2022-05-24 | End: 2022-06-23

## 2022-05-24 RX ORDER — VALSARTAN AND HYDROCHLOROTHIAZIDE 320; 25 MG/1; MG/1
0.5 TABLET, FILM COATED ORAL 2 TIMES DAILY
Qty: 90 TABLET | Refills: 3 | Status: SHIPPED | OUTPATIENT
Start: 2022-05-24

## 2022-05-24 NOTE — PATIENT INSTRUCTIONS
Medicare Preventive Visit Patient Instructions  Thank you for completing your Welcome to Medicare Visit or Medicare Annual Wellness Visit today  Your next wellness visit will be due in one year (5/25/2023)  The screening/preventive services that you may require over the next 5-10 years are detailed below  Some tests may not apply to you based off risk factors and/or age  Screening tests ordered at today's visit but not completed yet may show as past due  Also, please note that scanned in results may not display below  Preventive Screenings:  Service Recommendations Previous Testing/Comments   Colorectal Cancer Screening  * Colonoscopy    * Fecal Occult Blood Test (FOBT)/Fecal Immunochemical Test (FIT)  * Fecal DNA/Cologuard Test  * Flexible Sigmoidoscopy Age: 54-65 years old   Colonoscopy: every 10 years (may be performed more frequently if at higher risk)  OR  FOBT/FIT: every 1 year  OR  Cologuard: every 3 years  OR  Sigmoidoscopy: every 5 years  Screening may be recommended earlier than age 48 if at higher risk for colorectal cancer  Also, an individualized decision between you and your healthcare provider will decide whether screening between the ages of 74-80 would be appropriate  Colonoscopy: 04/06/2017  FOBT/FIT: Not on file  Cologuard: Not on file  Sigmoidoscopy: Not on file    Screening Current     Breast Cancer Screening Age: 36 years old  Frequency: every 1-2 years  Not required if history of left and right mastectomy Mammogram: 10/04/2021    Screening Current   Cervical Cancer Screening Between the ages of 21-29, pap smear recommended once every 3 years  Between the ages of 33-67, can perform pap smear with HPV co-testing every 5 years     Recommendations may differ for women with a history of total hysterectomy, cervical cancer, or abnormal pap smears in past  Pap Smear: 09/13/2021    Screening Not Indicated   Hepatitis C Screening Once for adults born between 1945 and 1965  More frequently in patients at high risk for Hepatitis C Hep C Antibody: 08/12/2019    Screening Current   Diabetes Screening 1-2 times per year if you're at risk for diabetes or have pre-diabetes Fasting glucose: No results in last 5 years   A1C: No results in last 5 years    Screening Current   Cholesterol Screening Once every 5 years if you don't have a lipid disorder  May order more often based on risk factors  Lipid panel: 10/27/2021    Screening Current     Other Preventive Screenings Covered by Medicare:  1  Abdominal Aortic Aneurysm (AAA) Screening: covered once if your at risk  You're considered to be at risk if you have a family history of AAA  2  Lung Cancer Screening: covers low dose CT scan once per year if you meet all of the following conditions: (1) Age 50-69; (2) No signs or symptoms of lung cancer; (3) Current smoker or have quit smoking within the last 15 years; (4) You have a tobacco smoking history of at least 30 pack years (packs per day multiplied by number of years you smoked); (5) You get a written order from a healthcare provider  3  Glaucoma Screening: covered annually if you're considered high risk: (1) You have diabetes OR (2) Family history of glaucoma OR (3)  aged 48 and older OR (3)  American aged 72 and older  3  Osteoporosis Screening: covered every 2 years if you meet one of the following conditions: (1) You're estrogen deficient and at risk for osteoporosis based off medical history and other findings; (2) Have a vertebral abnormality; (3) On glucocorticoid therapy for more than 3 months; (4) Have primary hyperparathyroidism; (5) On osteoporosis medications and need to assess response to drug therapy  · Last bone density test (DXA Scan): 11/05/2019   5  HIV Screening: covered annually if you're between the age of 15-65  Also covered annually if you are younger than 13 and older than 72 with risk factors for HIV infection   For pregnant patients, it is covered up to 3 times per pregnancy  Immunizations:  Immunization Recommendations   Influenza Vaccine Annual influenza vaccination during flu season is recommended for all persons aged >= 6 months who do not have contraindications   Pneumococcal Vaccine (Prevnar and Pneumovax)  * Prevnar = PCV13  * Pneumovax = PPSV23   Adults 25-60 years old: 1-3 doses may be recommended based on certain risk factors  Adults 72 years old: Prevnar (PCV13) vaccine recommended followed by Pneumovax (PPSV23) vaccine  If already received PPSV23 since turning 65, then PCV13 recommended at least one year after PPSV23 dose  Hepatitis B Vaccine 3 dose series if at intermediate or high risk (ex: diabetes, end stage renal disease, liver disease)   Tetanus (Td) Vaccine - COST NOT COVERED BY MEDICARE PART B Following completion of primary series, a booster dose should be given every 10 years to maintain immunity against tetanus  Td may also be given as tetanus wound prophylaxis  Tdap Vaccine - COST NOT COVERED BY MEDICARE PART B Recommended at least once for all adults  For pregnant patients, recommended with each pregnancy  Shingles Vaccine (Shingrix) - COST NOT COVERED BY MEDICARE PART B  2 shot series recommended in those aged 48 and above     Health Maintenance Due:      Topic Date Due    Colorectal Cancer Screening  04/06/2027    Hepatitis C Screening  Completed     Immunizations Due:      Topic Date Due    COVID-19 Vaccine (4 - Booster for [employer] series) 02/28/2022     Advance Directives   What are advance directives? Advance directives are legal documents that state your wishes and plans for medical care  These plans are made ahead of time in case you lose your ability to make decisions for yourself  Advance directives can apply to any medical decision, such as the treatments you want, and if you want to donate organs  What are the types of advance directives?   There are many types of advance directives, and each state has rules about how to use them  You may choose a combination of any of the following:  · Living will: This is a written record of the treatment you want  You can also choose which treatments you do not want, which to limit, and which to stop at a certain time  This includes surgery, medicine, IV fluid, and tube feedings  · Durable power of  for healthcare [facility]): This is a written record that states who you want to make healthcare choices for you when you are unable to make them for yourself  This person, called a proxy, is usually a family member or a friend  You may choose more than 1 proxy  · Do not resuscitate (DNR) order:  A DNR order is used in case your heart stops beating or you stop breathing  It is a request not to have certain forms of treatment, such as CPR  A DNR order may be included in other types of advance directives  · Medical directive: This covers the care that you want if you are in a coma, near death, or unable to make decisions for yourself  You can list the treatments you want for each condition  Treatment may include pain medicine, surgery, blood transfusions, dialysis, IV or tube feedings, and a ventilator (breathing machine)  · Values history: This document has questions about your views, beliefs, and how you feel and think about life  This information can help others choose the care that you would choose  Why are advance directives important? An advance directive helps you control your care  Although spoken wishes may be used, it is better to have your wishes written down  Spoken wishes can be misunderstood, or not followed  Treatments may be given even if you do not want them  An advance directive may make it easier for your family to make difficult choices about your care  Urinary Incontinence   Urinary incontinence (UI)  is when you lose control of your bladder  UI develops because your bladder cannot store or empty urine properly   The 3 most common types of UI are stress incontinence, urge incontinence, or both  Medicines:   · May be given to help strengthen your bladder control  Report any side effects of medication to your healthcare provider  Do pelvic muscle exercises often:  Your pelvic muscles help you stop urinating  Squeeze these muscles tight for 5 seconds, then relax for 5 seconds  Gradually work up to squeezing for 10 seconds  Do 3 sets of 15 repetitions a day, or as directed  This will help strengthen your pelvic muscles and improve bladder control  Train your bladder:  Go to the bathroom at set times, such as every 2 hours, even if you do not feel the urge to go  You can also try to hold your urine when you feel the urge to go  For example, hold your urine for 5 minutes when you feel the urge to go  As that becomes easier, hold your urine for 10 minutes  Self-care:   · Keep a UI record  Write down how often you leak urine and how much you leak  Make a note of what you were doing when you leaked urine  · Drink liquids as directed  You may need to limit the amount of liquid you drink to help control your urine leakage  Do not drink any liquid right before you go to bed  Limit or do not have drinks that contain caffeine or alcohol  · Prevent constipation  Eat a variety of high-fiber foods  Good examples are high-fiber cereals, beans, vegetables, and whole-grain breads  Walking is the best way to trigger your intestines to have a bowel movement  · Exercise regularly and maintain a healthy weight  Weight loss and exercise will decrease pressure on your bladder and help you control your leakage  · Use a catheter as directed  to help empty your bladder  A catheter is a tiny, plastic tube that is put into your bladder to drain your urine  · Go to behavior therapy as directed  Behavior therapy may be used to help you learn to control your urge to urinate      Weight Management   Why it is important to manage your weight:  Being overweight increases your risk of health conditions such as heart disease, high blood pressure, type 2 diabetes, and certain types of cancer  It can also increase your risk for osteoarthritis, sleep apnea, and other respiratory problems  Aim for a slow, steady weight loss  Even a small amount of weight loss can lower your risk of health problems  How to lose weight safely:  A safe and healthy way to lose weight is to eat fewer calories and get regular exercise  You can lose up about 1 pound a week by decreasing the number of calories you eat by 500 calories each day  Healthy meal plan for weight management:  A healthy meal plan includes a variety of foods, contains fewer calories, and helps you stay healthy  A healthy meal plan includes the following:  · Eat whole-grain foods more often  A healthy meal plan should contain fiber  Fiber is the part of grains, fruits, and vegetables that is not broken down by your body  Whole-grain foods are healthy and provide extra fiber in your diet  Some examples of whole-grain foods are whole-wheat breads and pastas, oatmeal, brown rice, and bulgur  · Eat a variety of vegetables every day  Include dark, leafy greens such as spinach, kale, orlin greens, and mustard greens  Eat yellow and orange vegetables such as carrots, sweet potatoes, and winter squash  · Eat a variety of fruits every day  Choose fresh or canned fruit (canned in its own juice or light syrup) instead of juice  Fruit juice has very little or no fiber  · Eat low-fat dairy foods  Drink fat-free (skim) milk or 1% milk  Eat fat-free yogurt and low-fat cottage cheese  Try low-fat cheeses such as mozzarella and other reduced-fat cheeses  · Choose meat and other protein foods that are low in fat  Choose beans or other legumes such as split peas or lentils  Choose fish, skinless poultry (chicken or turkey), or lean cuts of red meat (beef or pork)  Before you cook meat or poultry, cut off any visible fat  · Use less fat and oil    Try baking foods instead of frying them  Add less fat, such as margarine, sour cream, regular salad dressing and mayonnaise to foods  Eat fewer high-fat foods  Some examples of high-fat foods include french fries, doughnuts, ice cream, and cakes  · Eat fewer sweets  Limit foods and drinks that are high in sugar  This includes candy, cookies, regular soda, and sweetened drinks  Exercise:  Exercise at least 30 minutes per day on most days of the week  Some examples of exercise include walking, biking, dancing, and swimming  You can also fit in more physical activity by taking the stairs instead of the elevator or parking farther away from stores  Ask your healthcare provider about the best exercise plan for you  © Copyright [employer] 2018 Information is for End User's use only and may not be sold, redistributed or otherwise used for commercial purposes   All illustrations and images included in CareNotes® are the copyrighted property of [employer]  or [address] [place] [address]

## 2022-08-03 LAB
ALBUMIN SERPL-MCNC: 4.1 G/DL (ref 3.6–5.1)
ALBUMIN/GLOB SERPL: 1.6 (CALC) (ref 1–2.5)
ALP SERPL-CCNC: 105 U/L (ref 37–153)
ALT SERPL-CCNC: 13 U/L (ref 6–29)
AST SERPL-CCNC: 18 U/L (ref 10–35)
BASOPHILS # BLD AUTO: 52 CELLS/UL (ref 0–200)
BASOPHILS NFR BLD AUTO: 1 %
BILIRUB SERPL-MCNC: 0.6 MG/DL (ref 0.2–1.2)
BUN SERPL-MCNC: 17 MG/DL (ref 7–25)
BUN/CREAT SERPL: NORMAL (CALC) (ref 6–22)
CALCIUM SERPL-MCNC: 9.3 MG/DL (ref 8.6–10.4)
CHLORIDE SERPL-SCNC: 103 MMOL/L (ref 98–110)
CHOLEST SERPL-MCNC: 203 MG/DL
CHOLEST/HDLC SERPL: 3.3 (CALC)
CO2 SERPL-SCNC: 31 MMOL/L (ref 20–32)
CREAT SERPL-MCNC: 0.92 MG/DL (ref 0.6–1)
EOSINOPHIL # BLD AUTO: 400 CELLS/UL (ref 15–500)
EOSINOPHIL NFR BLD AUTO: 7.7 %
ERYTHROCYTE [DISTWIDTH] IN BLOOD BY AUTOMATED COUNT: 12.7 % (ref 11–15)
GFR/BSA.PRED SERPLBLD CYS-BASED-ARV: 65 ML/MIN/1.73M2
GLOBULIN SER CALC-MCNC: 2.6 G/DL (CALC) (ref 1.9–3.7)
GLUCOSE SERPL-MCNC: 87 MG/DL (ref 65–99)
HCT VFR BLD AUTO: 37.1 % (ref 35–45)
HDLC SERPL-MCNC: 61 MG/DL
HGB BLD-MCNC: 12.4 G/DL (ref 11.7–15.5)
LDLC SERPL CALC-MCNC: 122 MG/DL (CALC)
LYMPHOCYTES # BLD AUTO: 1414 CELLS/UL (ref 850–3900)
LYMPHOCYTES NFR BLD AUTO: 27.2 %
MCH RBC QN AUTO: 31.2 PG (ref 27–33)
MCHC RBC AUTO-ENTMCNC: 33.4 G/DL (ref 32–36)
MCV RBC AUTO: 93.2 FL (ref 80–100)
MONOCYTES # BLD AUTO: 426 CELLS/UL (ref 200–950)
MONOCYTES NFR BLD AUTO: 8.2 %
NEUTROPHILS # BLD AUTO: 2907 CELLS/UL (ref 1500–7800)
NEUTROPHILS NFR BLD AUTO: 55.9 %
NONHDLC SERPL-MCNC: 142 MG/DL (CALC)
PLATELET # BLD AUTO: 224 THOUSAND/UL (ref 140–400)
PMV BLD REES-ECKER: 12 FL (ref 7.5–12.5)
POTASSIUM SERPL-SCNC: 4.1 MMOL/L (ref 3.5–5.3)
PROT SERPL-MCNC: 6.7 G/DL (ref 6.1–8.1)
RBC # BLD AUTO: 3.98 MILLION/UL (ref 3.8–5.1)
SODIUM SERPL-SCNC: 139 MMOL/L (ref 135–146)
T4 FREE SERPL-MCNC: 1.3 NG/DL (ref 0.8–1.8)
TRIGL SERPL-MCNC: 92 MG/DL
TSH SERPL-ACNC: 5.57 MIU/L (ref 0.4–4.5)
WBC # BLD AUTO: 5.2 THOUSAND/UL (ref 3.8–10.8)

## 2022-12-06 ENCOUNTER — HOSPITAL ENCOUNTER (OUTPATIENT)
Dept: RADIOLOGY | Facility: MEDICAL CENTER | Age: 77
Discharge: HOME | End: 2022-12-06

## 2022-12-06 VITALS — WEIGHT: 153 LBS | HEIGHT: 61 IN | BODY MASS INDEX: 28.89 KG/M2

## 2022-12-06 DIAGNOSIS — Z78.0 ENCOUNTER FOR OSTEOPOROSIS SCREENING IN ASYMPTOMATIC POSTMENOPAUSAL PATIENT: ICD-10-CM

## 2022-12-06 DIAGNOSIS — Z12.31 ENCOUNTER FOR SCREENING MAMMOGRAM FOR MALIGNANT NEOPLASM OF BREAST: ICD-10-CM

## 2022-12-06 DIAGNOSIS — Z13.820 ENCOUNTER FOR OSTEOPOROSIS SCREENING IN ASYMPTOMATIC POSTMENOPAUSAL PATIENT: ICD-10-CM

## 2023-05-10 ENCOUNTER — OFFICE VISIT (OUTPATIENT)
Dept: CARDIOLOGY CLINIC | Facility: CLINIC | Age: 78
End: 2023-05-10

## 2023-05-10 VITALS
HEART RATE: 70 BPM | BODY MASS INDEX: 28.21 KG/M2 | DIASTOLIC BLOOD PRESSURE: 60 MMHG | WEIGHT: 149.4 LBS | SYSTOLIC BLOOD PRESSURE: 148 MMHG | HEIGHT: 61 IN

## 2023-05-10 DIAGNOSIS — R35.0 INCREASED URINARY FREQUENCY: ICD-10-CM

## 2023-05-10 DIAGNOSIS — R00.2 PALPITATIONS: Primary | ICD-10-CM

## 2023-05-10 DIAGNOSIS — R42 EPISODIC LIGHTHEADEDNESS: ICD-10-CM

## 2023-05-10 DIAGNOSIS — I10 HYPERTENSION, UNSPECIFIED TYPE: ICD-10-CM

## 2023-05-10 NOTE — PROGRESS NOTES
"EPS Progress Note - Kurt Maxwell 69 y o  female MRN: [id number]           ASSESSMENT:  1  Palpitations  POCT ECG      2  Hypertension, unspecified type        3  Episodic lightheadedness        4  Increased urinary frequency          And written prescription for Edarbi 80 mg take 1 daily and HCTZ 12 5 mg 1 p o  twice daily given the patient replace the valsartan HCTZ 3/20/2025      PLAN:  1  History of intermittent palpitations  Secondary to PACs she has had very limited episodes over the past year  2  Hypertension  Well-controlled but she continues to deal with urinary frequency  I am going to give her a prescription for Edarbi and a separate prescription for HCTZ     Last Lisa Britts has been prohibitively expensive but it is generic now so perhaps the Connye Corrigan will better control her hypertension she can use the HCTZ more sparingly    3  History presyncope no further presyncope really no episodes of lightheadedness        HPI:   Kurt Maxwell is a 69 y o  female who presents to the office today for a 3 year follow up  Patient reports occasional palpitations  Lasts less than a minute  No episodes of pre-syncope  Occasional head pressure or tunneling lasts a few seconds at the most   Leg swelling is ok  Had significant leg swelling on amlodipine  ROS:   Review of Systems   Constitutional: Negative  HENT: Negative  Eyes: Negative for blurred vision and double vision  Cardiovascular: Positive for palpitations  Negative for chest pain, dyspnea on exertion, near-syncope and syncope  Respiratory: Negative for cough, shortness of breath and wheezing  Endocrine: Negative  Hematologic/Lymphatic: Negative  Skin: Negative  Musculoskeletal: Negative  Gastrointestinal: Negative  Genitourinary: Negative  Neurological: Negative  Psychiatric/Behavioral: Negative  Allergic/Immunologic: Negative             Objective:     Vitals: Blood pressure 148/60, pulse 70, height 5' 1\" " (1 549 m), weight 67 8 kg (149 lb 6 4 oz), not currently breastfeeding , Body mass index is 28 23 kg/m²  ,        Physical Exam:    GEN: Marisabel Phillips appears well, alert and oriented x 3, pleasant and cooperative   HEENT: pupils equal, round, and reactive to light; extraocular muscles intact  NECK: supple, no carotid bruits   HEART: regular rhythm, normal S1 and S2, no murmurs, clicks, gallops or rubs   LUNGS: clear to auscultation bilaterally; no wheezes, rales, or rhonchi   ABDOMEN: normal bowel sounds, soft, no tenderness, no distention  EXTREMITIES: peripheral pulses normal; no clubbing, cyanosis, or edema  NEURO: no focal findings   SKIN: normal without suspicious lesions on exposed skin    Medications:      Current Outpatient Medications:   •  aspirin 81 MG tablet, Take 1 tablet by mouth daily, Disp: , Rfl:   •  Cholecalciferol (VITAMIN D3) 2000 units capsule, Take 1 tablet by mouth daily, Disp: , Rfl:   •  Magnesium 250 MG TABS, Take 250 tablets by mouth daily as needed, Disp: , Rfl:   •  metroNIDAZOLE (METROGEL) 0 75 % gel, Apply topically 2 (two) times a day, Disp: 45 g, Rfl: 5  •  Multiple Vitamin-Folic Acid TABS, Take 1 tablet by mouth daily, Disp: , Rfl:   •  Psyllium 28 3 % POWD, Take by mouth, Disp: , Rfl:   •  solifenacin (VESICARE) 5 mg tablet, Take 1 tablet (5 mg total) by mouth in the morning , Disp: 90 tablet, Rfl: 3  •  valsartan-hydrochlorothiazide (DIOVAN-HCT) 320-25 MG per tablet, Take 0 5 tablets by mouth in the morning and 0 5 tablets in the evening , Disp: 90 tablet, Rfl: 3  •  metroNIDAZOLE (METROGEL) 1 % gel, Apply topically, Disp: , Rfl:      Family History   Problem Relation Age of Onset   • Hypertension Mother    • Coronary artery disease Father    • Diverticulitis Father         Colon   • Stroke Father         Syndrome   • Venous thrombosis Father         of the deep Vessels of the distal lower extremity   • Diabetes Brother    • Lung cancer Brother 48   • Venous thrombosis Brother of the deep vessels of the distal Lower extremity   • Melanoma Brother    • Diabetes Paternal Grandfather    • Lung cancer Family         Adenocarcinoma of the Lung   • Other Family         Disorders of Blood and Blood-forming Organs, Skin Disorder   • Heart disease Family    • Colon cancer Paternal Aunt    • No Known Problems Daughter    • No Known Problems Maternal Grandmother    • No Known Problems Maternal Grandfather    • No Known Problems Paternal Grandmother      Social History     Socioeconomic History   • Marital status: /Civil Union     Spouse name: Not on file   • Number of children: Not on file   • Years of education: Not on file   • Highest education level: Not on file   Occupational History   • Not on file   Tobacco Use   • Smoking status: Never   • Smokeless tobacco: Never   Vaping Use   • Vaping Use: Never used   Substance and Sexual Activity   • Alcohol use: Yes     Comment: social drinker   • Drug use: No   • Sexual activity: Yes     Partners: Male     Birth control/protection: Post-menopausal   Other Topics Concern   • Not on file   Social History Narrative    Caffeine use    Exercise frequency      Social Determinants of Health     Financial Resource Strain: Not on file   Food Insecurity: Not on file   Transportation Needs: Not on file   Physical Activity: Not on file   Stress: Not on file   Social Connections: Not on file   Intimate Partner Violence: Not on file   Housing Stability: Not on file     Social History     Tobacco Use   Smoking Status Never   Smokeless Tobacco Never     Social History     Substance and Sexual Activity   Alcohol Use Yes    Comment: social drinker       Labs & Results:  Below is the patient's most recent value for Albumin, ALT, AST, BUN, Calcium, Chloride, Cholesterol, CO2, Creatinine, GFR, Glucose, HDL, Hematocrit, Hemoglobin, Hemoglobin A1C, LDL, Magnesium, Phosphorus, Platelets, Potassium, PSA, Sodium, Triglycerides, and WBC     Lab Results   Component Value Date    ALT 13 2022    AST 18 2022    BUN 17 2022    CALCIUM 9 3 2022     2022    CHOL 195 2017    CO2 31 2022    CREATININE 0 92 2022    HDL 61 2022    HCT 37 1 2022    HGB 12 4 2022    MG 2 1 2016     2022    K 4 1 2022     2017    TRIG 92 2022    WBC 5 2 2022     Note: for a comprehensive list of the patient's lab results, access the Results Review activity  Cardiac testing:   Results for orders placed during the hospital encounter of 16   Echo complete with contrast if indicated    [place]ative [address]  [facility] [place] [address] [facility]  [phone]    Transthoracic Echocardiogram  2D, M-mode, Doppler, and Color Doppler    Study date:  2016    Patient: Karley Biswas  MR number: [id number]  Account number: [de-identified]  : 1945  Age: 72 years  Gender: Female  Status: Outpatient  Location: Bedside  Height: 61 in  Weight: 159 lb  BP: 140/ 62 mmHg    Indications: Palpitations  Diagnoses: R00 2 - Palpitations    Sonographer:  ADOER Amor Res  Primary Physician:  Rhonda Quipse DO  Group:  [facility]  Interpreting Physician:  Renate Gallegos MD    SUMMARY    LEFT VENTRICLE:  Systolic function was normal by visual assessment  Ejection fraction was  estimated to be 70 %  There were no regional wall motion abnormalities  RIGHT VENTRICLE:  The size was normal   Systolic function was normal     MITRAL VALVE:  There was mild regurgitation  TRICUSPID VALVE:  There was mild to moderate regurgitation  PULMONIC VALVE:  There was trace regurgitation  IVC, HEPATIC VEINS:  Respirophasic changes were blunted (less than 50% variation)  HISTORY: PRIOR HISTORY: Risk factors: hypertension  PROCEDURE: The procedure was performed at the bedside  This was a routine  study   The transthoracic approach was used  The study included complete 2D  imaging, M-mode, complete spectral Doppler, and color Doppler  The heart rate  was 60 bpm, at the start of the study  Images were obtained from the  parasternal, apical, subcostal, and suprasternal notch acoustic windows  Echocardiographic views were limited due to poor acoustic window availability,  decreased penetration, and lung interference  This was a technically difficult  study  LEFT VENTRICLE: Size was normal  Systolic function was normal by visual  assessment  Ejection fraction was estimated to be 70 %  There were no regional  wall motion abnormalities  Wall thickness was normal  DOPPLER: Left ventricular  diastolic function parameters were normal     RIGHT VENTRICLE: The size was normal  Systolic function was normal  Wall  thickness was normal     LEFT ATRIUM: Size was normal     RIGHT ATRIUM: Size was normal     MITRAL VALVE: Valve structure was normal  There was normal leaflet separation  DOPPLER: The transmitral velocity was within the normal range  There was no  evidence for stenosis  There was mild regurgitation  AORTIC VALVE: The valve was trileaflet  Leaflets exhibited normal thickness and  normal cuspal separation  DOPPLER: Transaortic velocity was within the normal  range  There was no evidence for stenosis  There was no significant  regurgitation  TRICUSPID VALVE: The valve structure was normal  There was normal leaflet  separation  DOPPLER: The transtricuspid velocity was within the normal range  There was no evidence for stenosis  There was mild to moderate regurgitation  Estimated peak PA pressure was 25 mmHg  PULMONIC VALVE: Not well visualized  DOPPLER: The transpulmonic velocity was  within the normal range  There was trace regurgitation  PERICARDIUM: There was no pericardial effusion  The pericardium was normal in  appearance  AORTA: The root exhibited normal size      SYSTEMIC VEINS: IVC: The inferior vena cava was normal in size  Respirophasic  changes were blunted (less than 50% variation)  MEASUREMENT TABLES    OTHER ECHO MEASUREMENTS  (Reference normals)  Estimated CVP   8 mmHg   (--)    SYSTEM MEASUREMENT TABLES    2D mode  AV Diam: 3 5 cm  AoR Diam; Mean (2D): 3 5 cm  LA Diam (2D): 3 4 cm  LA Dimension; Mean (2D): 3 4 cm  LA/Ao (2D): 0 97  EDV (2D-Cubed): 41 8 cm3  EF (2D-Cubed): 77 5 %  ESV (2D-Cubed): 9 39 cm3  FS (2D-Cubed): 39 2 %  FS (2D-Teich): 39 2 %  IVS/LVPW (2D): 1 09  IVSd (2D): 0 9 cm  IVSd; Mean chosen (2D): 0 9 cm  LVIDd (2D): 3 47 cm  LVIDd; Mean (2D): 3 47 cm  LVIDs (2D): 2 11 cm  LVIDs; Mean (2D): 2 11 cm  LVPWd (2D): 0 83 cm  LVPWd; Mean (2D): 0 83 cm  Left Ventricular Ejection Fraction; Teichholz; 2D mode;: 70 7 %  Left Ventricular End Diastolic Volume; Teichholz; 2D mode;: 49 8 cm3  Left Ventricular End Systolic Volume; Teichholz; 2D mode;: 14 6 cm3  SV (2D-Cubed): 32 4 cm3  Stroke Volume; Teichholz; 2D mode;: 35 2 cm3  RVIDd (2D): 3 59 cm  RVIDd; Mean (2D): 3 59 cm    Apical four chamber  LV MOD Diam; Recent value; End Diastole (A4C): 1 85 cm  LV MOD Diam; Recent value; End Diastole (A4C): 4 14 cm  LV MOD Diam; Recent value; End Diastole (A4C): 3 84 cm  LV MOD Diam; Recent value; End Diastole (A4C): 4 24 cm  LV MOD Diam; Recent value; End Diastole (A4C): 4 26 cm  LV MOD Diam; Recent value; End Diastole (A4C): 4 21 cm  LV MOD Diam; Recent value; End Diastole (A4C): 4 03 cm  LV MOD Diam; Recent value; End Diastole (A4C): 1 5 cm  LV MOD Diam; Recent value; End Diastole (A4C): 2 24 cm  LV MOD Diam; Recent value; End Diastole (A4C): 2 71 cm  LV MOD Diam; Recent value; End Diastole (A4C): 3 03 cm  LV MOD Diam; Recent value; End Diastole (A4C): 3 27 cm  LV MOD Diam; Recent value; End Diastole (A4C): 3 52 cm  LV MOD Diam; Recent value; End Diastole (A4C): 3 74 cm  LV MOD Diam; Recent value; End Diastole (A4C): 3 89 cm  LV MOD Diam; Recent value; End Diastole (A4C): 3 96 cm  LV MOD Diam; Recent value;  End Diastole (A4C): 3 99 cm  LV MOD Diam; Recent value; End Diastole (A4C): 4 11 cm  LV MOD Diam; Recent value; End Diastole (A4C): 4 24 cm  LV MOD Diam; Recent value; End Diastole (A4C): 4 26 cm  LV MOD Diam; Recent value; End Systole (A4C): 1 65 cm  LV MOD Diam; Recent value; End Systole (A4C): 3 06 cm  LV MOD Diam; Recent value; End Systole (A4C): 3 06 cm  LV MOD Diam; Recent value; End Systole (A4C): 2 92 cm  LV MOD Diam; Recent value; End Systole (A4C): 2 72 cm  LV MOD Diam; Recent value; End Systole (A4C): 1 75 cm  LV MOD Diam; Recent value; End Systole (A4C): 2 02 cm  LV MOD Diam; Recent value; End Systole (A4C): 2 21 cm  LV MOD Diam; Recent value; End Systole (A4C): 2 36 cm  LV MOD Diam; Recent value; End Systole (A4C): 2 48 cm  LV MOD Diam; Recent value; End Systole (A4C): 2 55 cm  LV MOD Diam; Recent value; End Systole (A4C): 2 55 cm  LV MOD Diam; Recent value; End Systole (A4C): 2 6 cm  LV MOD Diam; Recent value; End Systole (A4C): 2 65 cm  LV MOD Diam; Recent value; End Systole (A4C): 0 83 cm  LV MOD Diam; Recent value; End Systole (A4C): 1 19 cm  LV MOD Diam; Recent value; End Systole (A4C): 1 51 cm  LV MOD Diam; Recent value; End Systole (A4C): 2 8 cm  LV MOD Diam; Recent value; End Systole (A4C): 3 01 cm  LV MOD Diam; Recent value; End Systole (A4C): 3 06 cm  LVEF MOD A4C: 68 8 %  Left Ventricle diastolic major axis; Most recent value chosen; Method of Disks,  Single Plane; 2D mode; Apical four chamber;: 7 88 cm Left Ventricle systolic  major axis; Most recent value chosen; Method of Disks, Single Plane; 2D mode; Apical four chamber;: 5 52 cm Left Ventricular Diastolic Area; Most recent  value chosen; Method of Disks, Single Plane; 2D mode; Apical four chamber;:  2810 mm2 Left Ventricular End Diastolic Volume; Most recent value chosen;  Method of Disks, Single Plane; 2D mode; Apical four chamber;: 82 2 cm3 Left  Ventricular End Systolic Volume;  Most recent value chosen; Method of Disks,  Single Plane; 2D mode; Apical four chamber;: 25 7 cm3 Left Ventricular Systolic  Area; Most recent value chosen; Method of Disks, Single Plane; 2D mode; Apical  four chamber;: 1300 mm2  SV MOD A4C: 56 6 cm3    Tissue Doppler Imaging  LV Peak Early Vo Tissue Zafar; Medial MA (TDI): 69 6 mm/s  Left Ventricular Peak Early Diastolic Tissue Velocity; Mean; Mean value chosen;  Medial Mitral Annulus; Tissue Doppler Imaging;: 69 6 mm/s    Unspecified Scan Mode  MV Peak Zafar/LV Peak Tissue Zafar E-Wave; Medial MA: 12 4  Dec Gilpin; Antegrade Flow: 5140 mm/s2  Dec Gilpin; Mean; Antegrade Flow: 5140 mm/s2  MV A Zafar: 762 mm/s  MV E Zafar: 863 mm/s  MV E/A Ratio: 1 1  MV Peak A Zafar: 762 mm/s  MV Peak E Zafar; Mean; Antegrade Flow: 863 mm/s  Peak Grad; Mean; Regurgitant Flow: 21 mm[Hg]  Vmax; Mean; Regurgitant Flow: 2300 mm/s  Vmax; Regurgitant Flow: 2390 mm/s  Vmax; Regurgitant Flow: 2200 mm/s    [employer] Accredited Echocardiography Laboratory    Prepared and electronically signed by    Eufemia Chapa MD  Signed [id number] 17:39:45       No results found for this or any previous visit  No results found for this or any previous visit  No results found for this or any previous visit

## 2023-05-15 DIAGNOSIS — I10 ESSENTIAL HYPERTENSION: ICD-10-CM

## 2023-05-15 DIAGNOSIS — N39.41 URGE INCONTINENCE OF URINE: ICD-10-CM

## 2023-05-16 RX ORDER — VALSARTAN AND HYDROCHLOROTHIAZIDE 320; 25 MG/1; MG/1
TABLET, FILM COATED ORAL
Qty: 90 TABLET | Refills: 1 | Status: SHIPPED | OUTPATIENT
Start: 2023-05-16

## 2023-05-16 RX ORDER — SOLIFENACIN SUCCINATE 5 MG/1
5 TABLET, FILM COATED ORAL DAILY
Qty: 90 TABLET | Refills: 1 | Status: SHIPPED | OUTPATIENT
Start: 2023-05-16 | End: 2023-06-15

## 2023-06-07 ENCOUNTER — OFFICE VISIT (OUTPATIENT)
Dept: FAMILY MEDICINE CLINIC | Facility: CLINIC | Age: 78
End: 2023-06-07
Payer: COMMERCIAL

## 2023-06-07 VITALS
RESPIRATION RATE: 14 BRPM | TEMPERATURE: 98.9 F | BODY MASS INDEX: 27.02 KG/M2 | OXYGEN SATURATION: 98 % | SYSTOLIC BLOOD PRESSURE: 142 MMHG | HEIGHT: 62 IN | DIASTOLIC BLOOD PRESSURE: 70 MMHG | WEIGHT: 146.8 LBS | HEART RATE: 73 BPM

## 2023-06-07 DIAGNOSIS — M85.852 OSTEOPENIA OF NECK OF LEFT FEMUR: ICD-10-CM

## 2023-06-07 DIAGNOSIS — N39.41 URGE INCONTINENCE OF URINE: ICD-10-CM

## 2023-06-07 DIAGNOSIS — Z13.6 SCREENING FOR CARDIOVASCULAR CONDITION: ICD-10-CM

## 2023-06-07 DIAGNOSIS — E78.5 MILD HYPERLIPIDEMIA: ICD-10-CM

## 2023-06-07 DIAGNOSIS — E03.8 SUBCLINICAL HYPOTHYROIDISM: ICD-10-CM

## 2023-06-07 DIAGNOSIS — Z12.31 ENCOUNTER FOR SCREENING MAMMOGRAM FOR MALIGNANT NEOPLASM OF BREAST: ICD-10-CM

## 2023-06-07 DIAGNOSIS — Z00.00 MEDICARE ANNUAL WELLNESS VISIT, SUBSEQUENT: Primary | ICD-10-CM

## 2023-06-07 DIAGNOSIS — L71.9 ROSACEA: ICD-10-CM

## 2023-06-07 DIAGNOSIS — I10 PRIMARY HYPERTENSION: ICD-10-CM

## 2023-06-07 PROBLEM — M72.2 PLANTAR FASCIITIS OF LEFT FOOT: Status: RESOLVED | Noted: 2022-05-24 | Resolved: 2023-06-07

## 2023-06-07 PROBLEM — R60.0 SWELLING OF RIGHT PAROTID GLAND: Status: RESOLVED | Noted: 2022-05-24 | Resolved: 2023-06-07

## 2023-06-07 PROCEDURE — G0439 PPPS, SUBSEQ VISIT: HCPCS | Performed by: FAMILY MEDICINE

## 2023-06-07 RX ORDER — SOLIFENACIN SUCCINATE 10 MG/1
10 TABLET, FILM COATED ORAL DAILY
Qty: 90 TABLET | Refills: 3 | Status: SHIPPED | OUTPATIENT
Start: 2023-06-07 | End: 2023-09-05

## 2023-06-07 RX ORDER — METRONIDAZOLE 7.5 MG/G
GEL TOPICAL 2 TIMES DAILY
Qty: 45 G | Refills: 5 | Status: SHIPPED | OUTPATIENT
Start: 2023-06-07

## 2023-06-07 NOTE — PATIENT INSTRUCTIONS
Medicare Preventive Visit Patient Instructions  Thank you for completing your Welcome to Medicare Visit or Medicare Annual Wellness Visit today  Your next wellness visit will be due in one year (6/7/2024)  The screening/preventive services that you may require over the next 5-10 years are detailed below  Some tests may not apply to you based off risk factors and/or age  Screening tests ordered at today's visit but not completed yet may show as past due  Also, please note that scanned in results may not display below  Preventive Screenings:  Service Recommendations Previous Testing/Comments   Colorectal Cancer Screening  * Colonoscopy    * Fecal Occult Blood Test (FOBT)/Fecal Immunochemical Test (FIT)  * Fecal DNA/Cologuard Test  * Flexible Sigmoidoscopy Age: 39-70 years old   Colonoscopy: every 10 years (may be performed more frequently if at higher risk)  OR  FOBT/FIT: every 1 year  OR  Cologuard: every 3 years  OR  Sigmoidoscopy: every 5 years  Screening may be recommended earlier than age 39 if at higher risk for colorectal cancer  Also, an individualized decision between you and your healthcare provider will decide whether screening between the ages of 74-80 would be appropriate  Colonoscopy: 04/06/2017  FOBT/FIT: Not on file  Cologuard: Not on file  Sigmoidoscopy: Not on file          Breast Cancer Screening Age: 36 years old  Frequency: every 1-2 years  Not required if history of left and right mastectomy Mammogram: 12/06/2022    Screening Current   Cervical Cancer Screening Between the ages of 21-29, pap smear recommended once every 3 years  Between the ages of 33-67, can perform pap smear with HPV co-testing every 5 years     Recommendations may differ for women with a history of total hysterectomy, cervical cancer, or abnormal pap smears in past  Pap Smear: 09/13/2021    Screening Not Indicated   Hepatitis C Screening Once for adults born between 1945 and 1965  More frequently in patients at high risk for Hepatitis C Hep C Antibody: 08/12/2019    Screening Current   Diabetes Screening 1-2 times per year if you're at risk for diabetes or have pre-diabetes Fasting glucose: No results in last 5 years (No results in last 5 years)  A1C: No results in last 5 years (No results in last 5 years)  Screening Current   Cholesterol Screening Once every 5 years if you don't have a lipid disorder  May order more often based on risk factors  Lipid panel: 08/02/2022    Screening Current     Other Preventive Screenings Covered by Medicare:  1  Abdominal Aortic Aneurysm (AAA) Screening: covered once if your at risk  You're considered to be at risk if you have a family history of AAA  2  Lung Cancer Screening: covers low dose CT scan once per year if you meet all of the following conditions: (1) Age 50-69; (2) No signs or symptoms of lung cancer; (3) Current smoker or have quit smoking within the last 15 years; (4) You have a tobacco smoking history of at least 20 pack years (packs per day multiplied by number of years you smoked); (5) You get a written order from a healthcare provider  3  Glaucoma Screening: covered annually if you're considered high risk: (1) You have diabetes OR (2) Family history of glaucoma OR (3)  aged 48 and older OR (3)  American aged 72 and older  3  Osteoporosis Screening: covered every 2 years if you meet one of the following conditions: (1) You're estrogen deficient and at risk for osteoporosis based off medical history and other findings; (2) Have a vertebral abnormality; (3) On glucocorticoid therapy for more than 3 months; (4) Have primary hyperparathyroidism; (5) On osteoporosis medications and need to assess response to drug therapy  · Last bone density test (DXA Scan): 12/06/2022   5  HIV Screening: covered annually if you're between the age of 15-65  Also covered annually if you are younger than 13 and older than 72 with risk factors for HIV infection   For pregnant patients, it is covered up to 3 times per pregnancy  Immunizations:  Immunization Recommendations   Influenza Vaccine Annual influenza vaccination during flu season is recommended for all persons aged >= 6 months who do not have contraindications   Pneumococcal Vaccine   * Pneumococcal conjugate vaccine = PCV13 (Prevnar 13), PCV15 (Vaxneuvance), PCV20 (Prevnar 20)  * Pneumococcal polysaccharide vaccine = PPSV23 (Pneumovax) Adults 25-60 years old: 1-3 doses may be recommended based on certain risk factors  Adults 72 years old: 1-2 doses may be recommended based off what pneumonia vaccine you previously received   Hepatitis B Vaccine 3 dose series if at intermediate or high risk (ex: diabetes, end stage renal disease, liver disease)   Tetanus (Td) Vaccine - COST NOT COVERED BY MEDICARE PART B Following completion of primary series, a booster dose should be given every 10 years to maintain immunity against tetanus  Td may also be given as tetanus wound prophylaxis  Tdap Vaccine - COST NOT COVERED BY MEDICARE PART B Recommended at least once for all adults  For pregnant patients, recommended with each pregnancy  Shingles Vaccine (Shingrix) - COST NOT COVERED BY MEDICARE PART B  2 shot series recommended in those aged 48 and above     Health Maintenance Due:      Topic Date Due   • Hepatitis C Screening  Completed   • Colorectal Cancer Screening  Discontinued     Immunizations Due:      Topic Date Due   • COVID-19 Vaccine (4 - Moderna series) 12/23/2021   • Influenza Vaccine (Season Ended) 09/01/2023     Advance Directives   What are advance directives? Advance directives are legal documents that state your wishes and plans for medical care  These plans are made ahead of time in case you lose your ability to make decisions for yourself  Advance directives can apply to any medical decision, such as the treatments you want, and if you want to donate organs  What are the types of advance directives?   There are many types of advance directives, and each state has rules about how to use them  You may choose a combination of any of the following:  · Living will: This is a written record of the treatment you want  You can also choose which treatments you do not want, which to limit, and which to stop at a certain time  This includes surgery, medicine, IV fluid, and tube feedings  · Durable power of  for healthcare [facility]): This is a written record that states who you want to make healthcare choices for you when you are unable to make them for yourself  This person, called a proxy, is usually a family member or a friend  You may choose more than 1 proxy  · Do not resuscitate (DNR) order:  A DNR order is used in case your heart stops beating or you stop breathing  It is a request not to have certain forms of treatment, such as CPR  A DNR order may be included in other types of advance directives  · Medical directive: This covers the care that you want if you are in a coma, near death, or unable to make decisions for yourself  You can list the treatments you want for each condition  Treatment may include pain medicine, surgery, blood transfusions, dialysis, IV or tube feedings, and a ventilator (breathing machine)  · Values history: This document has questions about your views, beliefs, and how you feel and think about life  This information can help others choose the care that you would choose  Why are advance directives important? An advance directive helps you control your care  Although spoken wishes may be used, it is better to have your wishes written down  Spoken wishes can be misunderstood, or not followed  Treatments may be given even if you do not want them  An advance directive may make it easier for your family to make difficult choices about your care  Urinary Incontinence   Urinary incontinence (UI)  is when you lose control of your bladder   UI develops because your bladder cannot store or empty urine properly  The 3 most common types of UI are stress incontinence, urge incontinence, or both  Medicines:   · May be given to help strengthen your bladder control  Report any side effects of medication to your healthcare provider  Do pelvic muscle exercises often:  Your pelvic muscles help you stop urinating  Squeeze these muscles tight for 5 seconds, then relax for 5 seconds  Gradually work up to squeezing for 10 seconds  Do 3 sets of 15 repetitions a day, or as directed  This will help strengthen your pelvic muscles and improve bladder control  Train your bladder:  Go to the bathroom at set times, such as every 2 hours, even if you do not feel the urge to go  You can also try to hold your urine when you feel the urge to go  For example, hold your urine for 5 minutes when you feel the urge to go  As that becomes easier, hold your urine for 10 minutes  Self-care:   · Keep a UI record  Write down how often you leak urine and how much you leak  Make a note of what you were doing when you leaked urine  · Drink liquids as directed  You may need to limit the amount of liquid you drink to help control your urine leakage  Do not drink any liquid right before you go to bed  Limit or do not have drinks that contain caffeine or alcohol  · Prevent constipation  Eat a variety of high-fiber foods  Good examples are high-fiber cereals, beans, vegetables, and whole-grain breads  Walking is the best way to trigger your intestines to have a bowel movement  · Exercise regularly and maintain a healthy weight  Weight loss and exercise will decrease pressure on your bladder and help you control your leakage  · Use a catheter as directed  to help empty your bladder  A catheter is a tiny, plastic tube that is put into your bladder to drain your urine  · Go to behavior therapy as directed  Behavior therapy may be used to help you learn to control your urge to urinate      Weight Management   Why it is important to manage your weight:  Being overweight increases your risk of health conditions such as heart disease, high blood pressure, type 2 diabetes, and certain types of cancer  It can also increase your risk for osteoarthritis, sleep apnea, and other respiratory problems  Aim for a slow, steady weight loss  Even a small amount of weight loss can lower your risk of health problems  How to lose weight safely:  A safe and healthy way to lose weight is to eat fewer calories and get regular exercise  You can lose up about 1 pound a week by decreasing the number of calories you eat by 500 calories each day  Healthy meal plan for weight management:  A healthy meal plan includes a variety of foods, contains fewer calories, and helps you stay healthy  A healthy meal plan includes the following:  · Eat whole-grain foods more often  A healthy meal plan should contain fiber  Fiber is the part of grains, fruits, and vegetables that is not broken down by your body  Whole-grain foods are healthy and provide extra fiber in your diet  Some examples of whole-grain foods are whole-wheat breads and pastas, oatmeal, brown rice, and bulgur  · Eat a variety of vegetables every day  Include dark, leafy greens such as spinach, kale, orlin greens, and mustard greens  Eat yellow and orange vegetables such as carrots, sweet potatoes, and winter squash  · Eat a variety of fruits every day  Choose fresh or canned fruit (canned in its own juice or light syrup) instead of juice  Fruit juice has very little or no fiber  · Eat low-fat dairy foods  Drink fat-free (skim) milk or 1% milk  Eat fat-free yogurt and low-fat cottage cheese  Try low-fat cheeses such as mozzarella and other reduced-fat cheeses  · Choose meat and other protein foods that are low in fat  Choose beans or other legumes such as split peas or lentils  Choose fish, skinless poultry (chicken or turkey), or lean cuts of red meat (beef or pork)   Before you cook meat or poultry, cut off any visible fat  · Use less fat and oil  Try baking foods instead of frying them  Add less fat, such as margarine, sour cream, regular salad dressing and mayonnaise to foods  Eat fewer high-fat foods  Some examples of high-fat foods include french fries, doughnuts, ice cream, and cakes  · Eat fewer sweets  Limit foods and drinks that are high in sugar  This includes candy, cookies, regular soda, and sweetened drinks  Exercise:  Exercise at least 30 minutes per day on most days of the week  Some examples of exercise include walking, biking, dancing, and swimming  You can also fit in more physical activity by taking the stairs instead of the elevator or parking farther away from stores  Ask your healthcare provider about the best exercise plan for you  © Copyright [employer] 2018 Information is for End User's use only and may not be sold, redistributed or otherwise used for commercial purposes   All illustrations and images included in CareNotes® are the copyrighted property of [employer]  or [address] [place] [address]

## 2023-06-07 NOTE — PROGRESS NOTES
Assessment and Plan:     Problem List Items Addressed This Visit        Endocrine    Subclinical hypothyroidism    Relevant Orders    TSH, 3rd generation with Free T4 reflex       Cardiovascular and Mediastinum    Hypertension    Relevant Orders    CBC    Comprehensive metabolic panel       Musculoskeletal and Integument    Osteopenia     Repeat dex 2024         Rosacea    Relevant Medications    metroNIDAZOLE (METROGEL) 0 75 % gel       Other    Medicare annual wellness visit, subsequent - Primary     Vas screening ordered  Mammogram ordered  Lab ordered         Urge incontinence of urine     Increased vesicare 10mg         Relevant Medications    solifenacin (VESICARE) 10 MG tablet    Mild hyperlipidemia    Relevant Orders    Lipid Panel with Direct LDL reflex   Other Visit Diagnoses     Encounter for screening mammogram for malignant neoplasm of breast        Relevant Orders    Mammo screening bilateral w 3d & cad    Screening for cardiovascular condition        Relevant Orders    VAS screening        BMI Counseling: Body mass index is 26 98 kg/m²  The BMI is above normal  Nutrition recommendations include encouraging healthy choices of fruits and vegetables  Rationale for BMI follow-up plan is due to patient being overweight or obese  Depression Screening and Follow-up Plan: Patient was screened for depression during today's encounter  They screened negative with a PHQ-2 score of 0  Preventive health issues were discussed with patient, and age appropriate screening tests were ordered as noted in patient's After Visit Summary  Personalized health advice and appropriate referrals for health education or preventive services given if needed, as noted in patient's After Visit Summary       History of Present Illness:     Patient presents for a Medicare Wellness Visit    Here for awv  vesicare seems to be working well  Would like to try higher dose     Patient Care Team:  Cliff Bae DO as PCP - General  MD Fly Carrillo DO     Review of Systems:     Review of Systems   Constitutional: Negative  HENT: Negative  Eyes: Negative  Respiratory: Negative  Cardiovascular: Negative  Gastrointestinal: Negative  Endocrine: Negative  Genitourinary: Negative  Musculoskeletal: Negative  Allergic/Immunologic: Negative  Neurological: Negative  Hematological: Negative  Psychiatric/Behavioral: Negative           Problem List:     Patient Active Problem List   Diagnosis   • History of basal cell carcinoma (BCC) of skin   • Diverticulosis   • Hypertension   • Osteopenia   • Palpitations   • Rosacea   • Subclinical hypothyroidism   • Vitamin D deficiency   • Medicare annual wellness visit, subsequent   • Episodic lightheadedness   • Increased urinary frequency   • Urge incontinence of urine   • GERD without esophagitis   • Mild hyperlipidemia      Past Medical and Surgical History:     Past Medical History:   Diagnosis Date   • Basal cell carcinoma 1991   • Osteoporosis    • Rosacea      Past Surgical History:   Procedure Laterality Date   • ADENOIDECTOMY     • BREAST CYST EXCISION Left 1989   • BREAST CYST EXCISION Left 1989   • BREAST CYST EXCISION Right 1980   • HEMORRHOID SURGERY     • INCISIONAL BREAST BIOPSY Left    • OTHER SURGICAL HISTORY      Arthrocentesis Aspiration of Ganglion Cyst   • TONSILLECTOMY     • UTERINE FIBROID EMBOLIZATION        Family History:     Family History   Problem Relation Age of Onset   • Hypertension Mother    • Coronary artery disease Father    • Diverticulitis Father         Colon   • Stroke Father         Syndrome   • Venous thrombosis Father         of the deep Vessels of the distal lower extremity   • Diabetes Brother    • Lung cancer Brother 48   • Venous thrombosis Brother         of the deep vessels of the distal Lower extremity   • Melanoma Brother    • Diabetes Paternal Grandfather    • Lung cancer Family         Adenocarcinoma of the Lung   • Other Family         Disorders of Blood and Blood-forming Organs, Skin Disorder   • Heart disease Family    • Colon cancer Paternal Aunt    • No Known Problems Daughter    • No Known Problems Maternal Grandmother    • No Known Problems Maternal Grandfather    • No Known Problems Paternal Grandmother       Social History:     Social History     Socioeconomic History   • Marital status: /Civil Union     Spouse name: None   • Number of children: None   • Years of education: None   • Highest education level: None   Occupational History   • None   Tobacco Use   • Smoking status: Never   • Smokeless tobacco: Never   Vaping Use   • Vaping Use: Never used   Substance and Sexual Activity   • Alcohol use: Yes     Comment: social drinker   • Drug use: No   • Sexual activity: Yes     Partners: Male     Birth control/protection: Post-menopausal   Other Topics Concern   • None   Social History Narrative    Caffeine use    Exercise frequency      Social Determinants of Health     Financial Resource Strain: Unknown (6/1/2023)    Overall Financial Resource Strain (CARDIA)    • Difficulty of Paying Living Expenses: Patient refused   Food Insecurity: Not on file   Transportation Needs: No Transportation Needs (6/1/2023)    PRAPARE - Transportation    • Lack of Transportation (Medical): No    • Lack of Transportation (Non-Medical):  No   Physical Activity: Not on file   Stress: Not on file   Social Connections: Not on file   Intimate Partner Violence: Not on file   Housing Stability: Not on file      Medications and Allergies:     Current Outpatient Medications   Medication Sig Dispense Refill   • aspirin 81 MG tablet Take 1 tablet by mouth daily     • Cholecalciferol (VITAMIN D3) 2000 units capsule Take 1 tablet by mouth daily     • Magnesium 250 MG TABS Take 250 tablets by mouth daily as needed     • metroNIDAZOLE (METROGEL) 0 75 % gel Apply topically 2 (two) times a day 45 g 5   • Multiple Vitamin-Folic Acid TABS Take 1 tablet by mouth daily     • Psyllium 28 3 % POWD Take by mouth     • solifenacin (VESICARE) 10 MG tablet Take 1 tablet (10 mg total) by mouth daily 90 tablet 3   • valsartan-hydrochlorothiazide (DIOVAN-HCT) 320-25 MG per tablet TAKE 1/2 TABLETS BY MOUTH IN THE MORNING AND 1/2 TABLETS IN THE EVENING  90 tablet 1     No current facility-administered medications for this visit  Allergies   Allergen Reactions   • Tetanus Antitoxin Hives   • Penicillins Rash      Immunizations:     Immunization History   Administered Date(s) Administered   • COVID-19 MODERNA VACC 0 25 ML IM BOOSTER 10/28/2021   • COVID-19 MODERNA VACC 0 5 ML IM 01/23/2021, 02/18/2021   • INFLUENZA 10/18/2019   • Influenza Quadrivalent Preservative Free 3 years and older IM 09/30/2016   • Influenza Split High Dose Preservative Free IM 09/25/2015   • Influenza, high dose seasonal 0 7 mL 10/02/2020   • Pneumococcal Conjugate 13-Valent 06/09/2015   • Pneumococcal Polysaccharide PPV23 12/02/2011   • Zoster 12/10/2012   • Zoster Vaccine Recombinant 05/05/2019, 08/19/2019      Health Maintenance:         Topic Date Due   • Hepatitis C Screening  Completed   • Colorectal Cancer Screening  Discontinued         Topic Date Due   • COVID-19 Vaccine (4 - Moderna series) 12/23/2021   • Influenza Vaccine (Season Ended) 09/01/2023      Medicare Screening Tests and Risk Assessments:     Tran Leary is here for her Subsequent Wellness visit  Health Risk Assessment:   Patient rates overall health as very good  Patient feels that their physical health rating is same  Patient is very satisfied with their life  Eyesight was rated as same  Hearing was rated as same  Patient feels that their emotional and mental health rating is same  Patients states they are never, rarely angry  Patient states they are never, rarely unusually tired/fatigued  Pain experienced in the last 7 days has been none   Patient states that she has experienced no weight loss or gain in last 6 months  Depression Screening:   PHQ-2 Score: 0      Fall Risk Screening: In the past year, patient has experienced: no history of falling in past year      Urinary Incontinence Screening:   Patient has leaked urine accidently in the last six months  Home Safety:  Patient does not have trouble with stairs inside or outside of their home  Patient has working smoke alarms and has no working carbon monoxide detector  Home safety hazards include: none  Nutrition:   Current diet is Low Cholesterol, Low Saturated Fat and Limited junk food  Medications:   Patient is currently taking over-the-counter supplements  OTC medications include: Multivitamin and vitamin D  Patient is able to manage medications  Activities of Daily Living (ADLs)/Instrumental Activities of Daily Living (IADLs):   Walk and transfer into and out of bed and chair?: Yes  Dress and groom yourself?: Yes    Bathe or shower yourself?: Yes    Feed yourself? Yes  Do your laundry/housekeeping?: Yes  Manage your money, pay your bills and track your expenses?: Yes  Make your own meals?: Yes    Do your own shopping?: Yes    Previous Hospitalizations:   Any hospitalizations or ED visits within the last 12 months?: No      Advance Care Planning:   Living will: Yes    Durable POA for healthcare:  Yes    Advanced directive: Yes      Cognitive Screening:   Provider or family/friend/caregiver concerned regarding cognition?: No    PREVENTIVE SCREENINGS      Cardiovascular Screening:    General: Screening Current      Diabetes Screening:     General: Screening Current      Breast Cancer Screening:     General: Screening Current      Cervical Cancer Screening:    General: Screening Not Indicated      Lung Cancer Screening:     General: Screening Not Indicated      Hepatitis C Screening:    General: Screening Current    Screening, Brief Intervention, and Referral to Treatment (SBIRT)    Screening  Typical number of drinks in a day: 0  Typical number of "drinks in a week: 0  Interpretation: Low risk drinking behavior  AUDIT-C Screenin) How often did you have a drink containing alcohol in the past year? monthly or less  2) How many drinks did you have on a typical day when you were drinking in the past year? 0  3) How often did you have 6 or more drinks on one occasion in the past year? never    AUDIT-C Score: 1  Interpretation: Score 0-2 (female): Negative screen for alcohol misuse    Single Item Drug Screening:  How often have you used an illegal drug (including marijuana) or a prescription medication for non-medical reasons in the past year? never    Single Item Drug Screen Score: 0  Interpretation: Negative screen for possible drug use disorder    No results found  Physical Exam:     /70 (BP Location: Left arm, Patient Position: Sitting, Cuff Size: Standard)   Pulse 73   Temp 98 9 °F (37 2 °C) (Tympanic)   Resp 14   Ht 5' 1 85\" (1 571 m)   Wt 66 6 kg (146 lb 12 8 oz)   LMP  (LMP Unknown)   SpO2 98%   BMI 26 98 kg/m²     Physical Exam  Vitals and nursing note reviewed  Constitutional:       Appearance: Normal appearance  She is well-developed  HENT:      Head: Normocephalic and atraumatic  Right Ear: External ear normal       Left Ear: External ear normal       Nose: Nose normal    Eyes:      Extraocular Movements: Extraocular movements intact  Conjunctiva/sclera: Conjunctivae normal       Pupils: Pupils are equal, round, and reactive to light  Cardiovascular:      Rate and Rhythm: Normal rate and regular rhythm  Heart sounds: Normal heart sounds  Pulmonary:      Effort: Pulmonary effort is normal       Breath sounds: Normal breath sounds  Abdominal:      General: Abdomen is flat  Bowel sounds are normal       Palpations: Abdomen is soft  Musculoskeletal:         General: Normal range of motion  Cervical back: Normal range of motion and neck supple  Skin:     General: Skin is warm and dry        Capillary " Refill: Capillary refill takes less than 2 seconds  Neurological:      General: No focal deficit present  Mental Status: She is alert and oriented to person, place, and time  Psychiatric:         Mood and Affect: Mood normal          Behavior: Behavior normal          Thought Content:  Thought content normal          Judgment: Judgment normal           Media DO Christi

## 2023-07-21 ENCOUNTER — TELEPHONE (OUTPATIENT)
Dept: DERMATOLOGY | Facility: CLINIC | Age: 78
End: 2023-07-21

## 2023-07-21 NOTE — TELEPHONE ENCOUNTER
Rec'd call from patient requesting new consultation for  :  SEVERAL SPOTS OF CONCERN    Wait list process was explained and understanding was verbalized. Created wait list for patient. (Patient indicates that she'd like to be seen sooner, rather than later. She states that she did have an appointment scheduled but had to cancel due to pandemic. I told patient that I completely understood and would do what I can, but also - her original appt was 3-1/2 years ago.)    Patient is aware to check MyChart notifications for sooner appt.

## 2023-08-10 ENCOUNTER — TELEPHONE (OUTPATIENT)
Dept: DERMATOLOGY | Facility: CLINIC | Age: 78
End: 2023-08-10

## 2023-08-10 ENCOUNTER — OFFICE VISIT (OUTPATIENT)
Dept: DERMATOLOGY | Facility: CLINIC | Age: 78
End: 2023-08-10
Payer: COMMERCIAL

## 2023-08-10 VITALS — HEIGHT: 62 IN | TEMPERATURE: 97.4 F | WEIGHT: 145.5 LBS | BODY MASS INDEX: 26.78 KG/M2

## 2023-08-10 DIAGNOSIS — L57.0 KERATOSIS, ACTINIC: ICD-10-CM

## 2023-08-10 DIAGNOSIS — L82.1 SEBORRHEIC KERATOSIS: ICD-10-CM

## 2023-08-10 DIAGNOSIS — D18.01 CHERRY ANGIOMA: ICD-10-CM

## 2023-08-10 DIAGNOSIS — L81.4 LENTIGO: ICD-10-CM

## 2023-08-10 DIAGNOSIS — Z85.828 HISTORY OF BASAL CELL CARCINOMA: Primary | ICD-10-CM

## 2023-08-10 DIAGNOSIS — L21.9 SEBORRHEIC DERMATITIS: ICD-10-CM

## 2023-08-10 DIAGNOSIS — L85.3 XEROSIS OF SKIN: ICD-10-CM

## 2023-08-10 DIAGNOSIS — D22.9 MULTIPLE MELANOCYTIC NEVI: ICD-10-CM

## 2023-08-10 PROCEDURE — 17000 DESTRUCT PREMALG LESION: CPT | Performed by: DERMATOLOGY

## 2023-08-10 PROCEDURE — 99204 OFFICE O/P NEW MOD 45 MIN: CPT | Performed by: DERMATOLOGY

## 2023-08-10 RX ORDER — KETOCONAZOLE 20 MG/G
CREAM TOPICAL DAILY
Qty: 60 G | Refills: 2 | Status: SHIPPED | OUTPATIENT
Start: 2023-08-10

## 2023-08-10 NOTE — TELEPHONE ENCOUNTER
Received call from patient stating that she was seen at 2:00 PM today and medication as to be sent to pharmacy and they haven't yet received it. She wanted to make sure that it was sent to correct pharmacy. Informed patient that [facility] on [address] confirmed receipt of script at 3:02 pm.      She thanked me for my time.

## 2023-08-10 NOTE — PROGRESS NOTES
[facility] Note     Patient Name: Kelly Bashir  Encounter Date: 8/10/2023     Have you been cared for by a [facility] Dermatologist in the last 3 years and, if so, which description applies to you? NO. I am considered a "new" patient and must complete all patient intake questions. I am FEMALE/of child-bearing potential.    REVIEW OF SYSTEMS:  Have you recently had or currently have any of the following? · Recent fever or chills? No  · Any non-healing wound? No  · Are you pregnant or planning to become pregnant? No  · Are you currently or planning to be nursing or breast feeding? No   PAST MEDICAL HISTORY:  Have you personally ever had or currently have any of the following? If "YES," then please provide more detail. · Skin cancer (such as Melanoma, Basal Cell Carcinoma, Squamous Cell Carcinoma? YES, [address] [place] [address] 2001  · Tuberculosis, HIV/AIDS, Hepatitis B or C: No  · Systemic Immunosuppression such as Diabetes, Biologic or Immunotherapy, Chemotherapy, Organ Transplantation, Bone Marrow Transplantation No  · Radiation Treatment No   FAMILY HISTORY:  Any "first degree relatives" (parent, brother, sister, or child) with the following? • Skin Cancer, Pancreatic or Other Cancer? YES, Brother: melanoma   PATIENT EXPERIENCE:    • Do you want the Dermatologist to perform a COMPLETE skin exam today including a clinical examination under the "bra and underwear" areas? Yes  • If necessary, do we have your permission to call and leave a detailed message on your Preferred Phone number that includes your specific medical information?   Yes      Allergies   Allergen Reactions   • Tetanus Antitoxin Hives   • Penicillins Rash      Current Outpatient Medications:   •  aspirin 81 MG tablet, Take 1 tablet by mouth daily, Disp: , Rfl:   •  Cholecalciferol (VITAMIN D3) 2000 units capsule, Take 1 tablet by mouth daily, Disp: , Rfl:   •  Magnesium 250 MG TABS, Take 250 tablets by mouth daily as needed, Disp: , Rfl: •  metroNIDAZOLE (METROGEL) 0.75 % gel, Apply topically 2 (two) times a day, Disp: 45 g, Rfl: 5  •  Multiple Vitamin-Folic Acid TABS, Take 1 tablet by mouth daily, Disp: , Rfl:   •  Psyllium 28.3 % POWD, Take by mouth, Disp: , Rfl:   •  solifenacin (VESICARE) 10 MG tablet, Take 1 tablet (10 mg total) by mouth daily, Disp: 90 tablet, Rfl: 3  •  valsartan-hydrochlorothiazide (DIOVAN-HCT) 320-25 MG per tablet, TAKE 1/2 TABLETS BY MOUTH IN THE MORNING AND 1/2 TABLETS IN THE EVENING., Disp: 90 tablet, Rfl: 1          • Whom besides the patient is providing clinical information about today's encounter?   o NO ADDITIONAL HISTORIAN (patient alone provided history)    Physical Exam and Assessment/Plan by Diagnosis:    MELANOCYTIC NEVI ("Moles")    Physical Exam:  • Anatomic Location Affected:   Mostly on sun-exposed areas of the trunk and extremities  • Morphological Description:  Scattered, 1-4mm round to ovoid, symmetrical-appearing, even bordered, skin colored to dark brown macules/papules, mostly in sun-exposed areas  • Pertinent Positives:  • Pertinent Negatives: Additional History of Present Condition:      Assessment and Plan:  Based on a thorough discussion of this condition and the management approach to it (including a comprehensive discussion of the known risks, side effects and potential benefits of treatment), the patient (family) agrees to implement the following specific plan:  • When outside we recommend using a wide brim hat, sunglasses, long sleeve and pants, sunscreen with SPF 65+ with reapplication every 2 hours, or SPF specific clothing   • Benign, reassured  • Annual skin check     Melanocytic Nevi  Melanocytic nevi ("moles") are tan or brown, raised or flat areas of the skin which have an increased number of melanocytes. Melanocytes are the cells in our body which make pigment and account for skin color.     Some moles are present at birth (I.e., "congenital nevi"), while others come up later in life (i.e., "acquired nevi"). The sun can stimulate the body to make more moles. Sunburns are not the only thing that triggers more moles. Chronic sun exposure can do it too. Clinically distinguishing a healthy mole from melanoma may be difficult, even for experienced dermatologists. The "ABCDE's" of moles have been suggested as a means of helping to alert a person to a suspicious mole and the possible increased risk of melanoma. The suggestions for raising alert are as follows:    Asymmetry: Healthy moles tend to be symmetric, while melanomas are often asymmetric. Asymmetry means if you draw a line through the mole, the two halves do not match in color, size, shape, or surface texture. Asymmetry can be a result of rapid enlargement of a mole, the development of a raised area on a previously flat lesion, scaling, ulceration, bleeding or scabbing within the mole. Any mole that starts to demonstrate "asymmetry" should be examined promptly by a board certified dermatologist.     Border: Healthy moles tend to have discrete, even borders. The border of a melanoma often blends into the normal skin and does not sharply delineate the mole from normal skin. Any mole that starts to demonstrate "uneven borders" should be examined promptly by a board certified dermatologist.     Color: Healthy moles tend to be one color throughout. Melanomas tend to be made up of different colors ranging from dark black, blue, white, or red. Any mole that demonstrates a color change should be examined promptly by a board certified dermatologist.     Diameter: Healthy moles tend to be smaller than 0.6 cm in size; an exception are "congenital nevi" that can be larger. Melanomas tend to grow and can often be greater than 0.6 cm (1/4 of an inch, or the size of a pencil eraser). This is only a guideline, and many normal moles may be larger than 0.6 cm without being unhealthy.   Any mole that starts to change in size (small to bigger or bigger to smaller) should be examined promptly by a board certified dermatologist.     Evolving: Healthy moles tend to "stay the same."  Melanomas may often show signs of change or evolution such as a change in size, shape, color, or elevation. Any mole that starts to itch, bleed, crust, burn, hurt, or ulcerate or demonstrate a change or evolution should be examined promptly by a board certified dermatologist.        LENTIGO    Physical Exam:  • Anatomic Location Affected:  trunk, arms  • Morphological Description:  Light brown macules  • Pertinent Positives:  • Pertinent Negatives: Additional History of Present Condition:      Assessment and Plan:  Based on a thorough discussion of this condition and the management approach to it (including a comprehensive discussion of the known risks, side effects and potential benefits of treatment), the patient (family) agrees to implement the following specific plan:  • When outside we recommend using a wide brim hat, sunglasses, long sleeve and pants, sunscreen with SPF 82+ with reapplication every 2 hours, or SPF specific clothing       What is a lentigo? A lentigo is a pigmented flat or slightly raised lesion with a clearly defined edge. Unlike an ephelis (freckle), it does not fade in the winter months. There are several kinds of lentigo. The name lentigo originally referred to its appearance resembling a small lentil. The plural of lentigo is lentigines, although “lentigos” is also in common use. Who gets lentigines? Lentigines can affect males and females of all ages and races. Solar lentigines are especially prevalent in fair skinned adults. Lentigines associated with syndromes are present at birth or arise during childhood. What causes lentigines?   Common forms of lentigo are due to exposure to ultraviolet radiation:  • Sun damage including sunburn   • Indoor tanning   • Phototherapy, especially photochemotherapy (PUVA)    Ionizing radiation, eg radiation therapy, can also cause lentigines. Several familial syndromes associated with widespread lentigines originate from mutations in Brian-MAP kinase, mTOR signaling and PTEN pathways. What is the treatment for lentigines? Most lentigines are left alone. Attempts to lighten them may not be successful. The following approaches are used:  • SPF 50+ broad-spectrum sunscreen   • Hydroquinone bleaching cream   • Alpha hydroxy acids   • Vitamin C   • Retinoids   • Azelaic acid   • Chemical peels  Individual lesions can be permanently removed using:  • Cryotherapy   • Intense pulsed light   • Pigment lasers    How can lentigines be prevented? Lentigines associated with exposure ultraviolet radiation can be prevented by very careful sun protection. Clothing is more successful at preventing new lentigines than are sunscreens. What is the outlook for lentigines? Lentigines usually persist. They may increase in number with age and sun exposure. Some in sun-protected sites may fade and disappear. CHERRY ANGIOMAS    Physical Exam:  • Anatomic Location Affected:  trunk  • Morphological Description:  Scattered cherry red, 1-4 mm papules. • Pertinent Positives:  • Pertinent Negatives: Additional History of Present Condition:      Assessment and Plan:  Based on a thorough discussion of this condition and the management approach to it (including a comprehensive discussion of the known risks, side effects and potential benefits of treatment), the patient (family) agrees to implement the following specific plan:  • Monitor for changes  • Benign, reassured  •     Assessment and Plan:    Cherry angioma, also known as [employer] spots, are benign vascular skin lesions. A "cherry angioma" is a firm red, blue or purple papule, 0.1-1 cm in diameter. When thrombosed, they can appear black in colour until evaluated with a dermatoscope when the red or purple colour is more easily seen.  Cherry angioma may develop on any part of the body but most often appear on the scalp, face, lips and trunk. An angioma is due to proliferating endothelial cells; these are the cells that line the inside of a blood vessel. Angiomas can arise in early life or later in life; the most common type of angioma is a cherry angioma. Cherry angiomas are very common in males and females of any age or race. They are more noticeable in white skin than in skin of colour. They markedly increase in number from about the age of 36. There may be a family history of similar lesions. Eruptive cherry angiomas have been rarely reported to be associated with internal malignancy. The cause of angiomas is unknown. Genetic analysis of cherry angiomas has shown that they frequently carry specific somatic missense mutations in the GNAQ and GNA11 (Q209H) genes, which are involved in other vascular and melanocytic proliferations. SEBORRHEIC KERATOSIS; NON-INFLAMED    Physical Exam:  • Anatomic Location Affected:  trunk  • Morphological Description:  Flat and raised, waxy, smooth to warty textured, yellow to brownish-grey to dark brown to blackish, discrete, "stuck-on" appearing papules. • Pertinent Positives:  • Pertinent Negatives: Additional History of Present Condition:      Assessment and Plan:  Based on a thorough discussion of this condition and the management approach to it (including a comprehensive discussion of the known risks, side effects and potential benefits of treatment), the patient (family) agrees to implement the following specific plan:  • Monitor for changes  • Benign, reassured  •     Seborrheic Keratosis  A seborrheic keratosis is a harmless warty spot that appears during adult life as a common sign of skin aging. Seborrheic keratoses can arise on any area of skin, covered or uncovered, with the usual exception of the palms and soles. They do not arise from mucous membranes. Seborrheic keratoses can have highly variable appearance. Seborrheic keratoses are extremely common. It has been estimated that over 90% of adults over the age of 61 years have one or more of them. They occur in males and females of all races, typically beginning to erupt in the 35s or 45s. They are uncommon under the age of 21 years. The precise cause of seborrhoeic keratoses is not known. Seborrhoeic keratoses are considered degenerative in nature. As time goes by, seborrheic keratoses tend to become more numerous. Some people inherit a tendency to develop a very large number of them; some people may have hundreds of them. There is no easy way to remove multiple lesions on a single occasion. Unless a specific lesion is "inflamed" and is causing pain or stinging/burning or is bleeding, most insurance companies do not authorize treatment. XEROSIS ("DRY SKIN")    Physical Exam:  • Anatomic Location Affected:  diffuse  • Morphological Description:  xerosis  • Pertinent Positives:  • Pertinent Negatives: Additional History of Present Condition:      Assessment and Plan:  Based on a thorough discussion of this condition and the management approach to it (including a comprehensive discussion of the known risks, side effects and potential benefits of treatment), the patient (family) agrees to implement the following specific plan:  • Use moisturizer like Eucerin,Cerave or Aveeno Cream 3 times a day for the dry skin   •   •    •     Dry skin refers to skin that feels dry to touch. Dry skin has a dull surface with a rough, scaly quality. The skin is less pliable and cracked. When dryness is severe, the skin may become inflamed and fissured. Although any body site can be dry, dry skin tends to affect the shins more than any other site. Dry skin is lacking moisture in the outer horny cell layer (stratum corneum) and this results in cracks in the skin surface. Dry skin is also called xerosis, xeroderma or asteatosis (lack of fat).   It can affect males and females of all ages. There is some racial variability in water and lipid content of the skin. • Dry skin that starts in early childhood may be one of about 20 types of ichthyosis (fish-scale skin). There is often a family history of dry skin. • Dry skin is commonly seen in people with atopic dermatitis. • Nearly everyone > 60 years has dry skin. Dry skin that begins later may be seen in people with certain diseases and conditions. • Postmenopausal women  • Hypothyroidism  • Chronic renal disease   • Malnutrition and weight loss   • Subclinical dermatitis   • Treatment with certain drugs such as oral retinoids, diuretics and epidermal growth factor receptor inhibitors      What is the treatment for dry skin? The mainstay of treatment of dry skin and ichthyosis is moisturisers/emollients. They should be applied liberally and often enough to:  • Reduce itch   • Improve the barrier function   • Prevent entry of irritants, bacteria   • Reduce transepidermal water loss. How can dry skin be prevented? Eliminate aggravating factors:  • Reduce the frequency of bathing. • A humidifier in winter and air conditioner in summer   • Compare having a short shower with a prolonged soak in a bath. • Use lukewarm, not hot, water. • Replace standard soap with a substitute such as a synthetic detergent cleanser, water-miscible emollient, bath oil, anti-pruritic tar oil, colloidal oatmeal etc.   • Apply an emollient liberally and often, particularly shortly after bathing, and when itchy. The drier the skin, the thicker this should be, especially on the hands. What is the outlook for dry skin? A tendency to dry skin may persist life-long, or it may improve once contributing factors are controlled.     HISTORY OF BASAL CELL CARCINOMA    Physical Exam:  • Anatomic Location Affected:  Right cheek  • Morphological Description of scar:  Well healed scar  • Suspected Recurrence: No  • Pertinent Positives:  • Pertinent Negatives: Additional History of Present Condition:  History of basal cell carcinoma with no sign of recurrence. 2001    Assessment and Plan:  Based on a thorough discussion of this condition and the management approach to it (including a comprehensive discussion of the known risks, side effects and potential benefits of treatment), the patient (family) agrees to implement the following specific plan:  • When outside we recommend using a wide brim hat, sunglasses, long sleeve and pants, sunscreen with SPF 67+ with reapplication every 2 hours, or SPF specific clothing. • Continue to have regular full body skin exams     How can basal cell carcinoma be prevented? The most important way to prevent BCC is to avoid sunburn. This is especially important in childhood and early life. Fair skinned individuals and those with a personal or family history of BCC should protect their skin from sun exposure daily, year-round and lifelong. • Stay indoors or under the shade in the middle of the day   • Wear covering clothing   • Apply high protection factor SPF50+ broad-spectrum sunscreens generously to exposed skin if outdoors   • Avoid indoor tanning (sun beds, solaria)  • Oral nicotinamide (vitamin B3) in a dose of 500 mg twice daily may reduce the number and severity of BCCs. What is the outlook for basal cell carcinoma? Most BCCs are cured by treatment. Cure is most likely if treatment is undertaken when the lesion is small. About 50% of people with BCC develop a second one within 3 years of the first. They are also at increased risk of other skin cancers, especially melanoma. Regular self-skin examinations and long-term annual skin checks by an experienced health professional are recommended. EPIDERMAL INCLUSION CYST    Physical Exam:  • Anatomic Location Affected:  Left back  • Morphological Description:  Subcutaneous nodule with punctum  • Pertinent Positives:  • Pertinent Negatives:     Additional History of Present Condition:  Found on exam    Assessment and Plan:  Based on a thorough discussion of this condition and the management approach to it (including a comprehensive discussion of the known risks, side effects and potential benefits of treatment), the patient (family) agrees to implement the following specific plan:  • Benign, assurance provided. Removal could be done by an excision if cyst becomes bothersome. What are epidermal inclusion cysts? Epidermal inclusion cysts are the most common, benign cutaneous cysts. There are many different names for epidermal inclusion cysts, including epidermoid cyst, epidermal cyst, infundibular cyst, inclusion cyst, and keratin cyst. These cysts can occur anywhere on the body and typically present as nodules directly underneath the skin. There is often a visible pore or opening in the center. The cysts are freely moveable and can range from a few millimeters to several centimeters in diameter. The center of epidermoid cysts almost always contains keratin, which has a cheesy appearance, and not sebum. They also do not originate from sebaceous glands. Therefore, epidermal inclusion cysts are not the same as sebaceous cysts. Cysts may remain stable or progressively enlarge over time. There are no reliable predictive factors to tell if an epidermal inclusion cyst will enlarge, become inflamed, or remain quiescent. Infected cysts tend to become larger, turn red, and are more noticeable to the patient. There may be accompanying pain and discomfort. What causes epidermal inclusion cysts? Epidermal inclusion cysts often appear out of the blue and are not contagious. They are due to a proliferation of epidermal cells within the dermis and are more common in men than women. They occur more frequently in patients in their 20s to 45s.  Epidermal inclusion cysts by themselves are usually not inherited, but they can be hereditary in rare syndromes such as Yates syndrome, nodular elastosis with cysts and comedones (Favre-Racouchot syndrome), and basal cell nevus syndrome (Gorlin syndrome). Elderly patients with chronic sun-damaged skin areas have a higher likelihood of developing epidermoid cysts. They often occur in areas where hair follicles have been inflamed or repeatedly irritated are more frequent in patients with acne vulgaris. In the  period, they are called milia. Patients on BRAF inhibitors such as imiquimod and cyclosporine have a higher incidence of epidermoid cysts of the face. How do we diagnose an epidermal inclusion cyst?  Epidermoid inclusion cysts are often diagnosed by history and physical exam. There is usually no need for biopsy prior to removal.  Radiographic and laboratory exams, such as ultrasound studies, are unnecessary and not typically ordered unless the practitioner suspects a genetic condition. What is the treatment for an epidermal inclusion cyst?  Inflamed, uninfected epidermal inclusion cysts rarely resolve spontaneously without therapy or surgical intervention. Treatment is not emergent unless desired by the patient. Definitive treatment is via surgical excision with walls intact. This method will prevent recurrence. This is best done when the cyst is not inflamed, to decrease the probability of rupture during surgery. - A local anesthetic will be injected around the cyst  - A small incision is made in the skin overlying the cyst, and contents are expressed  - The incision is repaired with sutures    Another option is to use a 4mm punch biopsy with cyst extraction through the defect. Incision and drainage is often needed if the cyst is infected or inflamed. If there is surrounding cellulitis, oral antibiotic therapy may be necessary. The common agents used target methicillin sensitive Staphylococcal aureus and methicillin resistant S aureus in areas of high prevalence.      SEBORRHEIC DERMATITIS    Physical Exam:  • Anatomic Location Affected:  Left and midforehead  • Morphological Description:  Scaly pink plaques  • Pertinent Positives:  • Pertinent Negatives: Additional History of Present Condition:  Found on exam    Assessment and Plan:  Based on a thorough discussion of this condition and the management approach to it (including a comprehensive discussion of the known risks, side effects and potential benefits of treatment), the patient (family) agrees to implement the following specific plan:  • Ketoconazole 2% Cream: Apply topically twice a day when having an active flare; when not having a flare use twice a week for maintenance. Prescription sent to your pharmacy. Seborrheic Dermatitis   Seborrheic dermatitis is a common, chronic or relapsing form of eczema/dermatitis that mainly affects the sebaceous, gland-rich regions of the scalp, face, and trunk. There are infantile and adult forms of seborrhoeic dermatitis. It is sometimes associated with psoriasis and, in that clinical scenario, may be referred to as "sebo-psoriasis."  Seborrheic dermatitis is also known as "seborrheic eczema."  Dandruff (also called "pityriasis capitis") is an uninflamed form of seborrhoeic dermatitis. Dandruff presents as bran-like scaly patches scattered within hair-bearing areas of the scalp. In an infant, this condition may be referred to as "cradle cap."  The cause of seborrheic dermatitis is not completely understood. It is associated with proliferation of various species of the skin commensal Malassezia, in its yeast (non-pathogenic) form. Its metabolites (such as the fatty acids oleic acid, malssezin, and indole-3-carbaldehyde) may cause an inflammatory reaction. Differences in skin barrier lipid content and function may account for individual presentations. Infantile Seborrheic Dermatitis  Infantile seborrheic dermatitis affects babies under the age of 1 months and usually resolves by 1012 months of age.   Infantile seborrheic dermatitis causes "cradle cap" (diffuse, greasy scaling on scalp). The rash may spread to affect armpit and groin folds (a type of "napkin dermatitis"). There may be associated salmon-pink colored patches that may flake or peel. The rash in this case is usually not especially itchy, so the baby often appears undisturbed by the rash, even when more generalized. Adult Seborrheic Dermatitis  Adult seborrheic dermatitis tends to begin in late adolescence; prevalence is greatest in young adults and in the elderly. It is more common in males than in females. The following factors are sometimes associated with severe adult seborrheic dermatitis:  • Oily skin  • Familial tendency to seborrhoeic dermatitis or a family history of psoriasis  • Immunosuppression: organ transplant recipient, human immunodeficiency virus (HIV) infection and patients with lymphoma  • Neurological and psychiatric diseases: Parkinson disease, tardive dyskinesia, depression, epilepsy, facial nerve palsy, spinal cord injury and congenital disorders such as Down syndrome  • Treatment for psoriasis with psoralen and ultraviolet A (PUVA) therapy  • Lack of sleep  • Stressful events. In adults, seborrheic dermatitis may typically affect the scalp, face (creases around the nose, behind ears, within eyebrows) and upper trunk.  Typical clinical features include:  • Winter flares, improving in summer following sun exposure  • Minimal itch most of the time  • Combination oily and dry mid-facial skin  • Ill-defined localized scaly patches or diffuse scale in the scalp  • Blepharitis; scaly red eyelid margins  • Kane-pink, thin, scaly, and ill-defined plaques in skin folds on both sides of the face  • Petal or ring-shaped flaky patches on hair-line and on anterior chest  • Rash in armpits, under the breasts, in the groin folds and genital creases  • Superficial folliculitis (inflamed hair follicles) on cheeks and upper trunk    Seborrheic dermatitis is diagnosed by its clinical appearance and behavior. Skin biopsy may be helpful but is rarely necessary to make this diagnosis. ACTINIC KERATOSIS    Physical Exam:  • Anatomic Location Affected:  Nose  • Morphological Description:  Scaly pink papules  • Pertinent Positives:  • Pertinent Negatives:      Assessment and Plan:  Based on a thorough discussion of this condition and the management approach to it (including a comprehensive discussion of the known risks, side effects and potential benefits of treatment), the patient (family) agrees to implement the following specific plan:  • When outside we recommend using a wide brim hat, sunglasses, long sleeve and pants, sunscreen with SPF 63+ with reapplication every 2 hours, or SPF specific clothing   liquid nitrogen to treat areas. Consent obtained. Expect area to blister, crust, and then fall off within 2 weeks. Please use vaseline. PROCEDURE:  DESTRUCTION OF PRE-MALIGNANT LESIONS  After a thorough discussion of treatment options and risk/benefits/alternatives (including but not limited to local pain, scarring, dyspigmentation, blistering, and possible superinfection), verbal and written consent were obtained and the aforementioned lesions were treated on with cryotherapy using liquid nitrogen x 1 cycle for 5-10 seconds. TOTAL NUMBER of 1 pre-malignant lesions were treated today on the ANATOMIC LOCATION: nose. The patient tolerated the procedure well, and after-care instructions were provided.            Scribe Attestation    I,:  Shara Dubois am acting as a scribe while in the presence of the attending physician.:       I,:  Holly Howell MD personally performed the services described in this documentation    as scribed in my presence.:

## 2023-08-10 NOTE — PATIENT INSTRUCTIONS
MELANOCYTIC NEVI ("Moles")    Assessment and Plan:  Based on a thorough discussion of this condition and the management approach to it (including a comprehensive discussion of the known risks, side effects and potential benefits of treatment), the patient (family) agrees to implement the following specific plan:  When outside we recommend using a wide brim hat, sunglasses, long sleeve and pants, sunscreen with SPF 34+ with reapplication every 2 hours, or SPF specific clothing   Benign, reassured  Annual skin check     Melanocytic Nevi  Melanocytic nevi ("moles") are tan or brown, raised or flat areas of the skin which have an increased number of melanocytes. Melanocytes are the cells in our body which make pigment and account for skin color. Some moles are present at birth (I.e., "congenital nevi"), while others come up later in life (i.e., "acquired nevi"). The sun can stimulate the body to make more moles. Sunburns are not the only thing that triggers more moles. Chronic sun exposure can do it too. Clinically distinguishing a healthy mole from melanoma may be difficult, even for experienced dermatologists. The "ABCDE's" of moles have been suggested as a means of helping to alert a person to a suspicious mole and the possible increased risk of melanoma. The suggestions for raising alert are as follows:    Asymmetry: Healthy moles tend to be symmetric, while melanomas are often asymmetric. Asymmetry means if you draw a line through the mole, the two halves do not match in color, size, shape, or surface texture. Asymmetry can be a result of rapid enlargement of a mole, the development of a raised area on a previously flat lesion, scaling, ulceration, bleeding or scabbing within the mole. Any mole that starts to demonstrate "asymmetry" should be examined promptly by a board certified dermatologist.     Border: Healthy moles tend to have discrete, even borders.   The border of a melanoma often blends into the normal skin and does not sharply delineate the mole from normal skin. Any mole that starts to demonstrate "uneven borders" should be examined promptly by a board certified dermatologist.     Color: Healthy moles tend to be one color throughout. Melanomas tend to be made up of different colors ranging from dark black, blue, white, or red. Any mole that demonstrates a color change should be examined promptly by a board certified dermatologist.     Diameter: Healthy moles tend to be smaller than 0.6 cm in size; an exception are "congenital nevi" that can be larger. Melanomas tend to grow and can often be greater than 0.6 cm (1/4 of an inch, or the size of a pencil eraser). This is only a guideline, and many normal moles may be larger than 0.6 cm without being unhealthy. Any mole that starts to change in size (small to bigger or bigger to smaller) should be examined promptly by a board certified dermatologist.     Evolving: Healthy moles tend to "stay the same."  Melanomas may often show signs of change or evolution such as a change in size, shape, color, or elevation. Any mole that starts to itch, bleed, crust, burn, hurt, or ulcerate or demonstrate a change or evolution should be examined promptly by a board certified dermatologist.        Gely Reilly and Plan:  Based on a thorough discussion of this condition and the management approach to it (including a comprehensive discussion of the known risks, side effects and potential benefits of treatment), the patient (family) agrees to implement the following specific plan:  When outside we recommend using a wide brim hat, sunglasses, long sleeve and pants, sunscreen with SPF 87+ with reapplication every 2 hours, or SPF specific clothing       What is a lentigo? A lentigo is a pigmented flat or slightly raised lesion with a clearly defined edge. Unlike an ephelis (freckle), it does not fade in the winter months. There are several kinds of lentigo.   The name lentigo originally referred to its appearance resembling a small lentil. The plural of lentigo is lentigines, although “lentigos” is also in common use. Who gets lentigines? Lentigines can affect males and females of all ages and races. Solar lentigines are especially prevalent in fair skinned adults. Lentigines associated with syndromes are present at birth or arise during childhood. What causes lentigines? Common forms of lentigo are due to exposure to ultraviolet radiation:  Sun damage including sunburn   Indoor tanning   Phototherapy, especially photochemotherapy (PUVA)    Ionizing radiation, eg radiation therapy, can also cause lentigines. Several familial syndromes associated with widespread lentigines originate from mutations in Brian-MAP kinase, mTOR signaling and PTEN pathways. What is the treatment for lentigines? Most lentigines are left alone. Attempts to lighten them may not be successful. The following approaches are used:  SPF 50+ broad-spectrum sunscreen   Hydroquinone bleaching cream   Alpha hydroxy acids   Vitamin C   Retinoids   Azelaic acid   Chemical peels  Individual lesions can be permanently removed using:  Cryotherapy   Intense pulsed light   Pigment lasers    How can lentigines be prevented? Lentigines associated with exposure ultraviolet radiation can be prevented by very careful sun protection. Clothing is more successful at preventing new lentigines than are sunscreens. What is the outlook for lentigines? Lentigines usually persist. They may increase in number with age and sun exposure. Some in sun-protected sites may fade and disappear.     FIGUEROA ANGIOMAS    Assessment and Plan:  Based on a thorough discussion of this condition and the management approach to it (including a comprehensive discussion of the known risks, side effects and potential benefits of treatment), the patient (family) agrees to implement the following specific plan:  Monitor for changes  Benign, reassured      Assessment and Plan:    Cherry angioma, also known as [employer] spots, are benign vascular skin lesions. A "cherry angioma" is a firm red, blue or purple papule, 0.1-1 cm in diameter. When thrombosed, they can appear black in colour until evaluated with a dermatoscope when the red or purple colour is more easily seen. Cherry angioma may develop on any part of the body but most often appear on the scalp, face, lips and trunk. An angioma is due to proliferating endothelial cells; these are the cells that line the inside of a blood vessel. Angiomas can arise in early life or later in life; the most common type of angioma is a cherry angioma. Cherry angiomas are very common in males and females of any age or race. They are more noticeable in white skin than in skin of colour. They markedly increase in number from about the age of 36. There may be a family history of similar lesions. Eruptive cherry angiomas have been rarely reported to be associated with internal malignancy. The cause of angiomas is unknown. Genetic analysis of cherry angiomas has shown that they frequently carry specific somatic missense mutations in the GNAQ and GNA11 (Q209H) genes, which are involved in other vascular and melanocytic proliferations. SEBORRHEIC KERATOSIS; NON-INFLAMED    Assessment and Plan:  Based on a thorough discussion of this condition and the management approach to it (including a comprehensive discussion of the known risks, side effects and potential benefits of treatment), the patient (family) agrees to implement the following specific plan:  Monitor for changes  Benign, reassured      Seborrheic Keratosis  A seborrheic keratosis is a harmless warty spot that appears during adult life as a common sign of skin aging. Seborrheic keratoses can arise on any area of skin, covered or uncovered, with the usual exception of the palms and soles. They do not arise from mucous membranes.  Seborrheic keratoses can have highly variable appearance. Seborrheic keratoses are extremely common. It has been estimated that over 90% of adults over the age of 61 years have one or more of them. They occur in males and females of all races, typically beginning to erupt in the 35s or 45s. They are uncommon under the age of 21 years. The precise cause of seborrhoeic keratoses is not known. Seborrhoeic keratoses are considered degenerative in nature. As time goes by, seborrheic keratoses tend to become more numerous. Some people inherit a tendency to develop a very large number of them; some people may have hundreds of them. There is no easy way to remove multiple lesions on a single occasion. Unless a specific lesion is "inflamed" and is causing pain or stinging/burning or is bleeding, most insurance companies do not authorize treatment. XEROSIS ("DRY SKIN")  Assessment and Plan:  Based on a thorough discussion of this condition and the management approach to it (including a comprehensive discussion of the known risks, side effects and potential benefits of treatment), the patient (family) agrees to implement the following specific plan:  Use moisturizer like Eucerin,Cerave or Aveeno Cream 3 times a day for the dry skin            Dry skin refers to skin that feels dry to touch. Dry skin has a dull surface with a rough, scaly quality. The skin is less pliable and cracked. When dryness is severe, the skin may become inflamed and fissured. Although any body site can be dry, dry skin tends to affect the shins more than any other site. Dry skin is lacking moisture in the outer horny cell layer (stratum corneum) and this results in cracks in the skin surface. Dry skin is also called xerosis, xeroderma or asteatosis (lack of fat). It can affect males and females of all ages. There is some racial variability in water and lipid content of the skin.   Dry skin that starts in early childhood may be one of about 20 types of ichthyosis (fish-scale skin). There is often a family history of dry skin. Dry skin is commonly seen in people with atopic dermatitis. Nearly everyone > 60 years has dry skin. Dry skin that begins later may be seen in people with certain diseases and conditions. Postmenopausal women  Hypothyroidism  Chronic renal disease   Malnutrition and weight loss   Subclinical dermatitis   Treatment with certain drugs such as oral retinoids, diuretics and epidermal growth factor receptor inhibitors      What is the treatment for dry skin? The mainstay of treatment of dry skin and ichthyosis is moisturisers/emollients. They should be applied liberally and often enough to:  Reduce itch   Improve the barrier function   Prevent entry of irritants, bacteria   Reduce transepidermal water loss. How can dry skin be prevented? Eliminate aggravating factors:  Reduce the frequency of bathing. A humidifier in winter and air conditioner in summer   Compare having a short shower with a prolonged soak in a bath. Use lukewarm, not hot, water. Replace standard soap with a substitute such as a synthetic detergent cleanser, water-miscible emollient, bath oil, anti-pruritic tar oil, colloidal oatmeal etc.   Apply an emollient liberally and often, particularly shortly after bathing, and when itchy. The drier the skin, the thicker this should be, especially on the hands. What is the outlook for dry skin? A tendency to dry skin may persist life-long, or it may improve once contributing factors are controlled.     HISTORY OF BASAL CELL CARCINOMA    Assessment and Plan:  Based on a thorough discussion of this condition and the management approach to it (including a comprehensive discussion of the known risks, side effects and potential benefits of treatment), the patient (family) agrees to implement the following specific plan:  When outside we recommend using a wide brim hat, sunglasses, long sleeve and pants, sunscreen with SPF 61+ with reapplication every 2 hours, or SPF specific clothing. Continue to have regular full body skin exams     How can basal cell carcinoma be prevented? The most important way to prevent BCC is to avoid sunburn. This is especially important in childhood and early life. Fair skinned individuals and those with a personal or family history of BCC should protect their skin from sun exposure daily, year-round and lifelong. Stay indoors or under the shade in the middle of the day   Wear covering clothing   Apply high protection factor SPF50+ broad-spectrum sunscreens generously to exposed skin if outdoors   Avoid indoor tanning (sun beds, solaria)  Oral nicotinamide (vitamin B3) in a dose of 500 mg twice daily may reduce the number and severity of BCCs. What is the outlook for basal cell carcinoma? Most BCCs are cured by treatment. Cure is most likely if treatment is undertaken when the lesion is small. About 50% of people with BCC develop a second one within 3 years of the first. They are also at increased risk of other skin cancers, especially melanoma. Regular self-skin examinations and long-term annual skin checks by an experienced health professional are recommended. EPIDERMAL INCLUSION CYST    Assessment and Plan:  Based on a thorough discussion of this condition and the management approach to it (including a comprehensive discussion of the known risks, side effects and potential benefits of treatment), the patient (family) agrees to implement the following specific plan:  Benign, assurance provided. Removal could be done by an excision if cyst becomes bothersome. What are epidermal inclusion cysts? Epidermal inclusion cysts are the most common, benign cutaneous cysts.  There are many different names for epidermal inclusion cysts, including epidermoid cyst, epidermal cyst, infundibular cyst, inclusion cyst, and keratin cyst. These cysts can occur anywhere on the body and typically present as nodules directly underneath the skin. There is often a visible pore or opening in the center. The cysts are freely moveable and can range from a few millimeters to several centimeters in diameter. The center of epidermoid cysts almost always contains keratin, which has a cheesy appearance, and not sebum. They also do not originate from sebaceous glands. Therefore, epidermal inclusion cysts are not the same as sebaceous cysts. Cysts may remain stable or progressively enlarge over time. There are no reliable predictive factors to tell if an epidermal inclusion cyst will enlarge, become inflamed, or remain quiescent. Infected cysts tend to become larger, turn red, and are more noticeable to the patient. There may be accompanying pain and discomfort. What causes epidermal inclusion cysts? Epidermal inclusion cysts often appear out of the blue and are not contagious. They are due to a proliferation of epidermal cells within the dermis and are more common in men than women. They occur more frequently in patients in their 20s to 45s. Epidermal inclusion cysts by themselves are usually not inherited, but they can be hereditary in rare syndromes such as Yates syndrome, nodular elastosis with cysts and comedones (Favre-Racouchot syndrome), and basal cell nevus syndrome (Gorlin syndrome). Elderly patients with chronic sun-damaged skin areas have a higher likelihood of developing epidermoid cysts. They often occur in areas where hair follicles have been inflamed or repeatedly irritated are more frequent in patients with acne vulgaris. In the  period, they are called milia. Patients on BRAF inhibitors such as imiquimod and cyclosporine have a higher incidence of epidermoid cysts of the face.     How do we diagnose an epidermal inclusion cyst?  Epidermoid inclusion cysts are often diagnosed by history and physical exam. There is usually no need for biopsy prior to removal.  Radiographic and laboratory exams, such as ultrasound studies, are unnecessary and not typically ordered unless the practitioner suspects a genetic condition. What is the treatment for an epidermal inclusion cyst?  Inflamed, uninfected epidermal inclusion cysts rarely resolve spontaneously without therapy or surgical intervention. Treatment is not emergent unless desired by the patient. Definitive treatment is via surgical excision with walls intact. This method will prevent recurrence. This is best done when the cyst is not inflamed, to decrease the probability of rupture during surgery. A local anesthetic will be injected around the cyst  A small incision is made in the skin overlying the cyst, and contents are expressed  The incision is repaired with sutures    Another option is to use a 4mm punch biopsy with cyst extraction through the defect. Incision and drainage is often needed if the cyst is infected or inflamed. If there is surrounding cellulitis, oral antibiotic therapy may be necessary. The common agents used target methicillin sensitive Staphylococcal aureus and methicillin resistant S aureus in areas of high prevalence. SEBORRHEIC DERMATITIS    Assessment and Plan:  Based on a thorough discussion of this condition and the management approach to it (including a comprehensive discussion of the known risks, side effects and potential benefits of treatment), the patient (family) agrees to implement the following specific plan:  Ketoconazole 2% Cream: Apply topically twice a day when having an active flare; when not having a flare use twice a week for maintenance. Prescription sent to your pharmacy. Seborrheic Dermatitis   Seborrheic dermatitis is a common, chronic or relapsing form of eczema/dermatitis that mainly affects the sebaceous, gland-rich regions of the scalp, face, and trunk. There are infantile and adult forms of seborrhoeic dermatitis.  It is sometimes associated with psoriasis and, in that clinical scenario, may be referred to as "sebo-psoriasis."  Seborrheic dermatitis is also known as "seborrheic eczema."  Dandruff (also called "pityriasis capitis") is an uninflamed form of seborrhoeic dermatitis. Dandruff presents as bran-like scaly patches scattered within hair-bearing areas of the scalp. In an infant, this condition may be referred to as "cradle cap."  The cause of seborrheic dermatitis is not completely understood. It is associated with proliferation of various species of the skin commensal Malassezia, in its yeast (non-pathogenic) form. Its metabolites (such as the fatty acids oleic acid, malssezin, and indole-3-carbaldehyde) may cause an inflammatory reaction. Differences in skin barrier lipid content and function may account for individual presentations. Infantile Seborrheic Dermatitis  Infantile seborrheic dermatitis affects babies under the age of 1 months and usually resolves by 1012 months of age. Infantile seborrheic dermatitis causes "cradle cap" (diffuse, greasy scaling on scalp). The rash may spread to affect armpit and groin folds (a type of "napkin dermatitis"). There may be associated salmon-pink colored patches that may flake or peel. The rash in this case is usually not especially itchy, so the baby often appears undisturbed by the rash, even when more generalized. Adult Seborrheic Dermatitis  Adult seborrheic dermatitis tends to begin in late adolescence; prevalence is greatest in young adults and in the elderly. It is more common in males than in females.     The following factors are sometimes associated with severe adult seborrheic dermatitis:  Oily skin  Familial tendency to seborrhoeic dermatitis or a family history of psoriasis  Immunosuppression: organ transplant recipient, human immunodeficiency virus (HIV) infection and patients with lymphoma  Neurological and psychiatric diseases: Parkinson disease, tardive dyskinesia, depression, epilepsy, facial nerve palsy, spinal cord injury and congenital disorders such as Down syndrome  Treatment for psoriasis with psoralen and ultraviolet A (PUVA) therapy  Lack of sleep  Stressful events. In adults, seborrheic dermatitis may typically affect the scalp, face (creases around the nose, behind ears, within eyebrows) and upper trunk. Typical clinical features include: Winter flares, improving in summer following sun exposure  Minimal itch most of the time  Combination oily and dry mid-facial skin  Ill-defined localized scaly patches or diffuse scale in the scalp  Blepharitis; scaly red eyelid margins  Alta-pink, thin, scaly, and ill-defined plaques in skin folds on both sides of the face  Petal or ring-shaped flaky patches on hair-line and on anterior chest  Rash in armpits, under the breasts, in the groin folds and genital creases  Superficial folliculitis (inflamed hair follicles) on cheeks and upper trunk    Seborrheic dermatitis is diagnosed by its clinical appearance and behavior. Skin biopsy may be helpful but is rarely necessary to make this diagnosis. ACTINIC KERATOSIS    Assessment and Plan:  Based on a thorough discussion of this condition and the management approach to it (including a comprehensive discussion of the known risks, side effects and potential benefits of treatment), the patient (family) agrees to implement the following specific plan:  When outside we recommend using a wide brim hat, sunglasses, long sleeve and pants, sunscreen with SPF 83+ with reapplication every 2 hours, or SPF specific clothing   liquid nitrogen to treat areas. Consent obtained. Expect area to blister, crust, and then fall off within 2 weeks. Please use vaseline.                                      PROCEDURE:  DESTRUCTION OF PRE-MALIGNANT LESIONS  After a thorough discussion of treatment options and risk/benefits/alternatives (including but not limited to local pain, scarring, dyspigmentation, blistering, and possible superinfection), verbal and written consent were obtained and the aforementioned lesions were treated on with cryotherapy using liquid nitrogen x 1 cycle for 5-10 seconds. TOTAL NUMBER of 1 pre-malignant lesions were treated today on the ANATOMIC LOCATION: nose. The patient tolerated the procedure well, and after-care instructions were provided.

## 2023-08-28 NOTE — PROGRESS NOTES
Assessment and Plan:     Problem List Items Addressed This Visit        Endocrine    Subclinical hypothyroidism    Relevant Orders    TSH, 3rd generation with Free T4 reflex       Cardiovascular and Mediastinum    Hypertension     Stable on diovan/hct           Relevant Medications    valsartan-hydrochlorothiazide (DIOVAN-HCT) 320-25 MG per tablet    Other Relevant Orders    CBC    Comprehensive metabolic panel    Lipid Panel with Direct LDL reflex       Musculoskeletal and Integument    Rosacea     metrogel refilled           Relevant Medications    metroNIDAZOLE (METROGEL) 0 75 % gel       Other    Medicare annual wellness visit, subsequent - Primary     Mammogram and dexa ordered  Labs ordered           Urge incontinence of urine     vesicare has been effective           Relevant Medications    solifenacin (VESICARE) 5 mg tablet      Other Visit Diagnoses     Encounter for screening mammogram for malignant neoplasm of breast        Relevant Orders    Mammo screening bilateral w 3d & cad    Encounter for osteoporosis screening in asymptomatic postmenopausal patient        Relevant Orders    DXA bone density spine hip and pelvis    Essential hypertension        Relevant Medications    valsartan-hydrochlorothiazide (DIOVAN-HCT) 320-25 MG per tablet        BMI Counseling: Body mass index is 28 12 kg/m²  The BMI is above normal  Nutrition recommendations include encouraging healthy choices of fruits and vegetables  Rationale for BMI follow-up plan is due to patient being overweight or obese  Depression Screening and Follow-up Plan: Patient was screened for depression during today's encounter  They screened negative with a PHQ-2 score of 0  Preventive health issues were discussed with patient, and age appropriate screening tests were ordered as noted in patient's After Visit Summary    Personalized health advice and appropriate referrals for health education or preventive services given if needed, as noted in See HPI patient's After Visit Summary  History of Present Illness:     Patient presents for Welcome to Medicare visit  Patient Care Team:  Ricky Gomez DO as PCP - MD Hansel Wilson MD     Review of Systems:     Review of Systems   Constitutional: Negative  HENT: Negative  Eyes: Negative  Respiratory: Negative  Cardiovascular: Negative  Gastrointestinal:        Acid reflux   Endocrine: Negative  Genitourinary: Positive for urgency (improved)  Musculoskeletal: Positive for arthralgias  Neurological: Negative  Hematological: Negative  Psychiatric/Behavioral: Negative         Problem List:     Patient Active Problem List   Diagnosis    History of basal cell carcinoma (BCC) of skin    Diverticulosis    Hypertension    Osteopenia    Palpitations    Rosacea    Subclinical hypothyroidism    Vitamin D deficiency    Medicare annual wellness visit, subsequent    Episodic lightheadedness    Increased urinary frequency    Urge incontinence of urine    Swelling of right parotid gland    Plantar fasciitis of left foot    GERD without esophagitis      Past Medical and Surgical History:     Past Medical History:   Diagnosis Date    Basal cell carcinoma 1991    Osteoporosis     Rosacea      Past Surgical History:   Procedure Laterality Date    ADENOIDECTOMY      BREAST CYST EXCISION Left 1989    BREAST CYST EXCISION Left 1989    BREAST CYST EXCISION Right 1980    HEMORRHOID SURGERY      INCISIONAL BREAST BIOPSY Left     OTHER SURGICAL HISTORY      Arthrocentesis Aspiration of Ganglion Cyst    TONSILLECTOMY      UTERINE FIBROID EMBOLIZATION        Family History:     Family History   Problem Relation Age of Onset    Hypertension Mother     Coronary artery disease Father     Diverticulitis Father         Colon    Stroke Father         Syndrome    Venous thrombosis Father         of the deep Vessels of the distal lower extremity    Diabetes Brother     Lung cancer Brother 48    Venous thrombosis Brother         of the deep vessels of the distal Lower extremity    Melanoma Brother     Diabetes Paternal Grandfather     Lung cancer Family         Adenocarcinoma of the Lung    Other Family         Disorders of Blood and Blood-forming Organs, Skin Disorder    Heart disease Family     Colon cancer Paternal Aunt     No Known Problems Daughter     No Known Problems Maternal Grandmother     No Known Problems Maternal Grandfather     No Known Problems Paternal Grandmother       Social History:     Social History     Socioeconomic History    Marital status: /Civil Union     Spouse name: None    Number of children: None    Years of education: None    Highest education level: None   Occupational History    None   Tobacco Use    Smoking status: Never Smoker    Smokeless tobacco: Never Used   Vaping Use    Vaping Use: Never used   Substance and Sexual Activity    Alcohol use: Yes     Comment: social drinker    Drug use: No    Sexual activity: Yes     Partners: Male     Birth control/protection: Post-menopausal   Other Topics Concern    None   Social History Narrative    Caffeine use    Exercise frequency      Social Determinants of Health     Financial Resource Strain: Not on file   Food Insecurity: Not on file   Transportation Needs: Not on file   Physical Activity: Not on file   Stress: Not on file   Social Connections: Not on file   Intimate Partner Violence: Not on file   Housing Stability: Not on file      Medications and Allergies:     Current Outpatient Medications   Medication Sig Dispense Refill    aspirin 81 MG tablet Take 1 tablet by mouth daily      Cholecalciferol (VITAMIN D3) 2000 units capsule Take 1 tablet by mouth daily      Magnesium 250 MG TABS Take 250 tablets by mouth daily as needed      metroNIDAZOLE (METROGEL) 0 75 % gel Apply topically 2 (two) times a day 45 g 5    metroNIDAZOLE (METROGEL) 1 % gel Apply topically      Multiple Vitamin-Folic Acid TABS Take 1 tablet by mouth daily      Psyllium 28 3 % POWD Take by mouth      solifenacin (VESICARE) 5 mg tablet Take 1 tablet (5 mg total) by mouth in the morning  90 tablet 3    valsartan-hydrochlorothiazide (DIOVAN-HCT) 320-25 MG per tablet Take 0 5 tablets by mouth in the morning and 0 5 tablets in the evening  90 tablet 3     No current facility-administered medications for this visit  Allergies   Allergen Reactions    Tetanus Antitoxin Hives    Penicillins Rash      Immunizations:     Immunization History   Administered Date(s) Administered    COVID-19 MODERNA VACC 0 25 ML IM BOOSTER 10/28/2021    COVID-19 MODERNA VACC 0 5 ML IM 01/23/2021, 02/18/2021    INFLUENZA 10/18/2019    Influenza Quadrivalent Preservative Free 3 years and older IM 09/30/2016    Influenza Split High Dose Preservative Free IM 09/25/2015    Influenza, high dose seasonal 0 7 mL 10/02/2020    Pneumococcal Conjugate 13-Valent 06/09/2015    Pneumococcal Polysaccharide PPV23 12/02/2011    Zoster 12/10/2012    Zoster Vaccine Recombinant 05/05/2019, 08/19/2019      Health Maintenance:         Topic Date Due    Colorectal Cancer Screening  04/06/2027    Hepatitis C Screening  Completed         Topic Date Due    COVID-19 Vaccine (4 - Booster for [place] series) 02/28/2022      Medicare Screening Tests and Risk Assessments:     Tonie Centeno is here for her Subsequent Wellness visit  Health Risk Assessment:   Patient rates overall health as very good  Patient feels that their physical health rating is same  Patient is very satisfied with their life  Eyesight was rated as same  Hearing was rated as same  Patient feels that their emotional and mental health rating is same  Patients states they are never, rarely angry  Patient states they are never, rarely unusually tired/fatigued  Pain experienced in the last 7 days has been none   Patient states that she has experienced no weight loss or gain in last 6 months  Depression Screening:   PHQ-2 Score: 0      Fall Risk Screening: In the past year, patient has experienced: no history of falling in past year      Urinary Incontinence Screening:   Patient has leaked urine accidently in the last six months  Home Safety:  Patient does not have trouble with stairs inside or outside of their home  Patient has working smoke alarms and has no working carbon monoxide detector  Home safety hazards include: none  Nutrition:   Current diet is Low Cholesterol and Low Saturated Fat  Medications:   Patient is currently taking over-the-counter supplements  OTC medications include: Vitamin D3 2000 and One a Day Multivitamin  Patient is able to manage medications  Activities of Daily Living (ADLs)/Instrumental Activities of Daily Living (IADLs):   Walk and transfer into and out of bed and chair?: Yes  Dress and groom yourself?: Yes    Bathe or shower yourself?: Yes    Feed yourself?  Yes  Do your laundry/housekeeping?: Yes  Manage your money, pay your bills and track your expenses?: Yes  Make your own meals?: Yes    Do your own shopping?: Yes    Previous Hospitalizations:   Any hospitalizations or ED visits within the last 12 months?: No      Advance Care Planning:   Living will: No    Durable POA for healthcare: No    Advanced directive: No      PREVENTIVE SCREENINGS      Cardiovascular Screening:    General: Screening Current      Diabetes Screening:     General: Screening Current      Colorectal Cancer Screening:     General: Screening Current      Breast Cancer Screening:     General: Screening Current      Cervical Cancer Screening:    General: Screening Not Indicated      Osteoporosis Screening:    General: Risks and Benefits Discussed      Abdominal Aortic Aneurysm (AAA) Screening:        General: Screening Not Indicated      Lung Cancer Screening:     General: Screening Not Indicated      Hepatitis C Screening:    General: Screening Current    Screening, Brief Intervention, and Referral to Treatment (SBIRT)    Screening  Typical number of drinks in a day: 0  Typical number of drinks in a week: 2  Interpretation: Low risk drinking behavior  AUDIT-C Screenin) How often did you have a drink containing alcohol in the past year? 2 to 4 times a month  2) How many drinks did you have on a typical day when you were drinking in the past year? 1 to 2  3) How often did you have 6 or more drinks on one occasion in the past year? never    AUDIT-C Score: 2  Interpretation: Score 0-2 (female): Negative screen for alcohol misuse    Single Item Drug Screening:  How often have you used an illegal drug (including marijuana) or a prescription medication for non-medical reasons in the past year? never    Single Item Drug Screen Score: 0  Interpretation: Negative screen for possible drug use disorder    No exam data present     Physical Exam:     /60 (BP Location: Left arm, Patient Position: Sitting, Cuff Size: Standard)   Pulse 69   Temp 98 7 °F (37 1 °C)   Resp 13   Ht 5' 1 85" (1 571 m)   Wt 69 4 kg (153 lb)   LMP  (LMP Unknown)   SpO2 100%   BMI 28 12 kg/m²     Physical Exam  Vitals and nursing note reviewed  Constitutional:       Appearance: Normal appearance  HENT:      Head: Normocephalic and atraumatic  Eyes:      Extraocular Movements: Extraocular movements intact  Pupils: Pupils are equal, round, and reactive to light  Cardiovascular:      Rate and Rhythm: Normal rate and regular rhythm  Pulses: Normal pulses  Heart sounds: Normal heart sounds  Pulmonary:      Effort: Pulmonary effort is normal       Breath sounds: Normal breath sounds  Abdominal:      General: Abdomen is flat  Palpations: Abdomen is soft  Musculoskeletal:      Cervical back: Normal range of motion and neck supple  Skin:     Capillary Refill: Capillary refill takes less than 2 seconds     Neurological:      General: No focal deficit present  Mental Status: She is alert and oriented to person, place, and time  Psychiatric:         Mood and Affect: Mood normal          Behavior: Behavior normal          Thought Content:  Thought content normal          Judgment: Judgment normal           Sveta Salazar DO

## 2023-08-30 LAB
ALBUMIN SERPL-MCNC: 3.9 G/DL (ref 3.6–5.1)
ALBUMIN/GLOB SERPL: 1.4 (CALC) (ref 1–2.5)
ALP SERPL-CCNC: 92 U/L (ref 37–153)
ALT SERPL-CCNC: 12 U/L (ref 6–29)
AST SERPL-CCNC: 16 U/L (ref 10–35)
BILIRUB SERPL-MCNC: 0.4 MG/DL (ref 0.2–1.2)
BUN SERPL-MCNC: 21 MG/DL (ref 7–25)
BUN/CREAT SERPL: NORMAL (CALC) (ref 6–22)
CALCIUM SERPL-MCNC: 9.2 MG/DL (ref 8.6–10.4)
CHLORIDE SERPL-SCNC: 102 MMOL/L (ref 98–110)
CHOLEST SERPL-MCNC: 201 MG/DL
CHOLEST/HDLC SERPL: 3.6 (CALC)
CO2 SERPL-SCNC: 31 MMOL/L (ref 20–32)
CREAT SERPL-MCNC: 0.88 MG/DL (ref 0.6–1)
ERYTHROCYTE [DISTWIDTH] IN BLOOD BY AUTOMATED COUNT: 12.6 % (ref 11–15)
GFR/BSA.PRED SERPLBLD CYS-BASED-ARV: 68 ML/MIN/1.73M2
GLOBULIN SER CALC-MCNC: 2.7 G/DL (CALC) (ref 1.9–3.7)
GLUCOSE SERPL-MCNC: 90 MG/DL (ref 65–99)
HCT VFR BLD AUTO: 36.3 % (ref 35–45)
HDLC SERPL-MCNC: 56 MG/DL
HGB BLD-MCNC: 12.3 G/DL (ref 11.7–15.5)
LDLC SERPL CALC-MCNC: 123 MG/DL (CALC)
MCH RBC QN AUTO: 31.1 PG (ref 27–33)
MCHC RBC AUTO-ENTMCNC: 33.9 G/DL (ref 32–36)
MCV RBC AUTO: 91.9 FL (ref 80–100)
NONHDLC SERPL-MCNC: 145 MG/DL (CALC)
PLATELET # BLD AUTO: 231 THOUSAND/UL (ref 140–400)
PMV BLD REES-ECKER: 11.6 FL (ref 7.5–12.5)
POTASSIUM SERPL-SCNC: 4 MMOL/L (ref 3.5–5.3)
PROT SERPL-MCNC: 6.6 G/DL (ref 6.1–8.1)
RBC # BLD AUTO: 3.95 MILLION/UL (ref 3.8–5.1)
SODIUM SERPL-SCNC: 138 MMOL/L (ref 135–146)
T4 FREE SERPL-MCNC: 1.2 NG/DL (ref 0.8–1.8)
TRIGL SERPL-MCNC: 117 MG/DL
TSH SERPL-ACNC: 4.71 MIU/L (ref 0.4–4.5)
WBC # BLD AUTO: 5.2 THOUSAND/UL (ref 3.8–10.8)

## 2023-11-13 DIAGNOSIS — I10 ESSENTIAL HYPERTENSION: ICD-10-CM

## 2023-11-13 RX ORDER — VALSARTAN AND HYDROCHLOROTHIAZIDE 320; 25 MG/1; MG/1
TABLET, FILM COATED ORAL
Qty: 90 TABLET | Refills: 1 | Status: SHIPPED | OUTPATIENT
Start: 2023-11-13

## 2023-12-08 ENCOUNTER — HOSPITAL ENCOUNTER (OUTPATIENT)
Dept: RADIOLOGY | Facility: MEDICAL CENTER | Age: 78
Discharge: HOME | End: 2023-12-08
Payer: COMMERCIAL

## 2023-12-08 VITALS — WEIGHT: 145 LBS | BODY MASS INDEX: 26.68 KG/M2 | HEIGHT: 62 IN

## 2023-12-08 DIAGNOSIS — Z12.31 ENCOUNTER FOR SCREENING MAMMOGRAM FOR MALIGNANT NEOPLASM OF BREAST: ICD-10-CM

## 2023-12-08 PROCEDURE — 77063 BREAST TOMOSYNTHESIS BI: CPT

## 2023-12-08 PROCEDURE — 77067 SCR MAMMO BI INCL CAD: CPT

## 2024-02-21 PROBLEM — Z00.00 MEDICARE ANNUAL WELLNESS VISIT, SUBSEQUENT: Status: RESOLVED | Noted: 2019-04-22 | Resolved: 2024-02-21

## 2024-02-23 DIAGNOSIS — N60.02 CYST OF LEFT BREAST: Primary | ICD-10-CM

## 2024-03-11 ENCOUNTER — HOSPITAL ENCOUNTER (OUTPATIENT)
Dept: ULTRASOUND IMAGING | Facility: CLINIC | Age: 79
Discharge: HOME | End: 2024-03-11
Payer: COMMERCIAL

## 2024-03-11 DIAGNOSIS — N60.02 CYST OF LEFT BREAST: ICD-10-CM

## 2024-03-11 PROCEDURE — 76642 ULTRASOUND BREAST LIMITED: CPT

## 2024-03-14 PROBLEM — N60.02 CYST OF LEFT BREAST: Status: ACTIVE | Noted: 2024-03-14

## 2024-03-15 ENCOUNTER — CONSULT (OUTPATIENT)
Dept: SURGICAL ONCOLOGY | Facility: CLINIC | Age: 79
End: 2024-03-15
Payer: COMMERCIAL

## 2024-03-15 VITALS
HEIGHT: 62 IN | TEMPERATURE: 98.3 F | OXYGEN SATURATION: 97 % | BODY MASS INDEX: 27.6 KG/M2 | HEART RATE: 78 BPM | WEIGHT: 150 LBS | DIASTOLIC BLOOD PRESSURE: 72 MMHG | SYSTOLIC BLOOD PRESSURE: 142 MMHG

## 2024-03-15 DIAGNOSIS — N60.02 CYST OF LEFT BREAST: Primary | ICD-10-CM

## 2024-03-15 PROCEDURE — 99202 OFFICE O/P NEW SF 15 MIN: CPT | Performed by: SURGERY

## 2024-03-15 RX ORDER — AZITHROMYCIN 250 MG/1
TABLET, FILM COATED ORAL
COMMUNITY
Start: 2023-12-24

## 2024-03-15 NOTE — PROGRESS NOTES
Surgical Oncology Consult       [facility] [place]  [address] [facility] [address]  [phone]    Luis Kelly  1945  [id number]  [facility] [place]  [address] [facility] [address]  [phone]    Diagnoses and all orders for this visit:    Cyst of left breast    Other orders  -     azithromycin (ZITHROMAX) 250 mg tablet; TAKE 2 TABLETS BY MOUTH TODAY, THEN TAKE 1 TABLET DAILY FOR 4 DAYS AS DIRECTED        Chief Complaint   Patient presents with    Consult       No follow-ups on file.    Oncology History    No history exists.       History of Present Illness: 78-year-old female with a history of benign breast cysts and excisions in the past.  Screening mammogram from December 8, 2023 revealed no suspicious masses and was BI-RADS 2.  She then started to feel a mass in the left breast and pain.  After a week, the pain disappeared but she still felt a mass.  Follow-up left breast ultrasound on March 11, 2024 revealed an 8 mm simple cyst at 2:00 of the left breast, 5 cm from the nipple.  I personally reviewed the films.  There is no family history of breast cancer.    Review of Systems  Complete ROS Surg Onc:   Constitutional: The patient denies new or recent history of general fatigue, no recent weight loss, no change in appetite.   Eyes: No complaints of visual problems, no scleral icterus.   ENT: no complaints of ear pain, no hoarseness, no difficulty swallowing,  no tinnitus and no new masses in head, oral cavity, or neck.   Cardiovascular: No complaints of chest pain, no palpitations, no ankle edema.   Respiratory: No complaints of shortness of breath, no cough.   Gastrointestinal: No complaints of jaundice, no bloody stools, no pale stools.   Genitourinary: No complaints of dysuria, no hematuria, no nocturia, no frequent urination, no urethral  discharge.   Musculoskeletal: No complaints of weakness, paralysis, joint stiffness or arthralgias.  Integumentary: No complaints of rash, no new lesions.   Neurological: No complaints of convulsions, no seizures, no dizziness.   Hematologic/Lymphatic: No complaints of easy bruising.   Endocrine:  No hot or cold intolerance.  No polydipsia, polyphagia, or polyuria.  Allergy/immunology:  No environmental allergies.  No food allergies.  Not immunocompromised.  Skin:  No pallor or rash.  No wound.          Patient Active Problem List   Diagnosis    History of basal cell carcinoma (BCC) of skin    Diverticulosis    Hypertension    Osteopenia    Palpitations    Rosacea    Subclinical hypothyroidism    Vitamin D deficiency    Episodic lightheadedness    Increased urinary frequency    Urge incontinence of urine    GERD without esophagitis    Mild hyperlipidemia    Cyst of left breast     Past Medical History:   Diagnosis Date    Basal cell carcinoma 1991    Hypertension     Osteoporosis     Rosacea     Skin tag     Varicella      Past Surgical History:   Procedure Laterality Date    ADENOIDECTOMY      BREAST CYST EXCISION Left 1989    BREAST CYST EXCISION Left 1989    BREAST CYST EXCISION Right 1980    HEMORRHOID SURGERY      INCISIONAL BREAST BIOPSY Left     OTHER SURGICAL HISTORY      Arthrocentesis Aspiration of Ganglion Cyst    TONSILLECTOMY      UTERINE FIBROID EMBOLIZATION       Family History   Problem Relation Age of Onset    Hypertension Mother     Coronary artery disease Father     Diverticulitis Father         Colon    Stroke Father         Syndrome    Venous thrombosis Father         of the deep Vessels of the distal lower extremity    Diabetes Brother     Lung cancer Brother 50    Venous thrombosis Brother         of the deep vessels of the distal Lower extremity    Melanoma Brother     Diabetes Paternal Grandfather     Lung cancer Family         Adenocarcinoma of the Lung    Other Family         Disorders of  Blood and Blood-forming Organs, Skin Disorder    Heart disease Family     Colon cancer Paternal Aunt     No Known Problems Daughter     No Known Problems Maternal Grandmother     No Known Problems Maternal Grandfather     No Known Problems Paternal Grandmother      Social History     Socioeconomic History    Marital status: /Civil Union     Spouse name: Not on file    Number of children: Not on file    Years of education: Not on file    Highest education level: Not on file   Occupational History    Not on file   Tobacco Use    Smoking status: Never    Smokeless tobacco: Never   Vaping Use    Vaping status: Never Used   Substance and Sexual Activity    Alcohol use: Yes     Comment: social drinker    Drug use: No    Sexual activity: Yes     Partners: Male     Birth control/protection: Post-menopausal   Other Topics Concern    Not on file   Social History Narrative    Caffeine use    Exercise frequency      Social Determinants of Health     Financial Resource Strain: Patient Declined (6/1/2023)    Overall Financial Resource Strain (CARDIA)     Difficulty of Paying Living Expenses: Patient declined   Food Insecurity: Not on file   Transportation Needs: No Transportation Needs (6/1/2023)    PRAPARE - Transportation     Lack of Transportation (Medical): No     Lack of Transportation (Non-Medical): No   Physical Activity: Not on file   Stress: Not on file   Social Connections: Not on file   Intimate Partner Violence: Not on file   Housing Stability: Not on file       Current Outpatient Medications:     aspirin 81 MG tablet, Take 1 tablet by mouth daily, Disp: , Rfl:     azithromycin (ZITHROMAX) 250 mg tablet, TAKE 2 TABLETS BY MOUTH TODAY, THEN TAKE 1 TABLET DAILY FOR 4 DAYS AS DIRECTED, Disp: , Rfl:     Cholecalciferol (VITAMIN D3) 2000 units capsule, Take 1 tablet by mouth daily, Disp: , Rfl:     ketoconazole (NIZORAL) 2 % cream, Apply topically daily Twice a day to affected areas on forehead when having an active  flare and twice a week for maintenance., Disp: 60 g, Rfl: 2    Magnesium 250 MG TABS, Take 250 tablets by mouth daily as needed, Disp: , Rfl:     metroNIDAZOLE (METROGEL) 0.75 % gel, Apply topically 2 (two) times a day, Disp: 45 g, Rfl: 5    Multiple Vitamin-Folic Acid TABS, Take 1 tablet by mouth daily, Disp: , Rfl:     Psyllium 28.3 % POWD, Take by mouth, Disp: , Rfl:     valsartan-hydrochlorothiazide (DIOVAN-HCT) 320-25 MG per tablet, TAKE 1/2 TABLETS BY MOUTH IN THE MORNING AND 1/2 TABLETS IN THE EVENING., Disp: 90 tablet, Rfl: 1    solifenacin (VESICARE) 10 MG tablet, Take 1 tablet (10 mg total) by mouth daily, Disp: 90 tablet, Rfl: 3  Allergies   Allergen Reactions    Tetanus Antitoxin Hives    Penicillins Rash     There were no vitals filed for this visit.    Physical Exam   Constitutional: General appearance: The Patient is well-developed and well-nourished who appears the stated age in no acute distress. Patient is pleasant and talkative.     HEENT:  Normocephalic.  Sclerae are anicteric. Mucous membranes are moist.   Abdomen: Abdomen is soft, non-tender, non-distended and without masses.     Extremities: There is no clubbing or cyanosis. There is no edema.  Symmetric.  Neuro: Grossly nonfocal. Gait is normal.     Lymphatic: No evidence of axillary adenopathy on the left  Skin: Warm, anicteric.    Psych:  Patient is pleasant and talkative.  Breasts: Symmetric.  No dominant masses, skin changes or nipple discharge on the left.  No axillary or supraclavicular adenopathy on the left.      Pathology:  [unfilled]    Labs:      Imaging  US breast left limited (diagnostic)    Result Date: 3/11/2024  Narrative: DIAGNOSIS: Cyst of left breast, palpable area TECHNIQUE: Ultrasound of the left breast(s) was performed. COMPARISONS: Prior breast imaging dated: 12/08/2023, 12/06/2022, 10/15/2021, 10/04/2021, 06/26/2020, 05/09/2019, 05/07/2018, 05/04/2017, 05/02/2016, 04/21/2015, and 04/14/2014 RELEVANT HISTORY: Family  Breast Cancer History: No known family history of breast cancer. Family Medical History: Family medical history includes colon cancer in paternal aunt. Personal History: No known relevant hormone history. Surgical history includes breast excisional biopsy. No known relevant medical history. RISK ASSESSMENT: 5 Year Tyrer-Cuzick: 2.49% 10 Year Tyrer-Cuzick: No Score Lifetime Tyrer-Cuzick: 4.1% INDICATION: Luis Kelly is a 78 y.o. female presenting for enlarged breast, painful cysts. FINDINGS: Sonographic exam of the left breast in the area of palpable abnormality in the right breast at 2:00, 5 cm from the nipple shows a simple cyst measuring 8 x 5 x 8 mm, minimally larger compared to the prior exam.     Impression:  Simple cyst corresponding to the area of clinical concern at 2:00. ASSESSMENT/BI-RADS CATEGORY: Left: 2 - Benign Overall: 2 - Benign RECOMMENDATION:      - Return to routine screening for both breasts. Workstation ID: [id number]    I personally reviewed and interpreted the above laboratory and imaging data.    Discussion/Summary: 78-year-old female with a history of breast cyst.  By imaging she has a subcentimeter left breast cyst.  This appears completely benign.  The mass she feels, it just appears to be thickening in the breast.  I do not feel any dominant masses or anything worrisome for breast cancer.  Since the pain has resolved, I would recommend continued observation.  If she notices any increase in pain or any increase in this thickening she can contact us.  She will continue with her regular screening imaging.  I will see her again in the future should the need arise.  She is agreeable to this.  All her questions were answered.

## 2024-05-07 DIAGNOSIS — I10 ESSENTIAL HYPERTENSION: ICD-10-CM

## 2024-05-07 DIAGNOSIS — N39.41 URGE INCONTINENCE OF URINE: ICD-10-CM

## 2024-05-07 RX ORDER — VALSARTAN AND HYDROCHLOROTHIAZIDE 320; 25 MG/1; MG/1
TABLET, FILM COATED ORAL
Qty: 90 TABLET | Refills: 1 | Status: SHIPPED | OUTPATIENT
Start: 2024-05-07

## 2024-05-07 RX ORDER — SOLIFENACIN SUCCINATE 10 MG/1
10 TABLET, FILM COATED ORAL DAILY
Qty: 90 TABLET | Refills: 1 | Status: SHIPPED | OUTPATIENT
Start: 2024-05-07

## 2024-06-10 ENCOUNTER — OFFICE VISIT (OUTPATIENT)
Dept: FAMILY MEDICINE CLINIC | Facility: CLINIC | Age: 79
End: 2024-06-10
Payer: COMMERCIAL

## 2024-06-10 VITALS
TEMPERATURE: 98.2 F | HEART RATE: 63 BPM | OXYGEN SATURATION: 100 % | WEIGHT: 148.4 LBS | RESPIRATION RATE: 16 BRPM | SYSTOLIC BLOOD PRESSURE: 122 MMHG | DIASTOLIC BLOOD PRESSURE: 76 MMHG | HEIGHT: 62 IN | BODY MASS INDEX: 27.31 KG/M2

## 2024-06-10 DIAGNOSIS — E78.5 MILD HYPERLIPIDEMIA: ICD-10-CM

## 2024-06-10 DIAGNOSIS — E03.8 SUBCLINICAL HYPOTHYROIDISM: ICD-10-CM

## 2024-06-10 DIAGNOSIS — Z00.00 MEDICARE ANNUAL WELLNESS VISIT, SUBSEQUENT: Primary | ICD-10-CM

## 2024-06-10 DIAGNOSIS — K21.9 GERD WITHOUT ESOPHAGITIS: ICD-10-CM

## 2024-06-10 DIAGNOSIS — I10 PRIMARY HYPERTENSION: ICD-10-CM

## 2024-06-10 DIAGNOSIS — Z12.31 ENCOUNTER FOR SCREENING MAMMOGRAM FOR MALIGNANT NEOPLASM OF BREAST: ICD-10-CM

## 2024-06-10 DIAGNOSIS — L71.9 ROSACEA: ICD-10-CM

## 2024-06-10 DIAGNOSIS — H91.93 DECREASED HEARING OF BOTH EARS: ICD-10-CM

## 2024-06-10 DIAGNOSIS — E55.9 VITAMIN D DEFICIENCY: ICD-10-CM

## 2024-06-10 DIAGNOSIS — N39.41 URGE INCONTINENCE OF URINE: ICD-10-CM

## 2024-06-10 PROCEDURE — G0439 PPPS, SUBSEQ VISIT: HCPCS | Performed by: FAMILY MEDICINE

## 2024-06-10 PROCEDURE — 99214 OFFICE O/P EST MOD 30 MIN: CPT | Performed by: FAMILY MEDICINE

## 2024-06-10 NOTE — PATIENT INSTRUCTIONS
Medicare Preventive Visit Patient Instructions  Thank you for completing your Welcome to Medicare Visit or Medicare Annual Wellness Visit today. Your next wellness visit will be due in one year (6/11/2025).  The screening/preventive services that you may require over the next 5-10 years are detailed below. Some tests may not apply to you based off risk factors and/or age. Screening tests ordered at today's visit but not completed yet may show as past due. Also, please note that scanned in results may not display below.  Preventive Screenings:  Service Recommendations Previous Testing/Comments   Colorectal Cancer Screening  * Colonoscopy    * Fecal Occult Blood Test (FOBT)/Fecal Immunochemical Test (FIT)  * Fecal DNA/Cologuard Test  * Flexible Sigmoidoscopy Age: 45-75 years old   Colonoscopy: every 10 years (may be performed more frequently if at higher risk)  OR  FOBT/FIT: every 1 year  OR  Cologuard: every 3 years  OR  Sigmoidoscopy: every 5 years  Screening may be recommended earlier than age 45 if at higher risk for colorectal cancer. Also, an individualized decision between you and your healthcare provider will decide whether screening between the ages of 76-85 would be appropriate. Colonoscopy: 04/06/2017  FOBT/FIT: Not on file  Cologuard: Not on file  Sigmoidoscopy: Not on file          Breast Cancer Screening Age: 40+ years old  Frequency: every 1-2 years  Not required if history of left and right mastectomy Mammogram: 12/08/2023    Screening Current   Cervical Cancer Screening Between the ages of 21-29, pap smear recommended once every 3 years.   Between the ages of 30-65, can perform pap smear with HPV co-testing every 5 years.   Recommendations may differ for women with a history of total hysterectomy, cervical cancer, or abnormal pap smears in past. Pap Smear: 09/13/2021    Screening Not Indicated   Hepatitis C Screening Once for adults born between 1945 and 1965  More frequently in patients at high risk  for Hepatitis C Hep C Antibody: 08/12/2019    Screening Current   Diabetes Screening 1-2 times per year if you're at risk for diabetes or have pre-diabetes Fasting glucose: No results in last 5 years (No results in last 5 years)  A1C: No results in last 5 years (No results in last 5 years)  Screening Current   Cholesterol Screening Once every 5 years if you don't have a lipid disorder. May order more often based on risk factors. Lipid panel: 08/29/2023    Screening Not Indicated  History Lipid Disorder     Other Preventive Screenings Covered by Medicare:  Abdominal Aortic Aneurysm (AAA) Screening: covered once if your at risk. You're considered to be at risk if you have a family history of AAA.  Lung Cancer Screening: covers low dose CT scan once per year if you meet all of the following conditions: (1) Age 55-77; (2) No signs or symptoms of lung cancer; (3) Current smoker or have quit smoking within the last 15 years; (4) You have a tobacco smoking history of at least 20 pack years (packs per day multiplied by number of years you smoked); (5) You get a written order from a healthcare provider.  Glaucoma Screening: covered annually if you're considered high risk: (1) You have diabetes OR (2) Family history of glaucoma OR (3)  aged 50 and older OR (4)  American aged 65 and older  Osteoporosis Screening: covered every 2 years if you meet one of the following conditions: (1) You're estrogen deficient and at risk for osteoporosis based off medical history and other findings; (2) Have a vertebral abnormality; (3) On glucocorticoid therapy for more than 3 months; (4) Have primary hyperparathyroidism; (5) On osteoporosis medications and need to assess response to drug therapy.   Last bone density test (DXA Scan): 12/06/2022.  HIV Screening: covered annually if you're between the age of 15-65. Also covered annually if you are younger than 15 and older than 65 with risk factors for HIV infection. For  pregnant patients, it is covered up to 3 times per pregnancy.    Immunizations:  Immunization Recommendations   Influenza Vaccine Annual influenza vaccination during flu season is recommended for all persons aged >= 6 months who do not have contraindications   Pneumococcal Vaccine   * Pneumococcal conjugate vaccine = PCV13 (Prevnar 13), PCV15 (Vaxneuvance), PCV20 (Prevnar 20)  * Pneumococcal polysaccharide vaccine = PPSV23 (Pneumovax) Adults 19-63 yo with certain risk factors or if 65+ yo  If never received any pneumonia vaccine: recommend Prevnar 20 (PCV20)  Give PCV20 if previously received 1 dose of PCV13 or PPSV23   Hepatitis B Vaccine 3 dose series if at intermediate or high risk (ex: diabetes, end stage renal disease, liver disease)   Respiratory syncytial virus (RSV) Vaccine - COVERED BY MEDICARE PART D  * RSVPreF3 (Arexvy) CDC recommends that adults 60 years of age and older may receive a single dose of RSV vaccine using shared clinical decision-making (SCDM)   Tetanus (Td) Vaccine - COST NOT COVERED BY MEDICARE PART B Following completion of primary series, a booster dose should be given every 10 years to maintain immunity against tetanus. Td may also be given as tetanus wound prophylaxis.   Tdap Vaccine - COST NOT COVERED BY MEDICARE PART B Recommended at least once for all adults. For pregnant patients, recommended with each pregnancy.   Shingles Vaccine (Shingrix) - COST NOT COVERED BY MEDICARE PART B  2 shot series recommended in those 19 years and older who have or will have weakened immune systems or those 50 years and older     Health Maintenance Due:      Topic Date Due   • Breast Cancer Screening: Mammogram  12/08/2024   • Hepatitis C Screening  Completed   • Colorectal Cancer Screening  Discontinued     Immunizations Due:      Topic Date Due   • COVID-19 Vaccine (5 - 2023-24 season) 09/01/2023     Advance Directives   What are advance directives?  Advance directives are legal documents that state  your wishes and plans for medical care. These plans are made ahead of time in case you lose your ability to make decisions for yourself. Advance directives can apply to any medical decision, such as the treatments you want, and if you want to donate organs.   What are the types of advance directives?  There are many types of advance directives, and each state has rules about how to use them. You may choose a combination of any of the following:  Living will:  This is a written record of the treatment you want. You can also choose which treatments you do not want, which to limit, and which to stop at a certain time. This includes surgery, medicine, IV fluid, and tube feedings.   Durable power of  for healthcare (DPAHC):  This is a written record that states who you want to make healthcare choices for you when you are unable to make them for yourself. This person, called a proxy, is usually a family member or a friend. You may choose more than 1 proxy.  Do not resuscitate (DNR) order:  A DNR order is used in case your heart stops beating or you stop breathing. It is a request not to have certain forms of treatment, such as CPR. A DNR order may be included in other types of advance directives.  Medical directive:  This covers the care that you want if you are in a coma, near death, or unable to make decisions for yourself. You can list the treatments you want for each condition. Treatment may include pain medicine, surgery, blood transfusions, dialysis, IV or tube feedings, and a ventilator (breathing machine).  Values history:  This document has questions about your views, beliefs, and how you feel and think about life. This information can help others choose the care that you would choose.  Why are advance directives important?  An advance directive helps you control your care. Although spoken wishes may be used, it is better to have your wishes written down. Spoken wishes can be misunderstood, or not  followed. Treatments may be given even if you do not want them. An advance directive may make it easier for your family to make difficult choices about your care.   Urinary Incontinence   Urinary incontinence (UI)  is when you lose control of your bladder. UI develops because your bladder cannot store or empty urine properly. The 3 most common types of UI are stress incontinence, urge incontinence, or both.  Medicines:   May be given to help strengthen your bladder control. Report any side effects of medication to your healthcare provider.  Do pelvic muscle exercises often:  Your pelvic muscles help you stop urinating. Squeeze these muscles tight for 5 seconds, then relax for 5 seconds. Gradually work up to squeezing for 10 seconds. Do 3 sets of 15 repetitions a day, or as directed. This will help strengthen your pelvic muscles and improve bladder control.  Train your bladder:  Go to the bathroom at set times, such as every 2 hours, even if you do not feel the urge to go. You can also try to hold your urine when you feel the urge to go. For example, hold your urine for 5 minutes when you feel the urge to go. As that becomes easier, hold your urine for 10 minutes.   Self-care:   Keep a UI record.  Write down how often you leak urine and how much you leak. Make a note of what you were doing when you leaked urine.  Drink liquids as directed. You may need to limit the amount of liquid you drink to help control your urine leakage. Do not drink any liquid right before you go to bed. Limit or do not have drinks that contain caffeine or alcohol.   Prevent constipation.  Eat a variety of high-fiber foods. Good examples are high-fiber cereals, beans, vegetables, and whole-grain breads. Walking is the best way to trigger your intestines to have a bowel movement.  Exercise regularly and maintain a healthy weight.  Weight loss and exercise will decrease pressure on your bladder and help you control your leakage.   Use a catheter  as directed  to help empty your bladder. A catheter is a tiny, plastic tube that is put into your bladder to drain your urine.   Go to behavior therapy as directed.  Behavior therapy may be used to help you learn to control your urge to urinate.    Weight Management   Why it is important to manage your weight:  Being overweight increases your risk of health conditions such as heart disease, high blood pressure, type 2 diabetes, and certain types of cancer. It can also increase your risk for osteoarthritis, sleep apnea, and other respiratory problems. Aim for a slow, steady weight loss. Even a small amount of weight loss can lower your risk of health problems.  How to lose weight safely:  A safe and healthy way to lose weight is to eat fewer calories and get regular exercise. You can lose up about 1 pound a week by decreasing the number of calories you eat by 500 calories each day.   Healthy meal plan for weight management:  A healthy meal plan includes a variety of foods, contains fewer calories, and helps you stay healthy. A healthy meal plan includes the following:  Eat whole-grain foods more often.  A healthy meal plan should contain fiber. Fiber is the part of grains, fruits, and vegetables that is not broken down by your body. Whole-grain foods are healthy and provide extra fiber in your diet. Some examples of whole-grain foods are whole-wheat breads and pastas, oatmeal, brown rice, and bulgur.  Eat a variety of vegetables every day.  Include dark, leafy greens such as spinach, kale, orlin greens, and mustard greens. Eat yellow and orange vegetables such as carrots, sweet potatoes, and winter squash.   Eat a variety of fruits every day.  Choose fresh or canned fruit (canned in its own juice or light syrup) instead of juice. Fruit juice has very little or no fiber.  Eat low-fat dairy foods.  Drink fat-free (skim) milk or 1% milk. Eat fat-free yogurt and low-fat cottage cheese. Try low-fat cheeses such as  mozzarella and other reduced-fat cheeses.  Choose meat and other protein foods that are low in fat.  Choose beans or other legumes such as split peas or lentils. Choose fish, skinless poultry (chicken or turkey), or lean cuts of red meat (beef or pork). Before you cook meat or poultry, cut off any visible fat.   Use less fat and oil.  Try baking foods instead of frying them. Add less fat, such as margarine, sour cream, regular salad dressing and mayonnaise to foods. Eat fewer high-fat foods. Some examples of high-fat foods include french fries, doughnuts, ice cream, and cakes.  Eat fewer sweets.  Limit foods and drinks that are high in sugar. This includes candy, cookies, regular soda, and sweetened drinks.  Exercise:  Exercise at least 30 minutes per day on most days of the week. Some examples of exercise include walking, biking, dancing, and swimming. You can also fit in more physical activity by taking the stairs instead of the elevator or parking farther away from stores. Ask your healthcare provider about the best exercise plan for you.      © Copyright [employer] 2018 Information is for End User's use only and may not be sold, redistributed or otherwise used for commercial purposes. All illustrations and images included in CareNotes® are the copyrighted property of [employer] Inc[employer] or [employer]

## 2024-06-10 NOTE — PROGRESS NOTES
Ambulatory Visit  Name: Luis Kelly      : 1945      MRN: [id number]  Encounter Provider: Shannon Reis DO  Encounter Date: 6/10/2024   Encounter department: [facility]    Assessment & Plan   1. Medicare annual wellness visit, subsequent  Assessment & Plan:  Mammogram ordered  2. Mild hyperlipidemia  -     Comprehensive metabolic panel; Future; Expected date: 06/10/2024  -     CBC; Future; Expected date: 06/10/2024  -     Lipid Panel with Direct LDL reflex; Future; Expected date: 06/10/2024  -     Comprehensive metabolic panel  -     CBC  -     Lipid Panel with Direct LDL reflex  3. Subclinical hypothyroidism  -     TSH, 3rd generation with Free T4 reflex; Future; Expected date: 06/10/2024  -     TSH, 3rd generation with Free T4 reflex  4. Vitamin D deficiency  -     Vitamin D 25 hydroxy; Future; Expected date: 06/10/2024  -     Vitamin D 25 hydroxy  5. Primary hypertension  Assessment & Plan:  Stable on current meds  6. Urge incontinence of urine  Assessment & Plan:  Stable on vesicare  7. Rosacea  Assessment & Plan:  Using metrogel  8. Encounter for screening mammogram for malignant neoplasm of breast  -     Mammo screening bilateral w 3d & cad; Future  9. Decreased hearing of both ears  -     Ambulatory Referral to Audiology; Future  10. GERD without esophagitis  Assessment & Plan:  Pepcid as needed      Depression Screening and Follow-up Plan: Patient was screened for depression during today's encounter. They screened negative with a PHQ-2 score of 0.      Preventive health issues were discussed with patient, and age appropriate screening tests were ordered as noted in patient's After Visit Summary. Personalized health advice and appropriate referrals for health education or preventive services given if needed, as noted in patient's After Visit Summary.    History of Present Illness     Here for wellness  Doing well  Feeling good  Had cyst on breast  Had root cancal- reaction  to dexamethasone  Didn't do vas screening    Hypertension  This is a chronic problem. The current episode started more than 1 year ago. The problem is unchanged. Past treatments include angiotensin blockers. The current treatment provides significant improvement.      Patient Care Team:  Shannon Reis DO as PCP - MD Severo Espitia DO    Review of Systems   Constitutional: Negative.    HENT: Negative.     Eyes: Negative.    Respiratory: Negative.     Cardiovascular: Negative.    Gastrointestinal: Negative.    Endocrine: Negative.    Genitourinary: Negative.    Musculoskeletal: Negative.    Skin: Negative.    Allergic/Immunologic: Negative.    Neurological: Negative.    Hematological: Negative.    Psychiatric/Behavioral: Negative.       Medical History Reviewed by provider this encounter:       Annual Wellness Visit Questionnaire   Luis is here for her Subsequent Wellness visit.     Health Risk Assessment:   Patient rates overall health as very good. Patient feels that their physical health rating is same. Patient is very satisfied with their life. Eyesight was rated as same. Hearing was rated as same. Patient feels that their emotional and mental health rating is same. Patients states they are never, rarely angry. Patient states they are never, rarely unusually tired/fatigued. Pain experienced in the last 7 days has been none. Patient states that she has experienced no weight loss or gain in last 6 months.     Depression Screening:   PHQ-2 Score: 0      Fall Risk Screening:   In the past year, patient has experienced: no history of falling in past year      Urinary Incontinence Screening:   Patient has not leaked urine accidently in the last six months. I take solifenicin    Home Safety:  Patient does not have trouble with stairs inside or outside of their home. Patient has working smoke alarms and has no working carbon monoxide detector. Home safety hazards include: none.     Nutrition:    Current diet is Low Cholesterol.     Medications:   Patient is currently taking over-the-counter supplements. OTC medications include: see medication list. Patient is able to manage medications.     Activities of Daily Living (ADLs)/Instrumental Activities of Daily Living (IADLs):   Walk and transfer into and out of bed and chair?: Yes  Dress and groom yourself?: Yes    Bathe or shower yourself?: Yes    Feed yourself? Yes  Do your laundry/housekeeping?: Yes  Manage your money, pay your bills and track your expenses?: Yes  Make your own meals?: Yes    Do your own shopping?: Yes    Previous Hospitalizations:   Any hospitalizations or ED visits within the last 12 months?: No      Advance Care Planning:   Living will: Yes    Durable POA for healthcare: Yes    Advanced directive: Yes      Cognitive Screening:   Provider or family/friend/caregiver concerned regarding cognition?: No    PREVENTIVE SCREENINGS      Cardiovascular Screening:    General: Screening Not Indicated and History Lipid Disorder      Diabetes Screening:     General: Screening Current      Breast Cancer Screening:     General: Screening Current      Cervical Cancer Screening:    General: Screening Not Indicated      Lung Cancer Screening:     General: Screening Not Indicated      Hepatitis C Screening:    General: Screening Current    Screening, Brief Intervention, and Referral to Treatment (SBIRT)    Screening  Typical number of drinks in a day: 0  Typical number of drinks in a week: 0  Interpretation: Low risk drinking behavior.    AUDIT-C Screenin) How often did you have a drink containing alcohol in the past year? monthly or less  2) How many drinks did you have on a typical day when you were drinking in the past year? 1 to 2  3) How often did you have 6 or more drinks on one occasion in the past year? never    AUDIT-C Score: 1  Interpretation: Score 0-2 (female): Negative screen for alcohol misuse    Single Item Drug Screening:  How often  "have you used an illegal drug (including marijuana) or a prescription medication for non-medical reasons in the past year? never    Single Item Drug Screen Score: 0  Interpretation: Negative screen for possible drug use disorder    Social Determinants of Health     Financial Resource Strain: Patient Declined (6/1/2023)    Overall Financial Resource Strain (CARDIA)    • Difficulty of Paying Living Expenses: Patient declined   Food Insecurity: No Food Insecurity (6/10/2024)    Hunger Vital Sign    • Worried About Running Out of Food in the Last Year: Never true    • Ran Out of Food in the Last Year: Never true   Transportation Needs: No Transportation Needs (6/10/2024)    PRAPARE - Transportation    • Lack of Transportation (Medical): No    • Lack of Transportation (Non-Medical): No   Housing Stability: Low Risk  (6/10/2024)    Housing Stability Vital Sign    • Unable to Pay for Housing in the Last Year: No    • Number of Times Moved in the Last Year: 0    • Homeless in the Last Year: No   Utilities: Not At Risk (6/10/2024)    [facility] Utilities    • Threatened with loss of utilities: No     No results found.    Objective     /76 (BP Location: Left arm, Patient Position: Sitting, Cuff Size: Standard)   Pulse 63   Temp 98.2 °F (36.8 °C) (Tympanic)   Resp 16   Ht 5' 1.69\" (1.567 m)   Wt 67.3 kg (148 lb 6.4 oz)   LMP  (LMP Unknown)   SpO2 100%   BMI 27.41 kg/m²     Physical Exam  Vitals and nursing note reviewed.   Constitutional:       Appearance: Normal appearance. She is well-developed.   HENT:      Head: Normocephalic and atraumatic.      Right Ear: External ear normal.      Left Ear: External ear normal.      Nose: Nose normal.   Eyes:      Extraocular Movements: Extraocular movements intact.      Conjunctiva/sclera: Conjunctivae normal.      Pupils: Pupils are equal, round, and reactive to light.   Cardiovascular:      Rate and Rhythm: Normal rate and regular rhythm.      Heart sounds: Normal heart sounds. "   Pulmonary:      Effort: Pulmonary effort is normal.      Breath sounds: Normal breath sounds.   Abdominal:      General: Bowel sounds are normal.      Palpations: Abdomen is soft.   Musculoskeletal:         General: Normal range of motion.      Cervical back: Normal range of motion and neck supple.   Skin:     General: Skin is warm and dry.      Capillary Refill: Capillary refill takes less than 2 seconds.   Neurological:      General: No focal deficit present.      Mental Status: She is alert and oriented to person, place, and time.   Psychiatric:         Mood and Affect: Mood normal.         Behavior: Behavior normal.         Thought Content: Thought content normal.         Judgment: Judgment normal.       Administrative Statements

## 2024-07-08 ENCOUNTER — TELEPHONE (OUTPATIENT)
Dept: FAMILY MEDICINE CLINIC | Facility: CLINIC | Age: 79
End: 2024-07-08

## 2024-07-08 NOTE — TELEPHONE ENCOUNTER
I spoke to patient regarding preventive vs problem visits form and will be sending,  and will be requesting a claim resubmission.

## 2024-07-10 PROBLEM — Z00.00 MEDICARE ANNUAL WELLNESS VISIT, SUBSEQUENT: Status: RESOLVED | Noted: 2019-04-22 | Resolved: 2024-07-10

## 2024-07-30 DIAGNOSIS — I10 ESSENTIAL HYPERTENSION: ICD-10-CM

## 2024-07-30 DIAGNOSIS — N39.41 URGE INCONTINENCE OF URINE: ICD-10-CM

## 2024-07-30 NOTE — TELEPHONE ENCOUNTER
Message sent via [employer].   Dr. Reis, if you recall, I did not need refills when I was in for my last visit. I am now at a point where I will need both solifenacin 10 and valsartan 320/25 (90 days supply on both with three refills to cover me until my next visit). Thank you.

## 2024-07-31 RX ORDER — VALSARTAN AND HYDROCHLOROTHIAZIDE 320; 25 MG/1; MG/1
1 TABLET, FILM COATED ORAL DAILY
Qty: 90 TABLET | Refills: 3 | Status: SHIPPED | OUTPATIENT
Start: 2024-07-31

## 2024-07-31 RX ORDER — SOLIFENACIN SUCCINATE 10 MG/1
10 TABLET, FILM COATED ORAL DAILY
Qty: 90 TABLET | Refills: 3 | Status: SHIPPED | OUTPATIENT
Start: 2024-07-31

## 2024-07-31 NOTE — TELEPHONE ENCOUNTER
Refill must be reviewed and completed by the office or provider. The refill is unable to be approved or denied by the medication management team.      Patient requesting 90D supply with 3 refills, rx refill department not authorized to provide 90d supply with 3 refills.  Please review to see if the refill is appropriate.

## 2024-08-05 ENCOUNTER — OFFICE VISIT (OUTPATIENT)
Dept: CARDIOLOGY CLINIC | Facility: CLINIC | Age: 79
End: 2024-08-05
Payer: COMMERCIAL

## 2024-08-05 VITALS
HEART RATE: 66 BPM | WEIGHT: 149 LBS | OXYGEN SATURATION: 99 % | HEIGHT: 62 IN | BODY MASS INDEX: 27.42 KG/M2 | SYSTOLIC BLOOD PRESSURE: 148 MMHG | DIASTOLIC BLOOD PRESSURE: 80 MMHG

## 2024-08-05 DIAGNOSIS — R00.2 PALPITATIONS: Primary | ICD-10-CM

## 2024-08-05 DIAGNOSIS — I10 HYPERTENSION, UNSPECIFIED TYPE: ICD-10-CM

## 2024-08-05 DIAGNOSIS — R42 EPISODIC LIGHTHEADEDNESS: ICD-10-CM

## 2024-08-05 PROCEDURE — 99214 OFFICE O/P EST MOD 30 MIN: CPT | Performed by: INTERNAL MEDICINE

## 2024-08-05 PROCEDURE — 93000 ELECTROCARDIOGRAM COMPLETE: CPT | Performed by: INTERNAL MEDICINE

## 2024-08-05 NOTE — PROGRESS NOTES
"EPS Progress Note - Luis Kelly 79 y.o. female MRN: [id number]           ASSESSMENT:  1. Palpitations  POCT ECG      2. Hypertension, unspecified type  POCT ECG      3. Episodic lightheadedness  POCT ECG               PLAN:  1. History of intermittent palpitations  Continues to have a very rare palpitations that sounds like just 1 beat that described as a heartbeat that occasionally causes lightheadedness however she states she has not come close to passing out would let me know right away if she did.  She states that the symptoms are stable and generally occur when she has not had enough to eat or drink     2. Hypertension  Well-controlled continue current medication    3. History presyncope she states no significant episodes where she has really felt like she is going to go down        HPI:   Luis Kelly is a 79 y.o. female who presents to the office today for a 1 year follow up.    Patient reports occasional palpitations.  Lasts less than a minute.  Usually just a single beat described as very forceful in nature that will make her lightheaded for 1 to 3 seconds but no no episodes of pre-syncope.       ROS:   Review of Systems   Constitutional: Negative.   HENT: Negative.     Eyes:  Negative for blurred vision and double vision.   Cardiovascular:  Positive for palpitations. Negative for chest pain, dyspnea on exertion, near-syncope and syncope.   Respiratory:  Negative for cough, shortness of breath and wheezing.    Endocrine: Negative.    Hematologic/Lymphatic: Negative.    Skin: Negative.    Musculoskeletal: Negative.    Gastrointestinal: Negative.    Genitourinary: Negative.    Neurological: Negative.    Psychiatric/Behavioral: Negative.     Allergic/Immunologic: Negative.           Objective:     Vitals: Blood pressure 148/80, pulse 66, height 5' 1.69\" (1.567 m), weight 67.6 kg (149 lb), SpO2 99%, not currently breastfeeding., Body mass index is 27.53 kg/m².,        Physical Exam:    GEN: Luis DAVIES" Leticia appears well, alert and oriented x 3, pleasant and cooperative   HEENT: pupils equal, round, and reactive to light; extraocular muscles intact  NECK: supple, no carotid bruits   HEART: regular rhythm, normal S1 and S2, no murmurs, clicks, gallops or rubs   LUNGS: clear to auscultation bilaterally; no wheezes, rales, or rhonchi   ABDOMEN: normal bowel sounds, soft, no tenderness, no distention  EXTREMITIES: peripheral pulses normal; no clubbing, cyanosis, or edema  NEURO: no focal findings   SKIN: normal without suspicious lesions on exposed skin    Medications:      Current Outpatient Medications:     aspirin 81 MG tablet, Take 1 tablet by mouth daily, Disp: , Rfl:     Cholecalciferol (VITAMIN D3) 2000 units capsule, Take 1 tablet by mouth daily, Disp: , Rfl:     ketoconazole (NIZORAL) 2 % cream, Apply topically daily Twice a day to affected areas on forehead when having an active flare and twice a week for maintenance., Disp: 60 g, Rfl: 2    Magnesium 250 MG TABS, Take 250 tablets by mouth daily as needed, Disp: , Rfl:     metroNIDAZOLE (METROGEL) 0.75 % gel, Apply topically 2 (two) times a day, Disp: 45 g, Rfl: 5    Multiple Vitamin-Folic Acid TABS, Take 1 tablet by mouth daily, Disp: , Rfl:     Psyllium 28.3 % POWD, Take by mouth, Disp: , Rfl:     solifenacin (VESICARE) 10 MG tablet, Take 1 tablet (10 mg total) by mouth daily, Disp: 90 tablet, Rfl: 3    valsartan-hydrochlorothiazide (DIOVAN-HCT) 320-25 MG per tablet, Take 1 tablet by mouth daily, Disp: 90 tablet, Rfl: 3     Family History   Problem Relation Age of Onset    Hypertension Mother     Dementia Mother     Heart disease Mother     Hearing loss Mother     Coronary artery disease Father     Diverticulitis Father         Colon    Stroke Father         Syndrome    Venous thrombosis Father         of the deep Vessels of the distal lower extremity    Hypertension Father     Heart disease Father     Diabetes Brother     Lung cancer Brother 50     Venous thrombosis Brother         of the deep vessels of the distal Lower extremity    Melanoma Brother     Hypertension Brother     Heart disease Brother     Cancer Brother     Diabetes Paternal Grandfather     Lung cancer Family         Adenocarcinoma of the Lung    Other Family         Disorders of Blood and Blood-forming Organs, Skin Disorder    Heart disease Family     Colon cancer Paternal Aunt     No Known Problems Daughter     No Known Problems Maternal Grandmother     No Known Problems Maternal Grandfather     No Known Problems Paternal Grandmother      Social History     Socioeconomic History    Marital status: /Civil Union     Spouse name: Not on file    Number of children: Not on file    Years of education: Not on file    Highest education level: Not on file   Occupational History    Not on file   Tobacco Use    Smoking status: Never     Passive exposure: Past    Smokeless tobacco: Never   Vaping Use    Vaping status: Never Used   Substance and Sexual Activity    Alcohol use: Yes     Alcohol/week: 1.0 standard drink of alcohol     Types: 1 Glasses of wine per week     Comment: Rare    Drug use: No    Sexual activity: Not Currently     Partners: Male     Birth control/protection: Post-menopausal   Other Topics Concern    Not on file   Social History Narrative    Caffeine use    Exercise frequency      Social Determinants of Health     Financial Resource Strain: Patient Declined (6/1/2023)    Overall Financial Resource Strain (CARDIA)     Difficulty of Paying Living Expenses: Patient declined   Food Insecurity: No Food Insecurity (6/10/2024)    Hunger Vital Sign     Worried About Running Out of Food in the Last Year: Never true     Ran Out of Food in the Last Year: Never true   Transportation Needs: No Transportation Needs (6/10/2024)    PRAPARE - Transportation     Lack of Transportation (Medical): No     Lack of Transportation (Non-Medical): No   Physical Activity: Not on file   Stress: Not on  file   Social Connections: Not on file   Intimate Partner Violence: Not on file   Housing Stability: Low Risk  (6/10/2024)    Housing Stability Vital Sign     Unable to Pay for Housing in the Last Year: No     Number of Times Moved in the Last Year: 0     Homeless in the Last Year: No     Social History     Tobacco Use   Smoking Status Never    Passive exposure: Past   Smokeless Tobacco Never     Social History     Substance and Sexual Activity   Alcohol Use Yes    Alcohol/week: 1.0 standard drink of alcohol    Types: 1 Glasses of wine per week    Comment: Rare       Labs & Results:  Below is the patient's most recent value for Albumin, ALT, AST, BUN, Calcium, Chloride, Cholesterol, CO2, Creatinine, GFR, Glucose, HDL, Hematocrit, Hemoglobin, Hemoglobin A1C, LDL, Magnesium, Phosphorus, Platelets, Potassium, PSA, Sodium, Triglycerides, and WBC.   Lab Results   Component Value Date    ALT 12 2023    AST 16 2023    BUN 21 2023    CALCIUM 9.2 2023     2023    CHOL 195 2017    CO2 31 2023    CREATININE 0.88 2023    HDL 56 2023    HCT 36.3 2023    HGB 12.3 2023    MG 2.1 2016     2023    K 4.0 2023     2017    TRIG 117 2023    WBC 5.2 2023     Note: for a comprehensive list of the patient's lab results, access the Results Review activity.            Cardiac testing:   Results for orders placed during the hospital encounter of 16   Echo complete with contrast if indicated    Narrative [address] [address] [phone]    Transthoracic Echocardiogram  2D, M-mode, Doppler, and Color Doppler    Study date:  2016    Patient: ELIZABETH RANDALL  MR number: [id number]  Account number: [id number]  : 1945  Age: 70 years  Gender: Female  Status: Outpatient  Location: Bedside  Height: 61 in  Weight: 159 lb  BP: 140/ 62  mmHg    Indications: Palpitations.    Diagnoses: R00.2 - Palpitations    Sonographer:  ADORE Garcia  Primary Physician:  Shannon Reis DO  Group:  [facility]  Interpreting Physician:  Malcolm Quinn MD    SUMMARY    LEFT VENTRICLE:  Systolic function was normal by visual assessment. Ejection fraction was  estimated to be 70 %.  There were no regional wall motion abnormalities.    RIGHT VENTRICLE:  The size was normal.  Systolic function was normal.    MITRAL VALVE:  There was mild regurgitation.    TRICUSPID VALVE:  There was mild to moderate regurgitation.    PULMONIC VALVE:  There was trace regurgitation.    IVC, HEPATIC VEINS:  Respirophasic changes were blunted (less than 50% variation).    HISTORY: PRIOR HISTORY: Risk factors: hypertension.    PROCEDURE: The procedure was performed at the bedside. This was a routine  study. The transthoracic approach was used. The study included complete 2D  imaging, M-mode, complete spectral Doppler, and color Doppler. The heart rate  was 60 bpm, at the start of the study. Images were obtained from the  parasternal, apical, subcostal, and suprasternal notch acoustic windows.  Echocardiographic views were limited due to poor acoustic window availability,  decreased penetration, and lung interference. This was a technically difficult  study.    LEFT VENTRICLE: Size was normal. Systolic function was normal by visual  assessment. Ejection fraction was estimated to be 70 %. There were no regional  wall motion abnormalities. Wall thickness was normal. DOPPLER: Left ventricular  diastolic function parameters were normal.    RIGHT VENTRICLE: The size was normal. Systolic function was normal. Wall  thickness was normal.    LEFT ATRIUM: Size was normal.    RIGHT ATRIUM: Size was normal.    MITRAL VALVE: Valve structure was normal. There was normal leaflet separation.  DOPPLER: The transmitral velocity was within the normal range. There was no  evidence for  stenosis. There was mild regurgitation.    AORTIC VALVE: The valve was trileaflet. Leaflets exhibited normal thickness and  normal cuspal separation. DOPPLER: Transaortic velocity was within the normal  range. There was no evidence for stenosis. There was no significant  regurgitation.    TRICUSPID VALVE: The valve structure was normal. There was normal leaflet  separation. DOPPLER: The transtricuspid velocity was within the normal range.  There was no evidence for stenosis. There was mild to moderate regurgitation.  Estimated peak PA pressure was 25 mmHg.    PULMONIC VALVE: Not well visualized. DOPPLER: The transpulmonic velocity was  within the normal range. There was trace regurgitation.    PERICARDIUM: There was no pericardial effusion. The pericardium was normal in  appearance.    AORTA: The root exhibited normal size.    SYSTEMIC VEINS: IVC: The inferior vena cava was normal in size. Respirophasic  changes were blunted (less than 50% variation).    MEASUREMENT TABLES    OTHER ECHO MEASUREMENTS  (Reference normals)  Estimated CVP   8 mmHg   (--)    SYSTEM MEASUREMENT TABLES    2D mode  AV Diam: 3.5 cm  AoR Diam; Mean (2D): 3.5 cm  LA Diam (2D): 3.4 cm  LA Dimension; Mean (2D): 3.4 cm  LA/Ao (2D): 0.97  EDV (2D-Cubed): 41.8 cm3  EF (2D-Cubed): 77.5 %  ESV (2D-Cubed): 9.39 cm3  FS (2D-Cubed): 39.2 %  FS (2D-Teich): 39.2 %  IVS/LVPW (2D): 1.09  IVSd (2D): 0.9 cm  IVSd; Mean chosen (2D): 0.9 cm  LVIDd (2D): 3.47 cm  LVIDd; Mean (2D): 3.47 cm  LVIDs (2D): 2.11 cm  LVIDs; Mean (2D): 2.11 cm  LVPWd (2D): 0.83 cm  LVPWd; Mean (2D): 0.83 cm  Left Ventricular Ejection Fraction; Teichholz; 2D mode;: 70.7 %  Left Ventricular End Diastolic Volume; Teichholz; 2D mode;: 49.8 cm3  Left Ventricular End Systolic Volume; Teichholz; 2D mode;: 14.6 cm3  SV (2D-Cubed): 32.4 cm3  Stroke Volume; Teichholz; 2D mode;: 35.2 cm3  RVIDd (2D): 3.59 cm  RVIDd; Mean (2D): 3.59 cm    Apical four chamber  LV MOD Diam; Recent value; End Diastole  (A4C): 1.85 cm  LV MOD Diam; Recent value; End Diastole (A4C): 4.14 cm  LV MOD Diam; Recent value; End Diastole (A4C): 3.84 cm  LV MOD Diam; Recent value; End Diastole (A4C): 4.24 cm  LV MOD Diam; Recent value; End Diastole (A4C): 4.26 cm  LV MOD Diam; Recent value; End Diastole (A4C): 4.21 cm  LV MOD Diam; Recent value; End Diastole (A4C): 4.03 cm  LV MOD Diam; Recent value; End Diastole (A4C): 1.5 cm  LV MOD Diam; Recent value; End Diastole (A4C): 2.24 cm  LV MOD Diam; Recent value; End Diastole (A4C): 2.71 cm  LV MOD Diam; Recent value; End Diastole (A4C): 3.03 cm  LV MOD Diam; Recent value; End Diastole (A4C): 3.27 cm  LV MOD Diam; Recent value; End Diastole (A4C): 3.52 cm  LV MOD Diam; Recent value; End Diastole (A4C): 3.74 cm  LV MOD Diam; Recent value; End Diastole (A4C): 3.89 cm  LV MOD Diam; Recent value; End Diastole (A4C): 3.96 cm  LV MOD Diam; Recent value; End Diastole (A4C): 3.99 cm  LV MOD Diam; Recent value; End Diastole (A4C): 4.11 cm  LV MOD Diam; Recent value; End Diastole (A4C): 4.24 cm  LV MOD Diam; Recent value; End Diastole (A4C): 4.26 cm  LV MOD Diam; Recent value; End Systole (A4C): 1.65 cm  LV MOD Diam; Recent value; End Systole (A4C): 3.06 cm  LV MOD Diam; Recent value; End Systole (A4C): 3.06 cm  LV MOD Diam; Recent value; End Systole (A4C): 2.92 cm  LV MOD Diam; Recent value; End Systole (A4C): 2.72 cm  LV MOD Diam; Recent value; End Systole (A4C): 1.75 cm  LV MOD Diam; Recent value; End Systole (A4C): 2.02 cm  LV MOD Diam; Recent value; End Systole (A4C): 2.21 cm  LV MOD Diam; Recent value; End Systole (A4C): 2.36 cm  LV MOD Diam; Recent value; End Systole (A4C): 2.48 cm  LV MOD Diam; Recent value; End Systole (A4C): 2.55 cm  LV MOD Diam; Recent value; End Systole (A4C): 2.55 cm  LV MOD Diam; Recent value; End Systole (A4C): 2.6 cm  LV MOD Diam; Recent value; End Systole (A4C): 2.65 cm  LV MOD Diam; Recent value; End Systole (A4C): 0.83 cm  LV MOD Diam; Recent value; End Systole (A4C):  1.19 cm  LV MOD Diam; Recent value; End Systole (A4C): 1.51 cm  LV MOD Diam; Recent value; End Systole (A4C): 2.8 cm  LV MOD Diam; Recent value; End Systole (A4C): 3.01 cm  LV MOD Diam; Recent value; End Systole (A4C): 3.06 cm  LVEF MOD A4C: 68.8 %  Left Ventricle diastolic major axis; Most recent value chosen; Method of Disks,  Single Plane; 2D mode; Apical four chamber;: 7.88 cm Left Ventricle systolic  major axis; Most recent value chosen; Method of Disks, Single Plane; 2D mode;  Apical four chamber;: 5.52 cm Left Ventricular Diastolic Area; Most recent  value chosen; Method of Disks, Single Plane; 2D mode; Apical four chamber;:  2810 mm2 Left Ventricular End Diastolic Volume; Most recent value chosen;  Method of Disks, Single Plane; 2D mode; Apical four chamber;: 82.2 cm3 Left  Ventricular End Systolic Volume; Most recent value chosen; Method of Disks,  Single Plane; 2D mode; Apical four chamber;: 25.7 cm3 Left Ventricular Systolic  Area; Most recent value chosen; Method of Disks, Single Plane; 2D mode; Apical  four chamber;: 1300 mm2  SV MOD A4C: 56.6 cm3    Tissue Doppler Imaging  LV Peak Early Vo Tissue Zafar; Medial MA (TDI): 69.6 mm/s  Left Ventricular Peak Early Diastolic Tissue Velocity; Mean; Mean value chosen;  Medial Mitral Annulus; Tissue Doppler Imaging;: 69.6 mm/s    Unspecified Scan Mode  MV Peak Zafar/LV Peak Tissue Zafar E-Wave; Medial MA: 12.4  Dec [place]; Antegrade Flow: 5140 mm/s2  Dec [place]; Mean; Antegrade Flow: 5140 mm/s2  MV A Zafar: 762 mm/s  MV E Zafar: 863 mm/s  MV E/A Ratio: 1.1  MV Peak A Zafar: 762 mm/s  MV Peak E Zafar; Mean; Antegrade Flow: 863 mm/s  Peak Grad; Mean; Regurgitant Flow: 21 mm Hg  Vmax; Mean; Regurgitant Flow: 2300 mm/s  Vmax; Regurgitant Flow: 2390 mm/s  Vmax; Regurgitant Flow: 2200 mm/s    [facility]    Prepared and electronically signed by    Malcolm Quinn MD  Signed 05-Feb-2016 17:39:45       No results found for this or  any previous visit.  No results found for this or any previous visit.  No results found for this or any previous visit.

## 2024-08-26 ENCOUNTER — HOSPITAL ENCOUNTER (INPATIENT)
Facility: HOSPITAL | Age: 79
LOS: 2 days | Discharge: HOME | DRG: 287 | End: 2024-08-28
Attending: EMERGENCY MEDICINE | Admitting: STUDENT IN AN ORGANIZED HEALTH CARE EDUCATION/TRAINING PROGRAM
Payer: COMMERCIAL

## 2024-08-26 ENCOUNTER — APPOINTMENT (EMERGENCY)
Dept: RADIOLOGY | Facility: HOSPITAL | Age: 79
DRG: 287 | End: 2024-08-26
Payer: COMMERCIAL

## 2024-08-26 DIAGNOSIS — R07.9 CHEST PAIN: Primary | ICD-10-CM

## 2024-08-26 DIAGNOSIS — I20.89 OTHER FORMS OF ANGINA PECTORIS: ICD-10-CM

## 2024-08-26 DIAGNOSIS — I10 HYPERTENSION, UNSPECIFIED TYPE: Primary | ICD-10-CM

## 2024-08-26 PROBLEM — R07.89 CHEST DISCOMFORT: Status: ACTIVE | Noted: 2024-08-26

## 2024-08-26 LAB
25(OH)D3 SERPL-MCNC: 57.3 NG/ML (ref 30–100)
ALBUMIN SERPL BCG-MCNC: 3.8 G/DL (ref 3.5–5)
ALP SERPL-CCNC: 92 U/L (ref 34–104)
ALT SERPL W P-5'-P-CCNC: 13 U/L (ref 7–52)
ANION GAP SERPL CALCULATED.3IONS-SCNC: 6 MMOL/L (ref 4–13)
AST SERPL W P-5'-P-CCNC: 16 U/L (ref 13–39)
ATRIAL RATE: 67 BPM
ATRIAL RATE: 68 BPM
BASOPHILS # BLD AUTO: 0.03 THOUSANDS/ÂΜL (ref 0–0.1)
BASOPHILS NFR BLD AUTO: 0 % (ref 0–1)
BILIRUB SERPL-MCNC: 0.48 MG/DL (ref 0.2–1)
BUN SERPL-MCNC: 16 MG/DL (ref 5–25)
CALCIUM SERPL-MCNC: 9.2 MG/DL (ref 8.4–10.2)
CARDIAC TROPONIN I PNL SERPL HS: 14 NG/L
CATH EF QUANTITATIVE: 65 %
CHLORIDE SERPL-SCNC: 104 MMOL/L (ref 96–108)
CHOLEST SERPL-MCNC: 180 MG/DL
CO2 SERPL-SCNC: 29 MMOL/L (ref 21–32)
CREAT SERPL-MCNC: 0.91 MG/DL (ref 0.6–1.3)
D DIMER PPP FEU-MCNC: 0.66 UG/ML FEU
EOSINOPHIL # BLD AUTO: 0.21 THOUSAND/ÂΜL (ref 0–0.61)
EOSINOPHIL NFR BLD AUTO: 3 % (ref 0–6)
ERYTHROCYTE [DISTWIDTH] IN BLOOD BY AUTOMATED COUNT: 12.9 % (ref 11.6–15.1)
GFR SERPL CREATININE-BSD FRML MDRD: 60 ML/MIN/1.73SQ M
GLUCOSE SERPL-MCNC: 91 MG/DL (ref 65–140)
HCT VFR BLD AUTO: 38.5 % (ref 34.8–46.1)
HDLC SERPL-MCNC: 45 MG/DL
HGB BLD-MCNC: 12.4 G/DL (ref 11.5–15.4)
IMM GRANULOCYTES # BLD AUTO: 0.02 THOUSAND/UL (ref 0–0.2)
IMM GRANULOCYTES NFR BLD AUTO: 0 % (ref 0–2)
LDLC SERPL CALC-MCNC: 112 MG/DL (ref 0–100)
LYMPHOCYTES # BLD AUTO: 1.44 THOUSANDS/ÂΜL (ref 0.6–4.47)
LYMPHOCYTES NFR BLD AUTO: 22 % (ref 14–44)
MCH RBC QN AUTO: 31 PG (ref 26.8–34.3)
MCHC RBC AUTO-ENTMCNC: 32.2 G/DL (ref 31.4–37.4)
MCV RBC AUTO: 96 FL (ref 82–98)
MONOCYTES # BLD AUTO: 0.63 THOUSAND/ÂΜL (ref 0.17–1.22)
MONOCYTES NFR BLD AUTO: 9 % (ref 4–12)
NEUTROPHILS # BLD AUTO: 4.35 THOUSANDS/ÂΜL (ref 1.85–7.62)
NEUTS SEG NFR BLD AUTO: 66 % (ref 43–75)
NRBC BLD AUTO-RTO: 0 /100 WBCS
P AXIS: 55 DEGREES
P AXIS: 58 DEGREES
PLATELET # BLD AUTO: 284 THOUSANDS/UL (ref 149–390)
PMV BLD AUTO: 11.7 FL (ref 8.9–12.7)
POTASSIUM SERPL-SCNC: 3.7 MMOL/L (ref 3.5–5.3)
PR INTERVAL: 198 MS
PR INTERVAL: 212 MS
PROT SERPL-MCNC: 6.8 G/DL (ref 6.4–8.4)
QRS AXIS: 72 DEGREES
QRS AXIS: 88 DEGREES
QRSD INTERVAL: 72 MS
QRSD INTERVAL: 74 MS
QT INTERVAL: 448 MS
QT INTERVAL: 470 MS
QTC INTERVAL: 476 MS
QTC INTERVAL: 496 MS
RBC # BLD AUTO: 4 MILLION/UL (ref 3.81–5.12)
SODIUM SERPL-SCNC: 139 MMOL/L (ref 135–147)
T WAVE AXIS: 105 DEGREES
T WAVE AXIS: 118 DEGREES
T4 FREE SERPL-MCNC: 0.89 NG/DL (ref 0.61–1.12)
TRIGL SERPL-MCNC: 114 MG/DL
TSH SERPL DL<=0.05 MIU/L-ACNC: 4.87 UIU/ML (ref 0.45–4.5)
VENTRICULAR RATE: 67 BPM
VENTRICULAR RATE: 68 BPM
WBC # BLD AUTO: 6.68 THOUSAND/UL (ref 4.31–10.16)

## 2024-08-26 PROCEDURE — 71045 X-RAY EXAM CHEST 1 VIEW: CPT

## 2024-08-26 PROCEDURE — 99285 EMERGENCY DEPT VISIT HI MDM: CPT | Performed by: EMERGENCY MEDICINE

## 2024-08-26 PROCEDURE — C1894 INTRO/SHEATH, NON-LASER: HCPCS | Performed by: INTERNAL MEDICINE

## 2024-08-26 PROCEDURE — 93458 L HRT ARTERY/VENTRICLE ANGIO: CPT | Performed by: INTERNAL MEDICINE

## 2024-08-26 PROCEDURE — 99222 1ST HOSP IP/OBS MODERATE 55: CPT | Performed by: STUDENT IN AN ORGANIZED HEALTH CARE EDUCATION/TRAINING PROGRAM

## 2024-08-26 PROCEDURE — 84439 ASSAY OF FREE THYROXINE: CPT

## 2024-08-26 PROCEDURE — 85025 COMPLETE CBC W/AUTO DIFF WBC: CPT | Performed by: EMERGENCY MEDICINE

## 2024-08-26 PROCEDURE — 93010 ELECTROCARDIOGRAM REPORT: CPT | Performed by: INTERNAL MEDICINE

## 2024-08-26 PROCEDURE — 4A023N7 MEASUREMENT OF CARDIAC SAMPLING AND PRESSURE, LEFT HEART, PERCUTANEOUS APPROACH: ICD-10-PCS | Performed by: INTERNAL MEDICINE

## 2024-08-26 PROCEDURE — 99152 MOD SED SAME PHYS/QHP 5/>YRS: CPT | Performed by: INTERNAL MEDICINE

## 2024-08-26 PROCEDURE — 80053 COMPREHEN METABOLIC PANEL: CPT | Performed by: EMERGENCY MEDICINE

## 2024-08-26 PROCEDURE — 93005 ELECTROCARDIOGRAM TRACING: CPT

## 2024-08-26 PROCEDURE — C1769 GUIDE WIRE: HCPCS | Performed by: INTERNAL MEDICINE

## 2024-08-26 PROCEDURE — 85379 FIBRIN DEGRADATION QUANT: CPT | Performed by: STUDENT IN AN ORGANIZED HEALTH CARE EDUCATION/TRAINING PROGRAM

## 2024-08-26 PROCEDURE — 36415 COLL VENOUS BLD VENIPUNCTURE: CPT | Performed by: EMERGENCY MEDICINE

## 2024-08-26 PROCEDURE — 84484 ASSAY OF TROPONIN QUANT: CPT | Performed by: EMERGENCY MEDICINE

## 2024-08-26 PROCEDURE — 82306 VITAMIN D 25 HYDROXY: CPT

## 2024-08-26 PROCEDURE — 99285 EMERGENCY DEPT VISIT HI MDM: CPT

## 2024-08-26 PROCEDURE — NC001 PR NO CHARGE: Performed by: INTERNAL MEDICINE

## 2024-08-26 PROCEDURE — 80061 LIPID PANEL: CPT

## 2024-08-26 PROCEDURE — 84443 ASSAY THYROID STIM HORMONE: CPT

## 2024-08-26 RX ORDER — AMLODIPINE BESYLATE 2.5 MG/1
2.5 TABLET ORAL DAILY
Status: DISCONTINUED | OUTPATIENT
Start: 2024-08-26 | End: 2024-08-26

## 2024-08-26 RX ORDER — ONDANSETRON 2 MG/ML
4 INJECTION INTRAMUSCULAR; INTRAVENOUS EVERY 6 HOURS PRN
Status: DISCONTINUED | OUTPATIENT
Start: 2024-08-26 | End: 2024-08-28 | Stop reason: HOSPADM

## 2024-08-26 RX ORDER — FENTANYL CITRATE 50 UG/ML
INJECTION, SOLUTION INTRAMUSCULAR; INTRAVENOUS CODE/TRAUMA/SEDATION MEDICATION
Status: DISCONTINUED | OUTPATIENT
Start: 2024-08-26 | End: 2024-08-26 | Stop reason: HOSPADM

## 2024-08-26 RX ORDER — HYDROCHLOROTHIAZIDE 25 MG/1
25 TABLET ORAL DAILY
Status: DISCONTINUED | OUTPATIENT
Start: 2024-08-27 | End: 2024-08-28 | Stop reason: HOSPADM

## 2024-08-26 RX ORDER — HEPARIN SODIUM 5000 [USP'U]/ML
5000 INJECTION, SOLUTION INTRAVENOUS; SUBCUTANEOUS EVERY 8 HOURS
Status: DISCONTINUED | OUTPATIENT
Start: 2024-08-26 | End: 2024-08-28 | Stop reason: HOSPADM

## 2024-08-26 RX ORDER — ACETAMINOPHEN 325 MG/1
650 TABLET ORAL EVERY 4 HOURS PRN
Status: DISCONTINUED | OUTPATIENT
Start: 2024-08-26 | End: 2024-08-28 | Stop reason: HOSPADM

## 2024-08-26 RX ORDER — SODIUM CHLORIDE 9 MG/ML
125 INJECTION, SOLUTION INTRAVENOUS CONTINUOUS
Status: DISCONTINUED | OUTPATIENT
Start: 2024-08-26 | End: 2024-08-27

## 2024-08-26 RX ORDER — SENNOSIDES 8.6 MG
1 TABLET ORAL DAILY
Status: DISCONTINUED | OUTPATIENT
Start: 2024-08-27 | End: 2024-08-26

## 2024-08-26 RX ORDER — MIDAZOLAM HYDROCHLORIDE 2 MG/2ML
INJECTION, SOLUTION INTRAMUSCULAR; INTRAVENOUS CODE/TRAUMA/SEDATION MEDICATION
Status: DISCONTINUED | OUTPATIENT
Start: 2024-08-26 | End: 2024-08-26 | Stop reason: HOSPADM

## 2024-08-26 RX ORDER — LOSARTAN POTASSIUM 50 MG/1
100 TABLET ORAL DAILY
Status: DISCONTINUED | OUTPATIENT
Start: 2024-08-27 | End: 2024-08-28 | Stop reason: HOSPADM

## 2024-08-26 RX ORDER — LIDOCAINE HYDROCHLORIDE 10 MG/ML
INJECTION, SOLUTION EPIDURAL; INFILTRATION; INTRACAUDAL; PERINEURAL CODE/TRAUMA/SEDATION MEDICATION
Status: DISCONTINUED | OUTPATIENT
Start: 2024-08-26 | End: 2024-08-26 | Stop reason: HOSPADM

## 2024-08-26 RX ORDER — POLYETHYLENE GLYCOL 3350 17 G/17G
17 POWDER, FOR SOLUTION ORAL DAILY
Status: DISCONTINUED | OUTPATIENT
Start: 2024-08-27 | End: 2024-08-28 | Stop reason: HOSPADM

## 2024-08-26 RX ORDER — OXYBUTYNIN CHLORIDE 10 MG/1
10 TABLET, EXTENDED RELEASE ORAL DAILY
Status: DISCONTINUED | OUTPATIENT
Start: 2024-08-27 | End: 2024-08-28 | Stop reason: HOSPADM

## 2024-08-26 RX ORDER — SODIUM CHLORIDE 9 MG/ML
100 INJECTION, SOLUTION INTRAVENOUS CONTINUOUS
Status: DISPENSED | OUTPATIENT
Start: 2024-08-26 | End: 2024-08-26

## 2024-08-26 RX ORDER — ASPIRIN 81 MG/1
324 TABLET, CHEWABLE ORAL ONCE
Status: COMPLETED | OUTPATIENT
Start: 2024-08-26 | End: 2024-08-26

## 2024-08-26 RX ORDER — ATORVASTATIN CALCIUM 40 MG/1
40 TABLET, FILM COATED ORAL
Status: DISCONTINUED | OUTPATIENT
Start: 2024-08-26 | End: 2024-08-28 | Stop reason: HOSPADM

## 2024-08-26 RX ORDER — SENNOSIDES 8.6 MG
1 TABLET ORAL
Status: DISCONTINUED | OUTPATIENT
Start: 2024-08-27 | End: 2024-08-28 | Stop reason: HOSPADM

## 2024-08-26 RX ORDER — ASPIRIN 81 MG/1
81 TABLET, CHEWABLE ORAL DAILY
Status: DISCONTINUED | OUTPATIENT
Start: 2024-08-26 | End: 2024-08-28 | Stop reason: HOSPADM

## 2024-08-26 RX ADMIN — ASPIRIN 81 MG CHEWABLE TABLET 324 MG: 81 TABLET CHEWABLE at 10:34

## 2024-08-26 RX ADMIN — SODIUM CHLORIDE 100 ML/HR: 0.9 INJECTION, SOLUTION INTRAVENOUS at 13:53

## 2024-08-26 RX ADMIN — AMLODIPINE BESYLATE 2.5 MG: 2.5 TABLET ORAL at 13:54

## 2024-08-26 RX ADMIN — ATORVASTATIN CALCIUM 40 MG: 40 TABLET, FILM COATED ORAL at 17:06

## 2024-08-26 RX ADMIN — ASPIRIN 81 MG CHEWABLE TABLET 81 MG: 81 TABLET CHEWABLE at 17:06

## 2024-08-26 RX ADMIN — SODIUM CHLORIDE 125 ML/HR: 0.9 INJECTION, SOLUTION INTRAVENOUS at 10:56

## 2024-08-26 RX ADMIN — HEPARIN SODIUM 5000 UNITS: 5000 INJECTION INTRAVENOUS; SUBCUTANEOUS at 21:13

## 2024-08-26 NOTE — ASSESSMENT & PLAN NOTE
Patient presented with chest pain during exertion and associated nausea and pain/numbness on her bilateral arms and neck  Patient had a suspected viral illness approximately 3 weeks ago  Initial troponin 14  TSH elevated  No electrolyte abnormalities on CMP  Chest x-ray with no acute cardiopulmonary disease  EKG normal sinus rhythm with prolonged QT, no ST elevation  S/p cardiac cath today that showed mild nonobstructive coronary artery disease  Continue aspirin and Lipitor  TTE pending  D-dimer ordered  Will obtain CTA of chest to evaluate for PE as etiology of symptoms tomorrow to allow for some renal recovery after cardiac cath contrast  Continue telemetry

## 2024-08-26 NOTE — ED ATTENDING ATTESTATION
"I, Taurus Herrera MD, saw and evaluated the patient. I have discussed the patient with the resident and agree with the resident's findings, Plan of Care, and MDM as documented in the resident's note, except where noted. All available labs and Radiology studies were reviewed.  I was present for key portions of any procedure(s) performed by the resident and I was immediately available to provide assistance.    At this point I agree with the current assessment done in the Emergency Department.  I have conducted an independent evaluation of this patient a history and physical is as follows:    80 yo female with a history of HTN, diverticulitis, basal cell carcinoma, hyperlipidemia, and osteoporosis presents to the ED complaining of intermittent chest pain x several days. The patient reports an exertional \"crushing\" pain in her anterior chest. Pain occasionally radiates into her upper extremities and neck. (+) Associated nausea but no vomiting. She denies shortness of breath. No fevers or chills. No chest pain at present. The patient called her cardiologist's office today and was advised to come to the ED. No other specific complaints.    ROS: per resident physician note    Gen: NAD, AA&Ox3  HEENT: PERRL, EOMI  Neck: supple  CV: RRR  Lungs: CTA B/L  Abdomen: soft, NT/ND  Ext: no swelling or deformity  Neuro: 5/5 strength all extremities, sensation grossly intact  Skin: no rash    ED Course  The patient is comfortable appearing with stable vital signs and a benign physical examination. She is asymptomatic at present. EKG is markedly changed from previous. ACS vs stable angina vs musculoskeletal pain? Lower clinical suspicion for PE, TAD, or arrhythmia. ASA administered. Case discussed with Cardiology. Will check basic labs, troponin, and CXR. Plan to admit to [facility] for probable cardiac catheterization later today. Will continue to monitor in the ED while awaiting inpatient bed assignment.      Critical Care " Time  Procedures

## 2024-08-26 NOTE — ASSESSMENT & PLAN NOTE
PTA valsartan-hydrochlorothiazide 320-25 mg daily  While inpatient substitution losartan 100 mg daily-hydrochlorothiazide 25 mg daily  BP reviewed and stable

## 2024-08-26 NOTE — ASSESSMENT & PLAN NOTE
Latest Reference Range & Units 08/26/24 09:54   TSH 3RD GENERATON 0.450 - 4.500 uIU/mL 4.871 (H)   FREE T4 0.61 - 1.12 ng/dL 0.89   Not on medications at home  Recommend PCP follow-up with repeat in 4-6 weeks

## 2024-08-26 NOTE — CONSULTS
Consult - Cardiology   Luis Kelly 79 y.o. female MRN: [id number]  Unit/Bed#: PPHP-326-01 Encounter: [id number]    Assessment & Plan     Assessment:  Chest pain, concerning for angina-ACS ruled out  Hypertension  History of basal cell carcinoma/osteoporosis    Plan:  Patient taken to left invasive left coronary angiography which showed mild nonobstructive coronary disease  Recommend obtaining transthoracic echocardiogram  Patient started on aspirin and Lipitor 40 mg daily    History of Present Illness     HPI:  Luis Kelly is a 79 y.o. female with past medical history significant for hypertension, osteoporosis, basal cell carcinoma, presenting to the hospital with complaints of chest pain.  Reports ongoing left-sided chest pain for the past few days, dull in nature, worse intensity of 9 on 10, lasted about 20 minutes, associated with activity, improves with rest with radiation to the neck and shoulders.  Also admits to nausea but no vomiting.  Reports consistent symptoms with activity on 2 successive days by taking a walk with her  and most importantly this was worse and going up an incline.  Denies any prior history of myocardial infarction.  EKG with no acute ischemic changes significant for prolonged QT.    Review of Systems   Constitutional:  Negative for chills and fever.   HENT:  Negative for ear pain and sore throat.    Eyes:  Negative for pain and visual disturbance.   Respiratory:  Negative for cough and shortness of breath.    Cardiovascular:  Positive for chest pain. Negative for palpitations.   Gastrointestinal:  Negative for abdominal pain and vomiting.   Genitourinary:  Negative for dysuria and hematuria.   Musculoskeletal:  Negative for arthralgias and back pain.   Skin:  Negative for color change and rash.   Neurological:  Negative for seizures and syncope.   All other systems reviewed and are negative.    Historical Information   Past Medical History:   Diagnosis Date    Allergic      pennicillin and Dexamethazone    Basal cell carcinoma 1991    Cancer (HCC) 2001    Basal cell on right cheek    Diverticulitis of colon     Hypertension     Osteoporosis     Rosacea     Skin tag     Varicella      Past Surgical History:   Procedure Laterality Date    ADENOIDECTOMY      BREAST CYST EXCISION Left 1989    BREAST CYST EXCISION Left 1989    BREAST CYST EXCISION Right 1980    HEMORRHOID SURGERY      INCISIONAL BREAST BIOPSY Left     OTHER SURGICAL HISTORY      Arthrocentesis Aspiration of Ganglion Cyst    TONSILLECTOMY      UTERINE FIBROID EMBOLIZATION       Social History   Social History     Substance and Sexual Activity   Alcohol Use Yes    Alcohol/week: 1.0 standard drink of alcohol    Types: 1 Glasses of wine per week    Comment: Rare     Social History     Substance and Sexual Activity   Drug Use No     Social History     Tobacco Use   Smoking Status Never    Passive exposure: Past   Smokeless Tobacco Never     E-Cigarette/Vaping    E-Cigarette Use Never User       E-Cigarette/Vaping Substances    Nicotine No     THC No     CBD No     Flavoring No     Other No     Unknown No        Family History:   Family History   Problem Relation Age of Onset    Hypertension Mother     Dementia Mother     Heart disease Mother     Hearing loss Mother     Coronary artery disease Father     Diverticulitis Father         Colon    Stroke Father         Syndrome    Venous thrombosis Father         of the deep Vessels of the distal lower extremity    Hypertension Father     Heart disease Father     Diabetes Brother     Lung cancer Brother 50    Venous thrombosis Brother         of the deep vessels of the distal Lower extremity    Melanoma Brother     Hypertension Brother     Heart disease Brother     Cancer Brother     Diabetes Paternal Grandfather     Lung cancer Family         Adenocarcinoma of the Lung    Other Family         Disorders of Blood and Blood-forming Organs, Skin Disorder    Heart disease Family      Colon cancer Paternal Aunt     No Known Problems Daughter     No Known Problems Maternal Grandmother     No Known Problems Maternal Grandfather     No Known Problems Paternal Grandmother        Meds/Allergies   all medications and allergies reviewed  Allergies   Allergen Reactions    Dexamethasone Facial Swelling    Tetanus Antitoxin Hives    Penicillins Rash       Objective   Vitals: Blood pressure 127/53, pulse 61, temperature 98.1 °F (36.7 °C), temperature source Oral, resp. rate 18, SpO2 95%, not currently breastfeeding.  Orthostatic Blood Pressures      Flowsheet Row Most Recent Value   Blood Pressure 127/53 filed at 08/26/2024 1237   Patient Position - Orthostatic VS Lying filed at 08/26/2024 0926            No intake or output data in the 24 hours ending 08/26/24 1321    Invasive Devices       Peripheral Intravenous Line  Duration             Peripheral IV 08/26/24 Right Antecubital <1 day                    Physical Exam  Vitals and nursing note reviewed.   Constitutional:       General: She is not in acute distress.     Appearance: Normal appearance. She is well-developed.   HENT:      Head: Normocephalic and atraumatic.   Eyes:      Conjunctiva/sclera: Conjunctivae normal.   Cardiovascular:      Rate and Rhythm: Normal rate and regular rhythm.      Heart sounds: No murmur heard.  Pulmonary:      Effort: Pulmonary effort is normal. No respiratory distress.      Breath sounds: Normal breath sounds.   Abdominal:      Palpations: Abdomen is soft.      Tenderness: There is no abdominal tenderness.   Musculoskeletal:         General: No swelling.      Cervical back: Neck supple.   Skin:     General: Skin is warm and dry.      Capillary Refill: Capillary refill takes less than 2 seconds.   Neurological:      Mental Status: She is alert.   Psychiatric:         Mood and Affect: Mood normal.       Lab Results: I have personally reviewed pertinent lab results.    Imaging: I have personally reviewed pertinent reports.     EKG: Normal sinus rhythm, prolonged QT.      Code Status: Level 1 - Full Code  Advance Directive and Living Will:      Power of :    POLST:      Lavelle Waller

## 2024-08-26 NOTE — H&P
[facility]  H&P  Name: Luis Kelly 79 y.o. female I MRN: [id number]  Unit/Bed#: PPHP-326-01 I Date of Admission: 8/26/2024   Date of Service: 8/26/2024 I Hospital Day: 0      Assessment & Plan   Urge incontinence of urine  Assessment & Plan  Continue Vesicare    Subclinical hypothyroidism  Assessment & Plan   Latest Reference Range & Units 08/26/24 09:54   TSH 3RD GENERATON 0.450 - 4.500 uIU/mL 4.871 (H)   FREE T4 0.61 - 1.12 ng/dL 0.89   Not on medications at home  Recommend PCP follow-up with repeat in 4-6 weeks    Hypertension  Assessment & Plan  PTA valsartan-hydrochlorothiazide 320-25 mg daily  While inpatient substitution losartan 100 mg daily-hydrochlorothiazide 25 mg daily  BP reviewed and stable    * Chest discomfort  Assessment & Plan  Patient presented with chest pain during exertion and associated nausea and pain/numbness on her bilateral arms and neck  Patient had a suspected viral illness approximately 3 weeks ago  Initial troponin 14  TSH elevated  No electrolyte abnormalities on CMP  Chest x-ray with no acute cardiopulmonary disease  EKG normal sinus rhythm with prolonged QT, no ST elevation  S/p cardiac cath today that showed mild nonobstructive coronary artery disease  Continue aspirin and Lipitor  TTE pending  D-dimer ordered  Will obtain CTA of chest to evaluate for PE as etiology of symptoms tomorrow to allow for some renal recovery after cardiac cath contrast  Continue telemetry           VTE Pharmacologic Prophylaxis: VTE Score: 3 Moderate Risk (Score 3-4) - Pharmacological DVT Prophylaxis Ordered: heparin.  Code Status: Level 1 - Full Code   Discussion with family: Updated  (daughter and father) at bedside.    Anticipated Length of Stay: Patient will be admitted on an inpatient basis with an anticipated length of stay of greater than 2 midnights secondary to TTE, monitor symptoms, chest imaging.    Total Time Spent on Date of Encounter  in care of patient: 35 mins. This time was spent on one or more of the following: performing physical exam; counseling and coordination of care; obtaining or reviewing history; documenting in the medical record; reviewing/ordering tests, medications or procedures; communicating with other healthcare professionals and discussing with patient's family/caregivers.    Chief Complaint: Chest pain    History of Present Illness:  Luis Kelly is a 79 y.o. female with a PMH of hypertension, basal cell carcinoma, hyperlipidemia, GERD and urinary incontinence who presents with chest pain with exertion with associated nausea and bilateral upper extremity paresthesias.  On admission troponin within normal limits and EKG without ischemic findings but given high suspicion of ACS patient was taken into cardiac cath that revealed mild nonobstructive CAD.  She received high-dose aspirin on admission.    On my evaluation patient is currently chest pain-free.  She reports a viral illness a few weeks ago with 1 day of chest discomfort after coughing episode that improved with Advil.    Review of Systems:  Review of Systems   Constitutional:  Negative for chills and fever.   Respiratory:  Negative for chest tightness and shortness of breath.    Cardiovascular:  Negative for chest pain.   Gastrointestinal:  Negative for abdominal pain and nausea.   Neurological:  Negative for dizziness and light-headedness.       Past Medical and Surgical History:   Past Medical History:   Diagnosis Date    Allergic     pennicillin and Dexamethazone    Basal cell carcinoma 1991    Cancer (HCC) 2001    Basal cell on right cheek    Diverticulitis of colon     Hypertension     Osteoporosis     Rosacea     Skin tag     Varicella        Past Surgical History:   Procedure Laterality Date    ADENOIDECTOMY      BREAST CYST EXCISION Left 1989    BREAST CYST EXCISION Left 1989    BREAST CYST EXCISION Right 1980    HEMORRHOID SURGERY      INCISIONAL BREAST  BIOPSY Left     OTHER SURGICAL HISTORY      Arthrocentesis Aspiration of Ganglion Cyst    TONSILLECTOMY      UTERINE FIBROID EMBOLIZATION         Meds/Allergies:  Prior to Admission medications    Medication Sig Start Date End Date Taking? Authorizing Provider   aspirin 81 MG tablet Take 1 tablet by mouth daily    Historical Provider, MD   Cholecalciferol (VITAMIN D3) 2000 units capsule Take 1 tablet by mouth daily    Historical Provider, MD   ketoconazole (NIZORAL) 2 % cream Apply topically daily Twice a day to affected areas on forehead when having an active flare and twice a week for maintenance. 8/10/23   Megan Reynaga MD   Magnesium 250 MG TABS Take 250 tablets by mouth daily as needed    Historical Provider, MD   metroNIDAZOLE (METROGEL) 0.75 % gel Apply topically 2 (two) times a day 6/7/23   Shannon Reis DO   Multiple Vitamin-Folic Acid TABS Take 1 tablet by mouth daily 6/9/15   Historical Provider, MD   Psyllium 28.3 % POWD Take by mouth 2/18/16   Historical Provider, MD   solifenacin (VESICARE) 10 MG tablet Take 1 tablet (10 mg total) by mouth daily 7/31/24   Shan Townsend DO   valsartan-hydrochlorothiazide (DIOVAN-HCT) 320-25 MG per tablet Take 1 tablet by mouth daily 7/31/24   Shan Townsend DO   azilsartan medoxomil (EDARBI) 80 MG tablet Take 1 tablet (80 mg total) by mouth daily 11/23/20 11/27/20  Severo Bolanos DO     I have reviewed home medications with patient personally.    Allergies:   Allergies   Allergen Reactions    Dexamethasone Facial Swelling    Tetanus Antitoxin Hives    Penicillins Rash       Social History:  Marital Status: /Civil Union   Occupation: Unknown  Patient Pre-hospital Living Situation: Home  Patient Pre-hospital Level of Mobility: walks  Patient Pre-hospital Diet Restrictions: None  Substance Use History:   Social History     Substance and Sexual Activity   Alcohol Use Yes    Alcohol/week: 1.0 standard drink of alcohol    Types: 1 Glasses of wine per week     Comment: Rare     Social History     Tobacco Use   Smoking Status Never    Passive exposure: Past   Smokeless Tobacco Never     Social History     Substance and Sexual Activity   Drug Use No       Family History:  Family History   Problem Relation Age of Onset    Hypertension Mother     Dementia Mother     Heart disease Mother     Hearing loss Mother     Coronary artery disease Father     Diverticulitis Father         Colon    Stroke Father         Syndrome    Venous thrombosis Father         of the deep Vessels of the distal lower extremity    Hypertension Father     Heart disease Father     Diabetes Brother     Lung cancer Brother 50    Venous thrombosis Brother         of the deep vessels of the distal Lower extremity    Melanoma Brother     Hypertension Brother     Heart disease Brother     Cancer Brother     Diabetes Paternal Grandfather     Lung cancer Family         Adenocarcinoma of the Lung    Other Family         Disorders of Blood and Blood-forming Organs, Skin Disorder    Heart disease Family     Colon cancer Paternal Aunt     No Known Problems Daughter     No Known Problems Maternal Grandmother     No Known Problems Maternal Grandfather     No Known Problems Paternal Grandmother        Physical Exam:     Vitals:   Blood Pressure: (!) 110/45 (08/26/24 1457)  Pulse: 65 (08/26/24 1457)  Temperature: 97.8 °F (36.6 °C) (08/26/24 1457)  Temp Source: Oral (08/26/24 1457)  Respirations: 16 (08/26/24 1457)  SpO2: 95 % (08/26/24 1457)    Physical Exam  Vitals and nursing note reviewed.   Constitutional:       General: She is not in acute distress.     Appearance: She is well-developed.   HENT:      Head: Normocephalic and atraumatic.   Eyes:      Conjunctiva/sclera: Conjunctivae normal.   Cardiovascular:      Rate and Rhythm: Normal rate and regular rhythm.   Pulmonary:      Effort: Pulmonary effort is normal. No respiratory distress.      Breath sounds: Normal breath sounds. No wheezing, rhonchi or rales.  "  Abdominal:      General: Bowel sounds are normal.      Palpations: Abdomen is soft.      Tenderness: There is no abdominal tenderness.   Musculoskeletal:         General: No swelling.      Cervical back: Neck supple.   Skin:     General: Skin is warm and dry.   Neurological:      Mental Status: She is alert.   Psychiatric:         Mood and Affect: Mood normal.         Behavior: Behavior normal.          Additional Data:     Lab Results:  Results from last 7 days   Lab Units 08/26/24  0954   WBC Thousand/uL 6.68   HEMOGLOBIN g/dL 12.4   HEMATOCRIT % 38.5   PLATELETS Thousands/uL 284   SEGS PCT % 66   LYMPHO PCT % 22   MONO PCT % 9   EOS PCT % 3     Results from last 7 days   Lab Units 08/26/24  0954   SODIUM mmol/L 139   POTASSIUM mmol/L 3.7   CHLORIDE mmol/L 104   CO2 mmol/L 29   BUN mg/dL 16   CREATININE mg/dL 0.91   ANION GAP mmol/L 6   CALCIUM mg/dL 9.2   ALBUMIN g/dL 3.8   TOTAL BILIRUBIN mg/dL 0.48   ALK PHOS U/L 92   ALT U/L 13   AST U/L 16   GLUCOSE RANDOM mg/dL 91             No results found for: \"HGBA1C\"        Lines/Drains:  Invasive Devices       Peripheral Intravenous Line  Duration             Peripheral IV 08/26/24 Right Antecubital <1 day                        Imaging: Reviewed radiology reports from this admission including: chest xray  XR chest 1 view portable   Final Result by Morgan Fofana MD (08/26 4097)      No acute cardiopulmonary disease.            Workstation performed: [id number]             EKG and Other Studies Reviewed on Admission:   EKG: Personally Reviewed.    ** Please Note: This note has been constructed using a voice recognition system. **    " Yes None

## 2024-08-26 NOTE — ED PROVIDER NOTES
History  Chief Complaint   Patient presents with    Chest Pain     Pt states during exertion she gets a crushing feeling across her upper chest, pt also complained of nausea, pain/numbness in her BL arms and neck. Pt states the last time this happened was 8/22 states she hasn't walked far since then and hasn't had that feeling. Denies SOB.      Patient presents 79-year-old female with pertinent past medical history of hypertension and palpitations who presents for evaluation of chest pain.  Patient states that since 8/19, she has been experiencing exertional chest pain.  She describes pain as retrosternal and crushing.  It radiates into bilateral arms and is associated with nausea.  She states pain improves with rest.  She has tried walking in her neighborhood multiple times over the past week to see if symptoms recur, which they have. She contacted her cardiologist's office today, who recommended she come to the emergency department and be admitted for possible catheterization.  Patient denies any cough, congestion, shortness of breath, abdominal pain, vomiting, fever, or chills.  She states that she has been taking aspirin 81 mg daily and valsartan hydrochlorothiazide 320-25 mg daily, but missed morning medications today secondary to nausea.        Prior to Admission Medications   Prescriptions Last Dose Informant Patient Reported? Taking?   Cholecalciferol (VITAMIN D3) 2000 units capsule  Self Yes No   Sig: Take 1 tablet by mouth daily   Magnesium 250 MG TABS  Self Yes No   Sig: Take 250 tablets by mouth daily as needed   Multiple Vitamin-Folic Acid TABS  Self Yes No   Sig: Take 1 tablet by mouth daily   Psyllium 28.3 % POWD  Self Yes No   Sig: Take by mouth   aspirin 81 MG tablet  Self Yes No   Sig: Take 1 tablet by mouth daily   ketoconazole (NIZORAL) 2 % cream   No No   Sig: Apply topically daily Twice a day to affected areas on forehead when having an active flare and twice a week for maintenance.    metroNIDAZOLE (METROGEL) 0.75 % gel   No No   Sig: Apply topically 2 (two) times a day   solifenacin (VESICARE) 10 MG tablet   No No   Sig: Take 1 tablet (10 mg total) by mouth daily   valsartan-hydrochlorothiazide (DIOVAN-HCT) 320-25 MG per tablet   No No   Sig: Take 1 tablet by mouth daily      Facility-Administered Medications: None       Past Medical History:   Diagnosis Date    Allergic     pennicillin and Dexamethazone    Basal cell carcinoma 1991    Cancer (HCC) 2001    Basal cell on right cheek    Diverticulitis of colon     Hypertension     Osteoporosis     Rosacea     Skin tag     Varicella        Past Surgical History:   Procedure Laterality Date    ADENOIDECTOMY      BREAST CYST EXCISION Left 1989    BREAST CYST EXCISION Left 1989    BREAST CYST EXCISION Right 1980    HEMORRHOID SURGERY      INCISIONAL BREAST BIOPSY Left     OTHER SURGICAL HISTORY      Arthrocentesis Aspiration of Ganglion Cyst    TONSILLECTOMY      UTERINE FIBROID EMBOLIZATION         Family History   Problem Relation Age of Onset    Hypertension Mother     Dementia Mother     Heart disease Mother     Hearing loss Mother     Coronary artery disease Father     Diverticulitis Father         Colon    Stroke Father         Syndrome    Venous thrombosis Father         of the deep Vessels of the distal lower extremity    Hypertension Father     Heart disease Father     Diabetes Brother     Lung cancer Brother 50    Venous thrombosis Brother         of the deep vessels of the distal Lower extremity    Melanoma Brother     Hypertension Brother     Heart disease Brother     Cancer Brother     Diabetes Paternal Grandfather     Lung cancer Family         Adenocarcinoma of the Lung    Other Family         Disorders of Blood and Blood-forming Organs, Skin Disorder    Heart disease Family     Colon cancer Paternal Aunt     No Known Problems Daughter     No Known Problems Maternal Grandmother     No Known Problems Maternal Grandfather     No Known  Problems Paternal Grandmother      I have reviewed and agree with the history as documented.    E-Cigarette/Vaping    E-Cigarette Use Never User      E-Cigarette/Vaping Substances    Nicotine No     THC No     CBD No     Flavoring No     Other No     Unknown No      Social History     Tobacco Use    Smoking status: Never     Passive exposure: Past    Smokeless tobacco: Never   Vaping Use    Vaping status: Never Used   Substance Use Topics    Alcohol use: Yes     Alcohol/week: 1.0 standard drink of alcohol     Types: 1 Glasses of wine per week     Comment: Rare    Drug use: No        Review of Systems   All other systems reviewed and are negative.      Physical Exam  ED Triage Vitals [08/26/24 0926]   Temperature Pulse Respirations Blood Pressure SpO2   98.1 °F (36.7 °C) 76 18 131/86 98 %      Temp Source Heart Rate Source Patient Position - Orthostatic VS BP Location FiO2 (%)   Oral Monitor Lying Right arm --      Pain Score       No Pain             Orthostatic Vital Signs  Vitals:    08/26/24 0926 08/26/24 1237   BP: 131/86 127/53   Pulse: 76 61   Patient Position - Orthostatic VS: Lying        Physical Exam  Constitutional:       General: She is not in acute distress.     Appearance: She is well-developed. She is not ill-appearing, toxic-appearing or diaphoretic.   HENT:      Head: Normocephalic and atraumatic.   Eyes:      Extraocular Movements: Extraocular movements intact.   Cardiovascular:      Rate and Rhythm: Normal rate and regular rhythm.      Pulses: Normal pulses.      Heart sounds: Normal heart sounds.   Pulmonary:      Effort: Pulmonary effort is normal.      Breath sounds: Normal breath sounds.   Abdominal:      General: Abdomen is flat.      Palpations: Abdomen is soft. There is no mass.   Musculoskeletal:         General: No swelling. Normal range of motion.      Cervical back: Normal range of motion.   Skin:     General: Skin is warm and dry.   Neurological:      General: No focal deficit present.  "     Mental Status: She is alert.   Psychiatric:         Mood and Affect: Mood normal.         Behavior: Behavior normal.         ED Medications  Medications   sodium chloride 0.9 % infusion (125 mL/hr Intravenous New Bag 8/26/24 1056)   senna (SENOKOT) tablet 8.6 mg (has no administration in time range)   polyethylene glycol (MIRALAX) packet 17 g (has no administration in time range)   ondansetron (ZOFRAN) injection 4 mg (has no administration in time range)   acetaminophen (TYLENOL) tablet 650 mg (has no administration in time range)   atorvastatin (LIPITOR) tablet 40 mg (has no administration in time range)   sodium chloride 0.9 % infusion (has no administration in time range)   amLODIPine (NORVASC) tablet 2.5 mg (has no administration in time range)   aspirin chewable tablet 324 mg (324 mg Oral Given 8/26/24 1034)       Diagnostic Studies  Results Reviewed       Procedure Component Value Units Date/Time    HS Troponin I 4hr [[id number]]     Lab Status: No result Specimen: Blood     T4, free [[id number]]  (Normal) Collected: 08/26/24 0954    Lab Status: Final result Specimen: Blood from Arm, Left Updated: 08/26/24 1148     Free T4 0.89 ng/dL     Narrative:        \"Therapeutic range for patients medicated with thyroid disorders: 0.61-1.24 ng/dL.\"    Vitamin D 25 hydroxy [[id number]]  (Normal) Collected: 08/26/24 0954    Lab Status: Final result Specimen: Blood from Arm, Left Updated: 08/26/24 1055     Vit D, 25-Hydroxy 57.3 ng/mL     TSH, 3rd generation with Free T4 reflex [[id number]]  (Abnormal) Collected: 08/26/24 0954    Lab Status: Final result Specimen: Blood from Arm, Left Updated: 08/26/24 1042     TSH 3RD GENERATON 4.871 uIU/mL     HS Troponin 0hr (reflex protocol) [[id number]]  (Normal) Collected: 08/26/24 0954    Lab Status: Final result Specimen: Blood from Arm, Left Updated: 08/26/24 1033     hs TnI 0hr 14 ng/L     HS Troponin I 2hr [[id number]]     Lab Status: No result Specimen: Blood     Lipid Panel with " Direct LDL reflex [[id number]]  (Abnormal) Collected: 08/26/24 0954    Lab Status: Final result Specimen: Blood from Arm, Left Updated: 08/26/24 1028     Cholesterol 180 mg/dL      Triglycerides 114 mg/dL      HDL, Direct 45 mg/dL      LDL Calculated 112 mg/dL     Comprehensive metabolic panel [[id number]] Collected: 08/26/24 0954    Lab Status: Final result Specimen: Blood from Arm, Left Updated: 08/26/24 1027     Sodium 139 mmol/L      Potassium 3.7 mmol/L      Chloride 104 mmol/L      CO2 29 mmol/L      ANION GAP 6 mmol/L      BUN 16 mg/dL      Creatinine 0.91 mg/dL      Glucose 91 mg/dL      Calcium 9.2 mg/dL      AST 16 U/L      ALT 13 U/L      Alkaline Phosphatase 92 U/L      Total Protein 6.8 g/dL      Albumin 3.8 g/dL      Total Bilirubin 0.48 mg/dL      eGFR 60 ml/min/1.73sq m     Narrative:      National Kid[employer] guidelines for Chronic Kidney Disease (CKD):     Stage 1 with normal or high GFR (GFR > 90 mL/min/1.73 square meters)    Stage 2 Mild CKD (GFR = 60-89 mL/min/1.73 square meters)    Stage 3A Moderate CKD (GFR = 45-59 mL/min/1.73 square meters)    Stage 3B Moderate CKD (GFR = 30-44 mL/min/1.73 square meters)    Stage 4 Severe CKD (GFR = 15-29 mL/min/1.73 square meters)    Stage 5 End Stage CKD (GFR <15 mL/min/1.73 square meters)  Note: GFR calculation is accurate only with a steady state creatinine    HS Troponin 0hr (reflex protocol) [[id number]]     Lab Status: No result Specimen: Blood     CBC and differential [[id number]] Collected: 08/26/24 0954    Lab Status: Final result Specimen: Blood from Arm, Left Updated: 08/26/24 1007     WBC 6.68 Thousand/uL      RBC 4.00 Million/uL      Hemoglobin 12.4 g/dL      Hematocrit 38.5 %      MCV 96 fL      MCH 31.0 pg      MCHC 32.2 g/dL      RDW 12.9 %      MPV 11.7 fL      Platelets 284 Thousands/uL      nRBC 0 /100 WBCs      Segmented % 66 %      Immature Grans % 0 %      Lymphocytes % 22 %      Monocytes % 9 %      Eosinophils Relative 3 %       Basophils Relative 0 %      Absolute Neutrophils 4.35 Thousands/µL      Absolute Immature Grans 0.02 Thousand/uL      Absolute Lymphocytes 1.44 Thousands/µL      Absolute Monocytes 0.63 Thousand/µL      Eosinophils Absolute 0.21 Thousand/µL      Basophils Absolute 0.03 Thousands/µL                    XR chest 1 view portable    (Results Pending)         Procedures  ECG 12 Lead Documentation Only    Date/Time: 8/26/2024 12:54 PM    Performed by: Roz Will MD  Authorized by: Roz Will MD    Indications / Diagnosis:  Chest pain  ECG reviewed by me, the ED Provider: yes    Patient location:  ED  Previous ECG:     Previous ECG:  Compared to current    Similarity:  Changes noted  Interpretation:     Interpretation: abnormal    Rate:     ECG rate:  68    ECG rate assessment: normal    Rhythm:     Rhythm: sinus rhythm    Ectopy:     Ectopy: none    QRS:     QRS axis:  Normal    QRS intervals:  Normal  Conduction:     Conduction: normal    ST segments:     ST segments:  Normal  T waves:     T waves: peaked and inverted      T waves comment:  Peaked in anterior leads, inverted in lateral leads    Conscious Sedation Assessment      Flowsheet Row Classification Score   ASA Scale Assessment 2-Mild to moderate systemic disease, medically well controlled, with no functional limitation filed at 08/26/2024 1116   Mallampati Classification Class II: soft palate, uvula, fauces visible - No Difficulty filed at 08/26/2024 1116            ED Course               Identification of Seniors at Risk      Flowsheet Row Most Recent Value   (ISAR) Identification of Seniors at Risk    Before the illness or injury that brought you to the Emergency, did you need someone to help you on a regular basis? 0 Filed at: 08/26/2024 0928   In the last 24 hours, have you needed more help than usual? 0 Filed at: 08/26/2024 0928   Have you been hospitalized for one or more nights during the past 6 months? 0 Filed at: 08/26/2024 0928   In  general, do you see well? 0 Filed at: 08/26/2024 0928   In general, do you have serious problems with your memory? 1 Filed at: 08/26/2024 0928   Do you take more than three different medications every day? 0 Filed at: 08/26/2024 0928   ISAR Score 1 Filed at: 08/26/2024 0928                                Medical Decision Making  Luis Kelly is a 79 y.o. who presents with complaints of chest pain, ongoing for several days     Vital signs are stable, afebrile  PE: WNL    Ddx: concerned for ACS based on triage EKG and history vs. Stable angina vs. Musculoskeletal chest pain  Lower clinical suspicion for PE, TAD, or arrhythmia.     Plan: labs as above  Case discussed with Cardiology- recommend admission for cardiac catheterization later today.    Disposition: Discussed with [facility]. Patient admitted inpatient for further evaluation by cardiology      Amount and/or Complexity of Data Reviewed  Labs: ordered.    Risk  Decision regarding hospitalization.          Disposition  Final diagnoses:   Chest pain     Time reflects when diagnosis was documented in both MDM as applicable and the Disposition within this note       Time User Action Codes Description Comment    8/26/2024 10:18 AM Krysten Ceballos Add [R07.9] Chest pain     8/26/2024 10:24 AM Roz Will Modify [R07.9] Chest pain     8/26/2024 10:25 AM Gabriella Hussein Modify [R07.9] Chest pain           ED Disposition       ED Disposition   Admit    Condition   Stable    Date/Time   Mon Aug 26, 2024 1024    Comment   Case was discussed with cardiology and the patient's admission status was agreed to be Admission Status: inpatient status to the service of Dr. Aponte, cardiology following. Plan for cath today.                Follow-up Information    None         Current Discharge Medication List        CONTINUE these medications which have NOT CHANGED    Details   aspirin 81 MG tablet Take 1 tablet by mouth daily      Cholecalciferol (VITAMIN D3) 2000 units capsule  Take 1 tablet by mouth daily      ketoconazole (NIZORAL) 2 % cream Apply topically daily Twice a day to affected areas on forehead when having an active flare and twice a week for maintenance.  Qty: 60 g, Refills: 2    Associated Diagnoses: Seborrheic dermatitis      Magnesium 250 MG TABS Take 250 tablets by mouth daily as needed      metroNIDAZOLE (METROGEL) 0.75 % gel Apply topically 2 (two) times a day  Qty: 45 g, Refills: 5    Associated Diagnoses: Rosacea      Multiple Vitamin-Folic Acid TABS Take 1 tablet by mouth daily      Psyllium 28.3 % POWD Take by mouth      solifenacin (VESICARE) 10 MG tablet Take 1 tablet (10 mg total) by mouth daily  Qty: 90 tablet, Refills: 3    Associated Diagnoses: Urge incontinence of urine      valsartan-hydrochlorothiazide (DIOVAN-HCT) 320-25 MG per tablet Take 1 tablet by mouth daily  Qty: 90 tablet, Refills: 3    Associated Diagnoses: Essential hypertension           No discharge procedures on file.    PDMP Review       None             ED Provider  Attending physically available and evaluated Luis Kelly. I managed the patient along with the ED Attending.    Electronically Signed by           Roz Will MD  08/26/24 1399

## 2024-08-27 ENCOUNTER — APPOINTMENT (INPATIENT)
Dept: RADIOLOGY | Facility: HOSPITAL | Age: 79
DRG: 287 | End: 2024-08-27
Payer: COMMERCIAL

## 2024-08-27 ENCOUNTER — APPOINTMENT (INPATIENT)
Dept: NON INVASIVE DIAGNOSTICS | Facility: HOSPITAL | Age: 79
DRG: 287 | End: 2024-08-27
Payer: COMMERCIAL

## 2024-08-27 LAB
ANION GAP SERPL CALCULATED.3IONS-SCNC: 7 MMOL/L (ref 4–13)
AORTIC ROOT: 2.5 CM
APICAL FOUR CHAMBER EJECTION FRACTION: 68 %
ASCENDING AORTA: 3 CM
AV LVOT MEAN GRADIENT: 2 MMHG
AV LVOT PEAK GRADIENT: 4 MMHG
BSA FOR ECHO PROCEDURE: 1.67 M2
BUN SERPL-MCNC: 18 MG/DL (ref 5–25)
CALCIUM SERPL-MCNC: 8.4 MG/DL (ref 8.4–10.2)
CHLORIDE SERPL-SCNC: 108 MMOL/L (ref 96–108)
CO2 SERPL-SCNC: 26 MMOL/L (ref 21–32)
CREAT SERPL-MCNC: 0.84 MG/DL (ref 0.6–1.3)
DOP CALC LVOT AREA: 2.83 CM2
DOP CALC LVOT CARDIAC INDEX: 2.45 L/MIN/M2
DOP CALC LVOT CARDIAC OUTPUT: 4.12 L/MIN
DOP CALC LVOT DIAMETER: 1.9 CM
DOP CALC LVOT PEAK VEL VTI: 21.56 CM
DOP CALC LVOT PEAK VEL: 0.96 M/S
DOP CALC LVOT STROKE INDEX: 37.5 ML/M2
DOP CALC LVOT STROKE VOLUME: 61.1
DOP CALC RVOT PEAK VEL: 0.66 M/S
DOP CALC RVOT VTI: 16.13 CM
E WAVE DECELERATION TIME: 168 MS
E/A RATIO: 1.39
ERYTHROCYTE [DISTWIDTH] IN BLOOD BY AUTOMATED COUNT: 13.1 % (ref 11.6–15.1)
FRACTIONAL SHORTENING: 36 (ref 28–44)
GFR SERPL CREATININE-BSD FRML MDRD: 66 ML/MIN/1.73SQ M
GLUCOSE SERPL-MCNC: 90 MG/DL (ref 65–140)
HCT VFR BLD AUTO: 32.1 % (ref 34.8–46.1)
HGB BLD-MCNC: 10.5 G/DL (ref 11.5–15.4)
INTERVENTRICULAR SEPTUM IN DIASTOLE (PARASTERNAL SHORT AXIS VIEW): 1 CM
INTERVENTRICULAR SEPTUM: 1 CM (ref 0.6–1.1)
LAAS-AP2: 15.6 CM2
LAAS-AP4: 17.1 CM2
LEFT ATRIUM SIZE: 3.2 CM
LEFT ATRIUM VOLUME (MOD BIPLANE): 41 ML
LEFT ATRIUM VOLUME INDEX (MOD BIPLANE): 24.4 ML/M2
LEFT INTERNAL DIMENSION IN SYSTOLE: 2.7 CM (ref 2.1–4)
LEFT VENTRICULAR INTERNAL DIMENSION IN DIASTOLE: 4.2 CM (ref 3.5–6)
LEFT VENTRICULAR POSTERIOR WALL IN END DIASTOLE: 1 CM
LEFT VENTRICULAR STROKE VOLUME: 52 ML
LVSV (TEICH): 52 ML
MAIN PULMONARY ARTERY: 2.3 CM
MCH RBC QN AUTO: 30.9 PG (ref 26.8–34.3)
MCHC RBC AUTO-ENTMCNC: 32.7 G/DL (ref 31.4–37.4)
MCV RBC AUTO: 94 FL (ref 82–98)
MV E'TISSUE VEL-SEP: 8 CM/S
MV PEAK A VEL: 0.72 M/S
MV PEAK E VEL: 100 CM/S
MV STENOSIS PRESSURE HALF TIME: 49 MS
MV VALVE AREA P 1/2 METHOD: 4.49
PLATELET # BLD AUTO: 202 THOUSANDS/UL (ref 149–390)
PMV BLD AUTO: 11.2 FL (ref 8.9–12.7)
POTASSIUM SERPL-SCNC: 3.8 MMOL/L (ref 3.5–5.3)
RBC # BLD AUTO: 3.4 MILLION/UL (ref 3.81–5.12)
RIGHT ATRIAL 2D VOLUME: 32 ML
RIGHT ATRIUM AREA SYSTOLE A4C: 13.4 CM2
RIGHT VENTRICLE ID DIMENSION: 2.4 CM
SL CV LEFT ATRIUM LENGTH A2C: 5 CM
SL CV LV EF: 70
SL CV PED ECHO LEFT VENTRICLE DIASTOLIC VOLUME (MOD BIPLANE) 2D: 80 ML
SL CV PED ECHO LEFT VENTRICLE SYSTOLIC VOLUME (MOD BIPLANE) 2D: 28 ML
SL CV RVOT MAX GRADIENT: 2 MMHG
SL CV RVOT MEAN GRADIENT: 1 MMHG
SL CV RVOT VMEAN: 0.45 M/S
SODIUM SERPL-SCNC: 141 MMOL/L (ref 135–147)
TR MAX PG: 37 MMHG
TR PEAK VELOCITY: 3.1 M/S
TRICUSPID ANNULAR PLANE SYSTOLIC EXCURSION: 2.1 CM
TRICUSPID VALVE PEAK REGURGITATION VELOCITY: 3.05 M/S
WBC # BLD AUTO: 6.72 THOUSAND/UL (ref 4.31–10.16)

## 2024-08-27 PROCEDURE — 93306 TTE W/DOPPLER COMPLETE: CPT | Performed by: INTERNAL MEDICINE

## 2024-08-27 PROCEDURE — 99232 SBSQ HOSP IP/OBS MODERATE 35: CPT | Performed by: INTERNAL MEDICINE

## 2024-08-27 PROCEDURE — 99222 1ST HOSP IP/OBS MODERATE 55: CPT | Performed by: INTERNAL MEDICINE

## 2024-08-27 PROCEDURE — 93306 TTE W/DOPPLER COMPLETE: CPT

## 2024-08-27 PROCEDURE — 71275 CT ANGIOGRAPHY CHEST: CPT

## 2024-08-27 PROCEDURE — 80048 BASIC METABOLIC PNL TOTAL CA: CPT | Performed by: STUDENT IN AN ORGANIZED HEALTH CARE EDUCATION/TRAINING PROGRAM

## 2024-08-27 PROCEDURE — 85027 COMPLETE CBC AUTOMATED: CPT | Performed by: STUDENT IN AN ORGANIZED HEALTH CARE EDUCATION/TRAINING PROGRAM

## 2024-08-27 RX ORDER — METOPROLOL SUCCINATE 25 MG/1
12.5 TABLET, EXTENDED RELEASE ORAL DAILY
Status: DISCONTINUED | OUTPATIENT
Start: 2024-08-27 | End: 2024-08-27

## 2024-08-27 RX ORDER — METOPROLOL SUCCINATE 25 MG/1
25 TABLET, EXTENDED RELEASE ORAL DAILY
Status: DISCONTINUED | OUTPATIENT
Start: 2024-08-28 | End: 2024-08-28 | Stop reason: HOSPADM

## 2024-08-27 RX ADMIN — OXYBUTYNIN 10 MG: 10 TABLET, FILM COATED, EXTENDED RELEASE ORAL at 09:04

## 2024-08-27 RX ADMIN — ATORVASTATIN CALCIUM 40 MG: 40 TABLET, FILM COATED ORAL at 17:50

## 2024-08-27 RX ADMIN — HYDROCHLOROTHIAZIDE 25 MG: 25 TABLET ORAL at 09:04

## 2024-08-27 RX ADMIN — HEPARIN SODIUM 5000 UNITS: 5000 INJECTION INTRAVENOUS; SUBCUTANEOUS at 06:10

## 2024-08-27 RX ADMIN — ASPIRIN 81 MG CHEWABLE TABLET 81 MG: 81 TABLET CHEWABLE at 09:04

## 2024-08-27 RX ADMIN — HEPARIN SODIUM 5000 UNITS: 5000 INJECTION INTRAVENOUS; SUBCUTANEOUS at 21:11

## 2024-08-27 RX ADMIN — IOHEXOL 75 ML: 350 INJECTION, SOLUTION INTRAVENOUS at 18:08

## 2024-08-27 RX ADMIN — LOSARTAN POTASSIUM 100 MG: 50 TABLET, FILM COATED ORAL at 09:04

## 2024-08-27 RX ADMIN — METOPROLOL SUCCINATE 12.5 MG: 25 TABLET, EXTENDED RELEASE ORAL at 17:50

## 2024-08-27 NOTE — PROGRESS NOTES
[facility]  Progress Note  Name: Luis Kelly I  MRN: [id number]  Unit/Bed#: PPHP-326-01 I Date of Admission: 8/26/2024   Date of Service: 8/27/2024 I Hospital Day: 1    Assessment & Plan   * Chest discomfort  Assessment & Plan  Patient presented with chest pain during exertion and associated nausea and pain/numbness on her bilateral arms and neck  Patient had a suspected viral illness approximately 3 weeks ago  S/p cardiac catheterization which showed mild nonobstructive coronary disease  Continue aspirin, Lipitor  2D echo reveals EF 70%  Discussed with cardiology given recent history of possible viral infection will check CTA chest to rule out pulm embolism  Cardiology inputs noted      Subclinical hypothyroidism  Assessment & Plan   Latest Reference Range & Units 08/26/24 09:54   TSH 3RD GENERATON 0.450 - 4.500 uIU/mL 4.871 (H)   FREE T4 0.61 - 1.12 ng/dL 0.89   Not on medications at home  Check T4 TSH in 4 to 6 weeks and follow-up with primary care physician    Hypertension  Assessment & Plan  Continue ARB/HCTZ  Monitor blood pressures  Avoid hypotension                 VTE Pharmacologic Prophylaxis: VTE Score: 3 Moderate Risk (Score 3-4) - Pharmacological DVT Prophylaxis Ordered: heparin.    Mobility:   Basic Mobility Inpatient Raw Score: 24  JH-HLM Goal: 8: Walk 250 feet or more  JH-HLM Achieved: 7: Walk 25 feet or more  JH-HLM Goal achieved. Continue to encourage appropriate mobility.    Patient Centered Rounds: I performed bedside rounds with nursing staff today.   Discussions with Specialists or Other Care Team Provider: Cardiology, case management    Education and Discussions with Family / Patient:  Discussed with the patient, spouse at bedside updated questions answered.     Total Time Spent on Date of Encounter in care of patient: 32 mins. This time was spent on one or more of the following: performing physical exam; counseling and coordination of care;  obtaining or reviewing history; documenting in the medical record; reviewing/ordering tests, medications or procedures; communicating with other healthcare professionals and discussing with patient's family/caregivers.    Current Length of Stay: 1 day(s)  Current Patient Status: Inpatient   Certification Statement: The patient will continue to require additional inpatient hospital stay due to as outlined  Discharge Plan:  Follow-up CT chest, cardiology inputs for disposition planning possible discharge 24 to 48 hours    Code Status: Level 1 - Full Code    Subjective:     Comfortably sitting up in bed  Reports feeling okay  Reports exertional chest pain  History chart labs medications reviewed      Objective:     Vitals:   Temp (24hrs), Av °F (36.7 °C), Min:97.8 °F (36.6 °C), Max:98.2 °F (36.8 °C)    Temp:  [97.8 °F (36.6 °C)-98.2 °F (36.8 °C)] 98.2 °F (36.8 °C)  HR:  [65-74] 68  Resp:  [13-20] 17  BP: (128-150)/(49-64) 134/58  SpO2:  [94 %-97 %] 96 %  Body mass index is 28.15 kg/m².     Input and Output Summary (last 24 hours):     Intake/Output Summary (Last 24 hours) at 2024 1708  Last data filed at 2024 1118  Gross per 24 hour   Intake 852 ml   Output 2150 ml   Net -1298 ml       Physical Exam:   Physical Exam     Sitting up in bed  Neck supple  Lungs diminished breath sounds at the bases  Vesicular breath sounds  Heart sounds S1-S2 noted  Abdomen soft  Awake follows commands  No pedal edema  No rash      Additional Data:     Labs:  Results from last 7 days   Lab Units 24  0454 24  0954   WBC Thousand/uL 6.72 6.68   HEMOGLOBIN g/dL 10.5* 12.4   HEMATOCRIT % 32.1* 38.5   PLATELETS Thousands/uL 202 284   SEGS PCT %  --  66   LYMPHO PCT %  --  22   MONO PCT %  --  9   EOS PCT %  --  3     Results from last 7 days   Lab Units 24  0454 24  0954   SODIUM mmol/L 141 139   POTASSIUM mmol/L 3.8 3.7   CHLORIDE mmol/L 108 104   CO2 mmol/L 26 29   BUN mg/dL 18 16   CREATININE mg/dL 0.84  0.91   ANION GAP mmol/L 7 6   CALCIUM mg/dL 8.4 9.2   ALBUMIN g/dL  --  3.8   TOTAL BILIRUBIN mg/dL  --  0.48   ALK PHOS U/L  --  92   ALT U/L  --  13   AST U/L  --  16   GLUCOSE RANDOM mg/dL 90 91                       Lines/Drains:  Invasive Devices       Peripheral Intravenous Line  Duration             Peripheral IV 08/26/24 Right Antecubital 1 day                      Telemetry:  Telemetry Orders (From admission, onward)               24 Hour Telemetry Monitoring  Continuous x 24 Hours (Telem)        Question:  Reason for 24 Hour Telemetry  Answer:  PCI/EP study (including pacer and ICD implementation), Cardiac surgery, MI, abnormal cardiac cath, and chest pain- rule out MI                     Telemetry Reviewed:  Sinus rhythm  Indication for Continued Telemetry Use: Acute MI/Unstable Angina/Rule out ACS             Imaging: Reviewed radiology reports from this admission including: procedure reports and ECHO    Recent Cultures (last 7 days):         Last 24 Hours Medication List:   Current Facility-Administered Medications   Medication Dose Route Frequency Provider Last Rate    acetaminophen  650 mg Oral Q4H PRN Lavelle Waller MD      aspirin  81 mg Oral Daily Mariela Kenyon MD      atorvastatin  40 mg Oral After Dinner Lavelle Waller MD      heparin (porcine)  5,000 Units Subcutaneous Q8H Mariela Kenyon MD      losartan  100 mg Oral Daily Mariela Kenyon MD      And    hydroCHLOROthiazide  25 mg Oral Daily Mariela Kenyon MD      metoprolol succinate  12.5 mg Oral Daily Janeth West MD      ondansetron  4 mg Intravenous Q6H PRN Lavelle Waller MD      oxybutynin  10 mg Oral Daily Mariela Kenyon MD      polyethylene glycol  17 g Oral Daily Lavelle Waller MD      senna  1 tablet Oral HS PRN Mariela Kenyon MD          Today, Patient Was Seen By: Flavia Lester MD    **Please Note: This note may have been constructed using a voice recognition system.**

## 2024-08-27 NOTE — ASSESSMENT & PLAN NOTE
Latest Reference Range & Units 08/26/24 09:54   TSH 3RD GENERATON 0.450 - 4.500 uIU/mL 4.871 (H)   FREE T4 0.61 - 1.12 ng/dL 0.89   Not on medications at home  Check T4 TSH in 4 to 6 weeks and follow-up with primary care physician

## 2024-08-27 NOTE — CONSULTS
Reason for Consult / Principal Problem:Chest Discomfort    Physician Requesting Consult:  Flavia Lester, *    Cardiologist: Dr. Jeanmarie Melgar      Assessment and Plan      Current Problem List   Principal Problem:    Chest discomfort  Active Problems:    Hypertension    Subclinical hypothyroidism    Urge incontinence of urine    Assessment/Plan:  Luis Kelly is a(n) 79 y.o. female with PMH of HTN, HLD, palpitations 2/2 PACs, admitted for evaluation of chest pain s/p cardiac catheterization which revealed mild nonobstructive coronary disease. Echo showed 70% EF, no wall abnormality, and new PFO.     Plan:  Chest pain  Continue Aspirin 81mg daily  Metoprolol 12.5mg BID  HTN  Cozaar 100mg daily  HLD  Atorvastatin 40mg daily        Subjective     CC: Chest discomfort    HPI: Luis Kelly 79 y.o. year old female with PMH of HTN, HLD, palpitations 2/2 PACs, who presents with chest pain ongoing for several days. Chest pain began while on her routine walk 8 days ago and was described as pressure which was severe enough to make her stop walking and return home. Patient went for a walk the next day and pain worsened, radiating up neck and down B/L arms with associated nausea, no vomiting. Patient reports symptoms occurred while going up an incline and improved as the inclined leveled. Patient denied dizziness, palpitations, syncope, and leg cramps during these episodes. Patient denies previous similar symptoms however did note becoming more easily winded on her walks over the last few weeks. Patient was prompted by her daughter, who is a PA with vascular at [facility], to come to the emergency room.      Family History:   Family History   Problem Relation Age of Onset    Hypertension Mother     Dementia Mother     Heart disease Mother     Hearing loss Mother     Coronary artery disease Father     Diverticulitis Father         Colon    Stroke Father         Syndrome    Venous thrombosis Father         of the  deep Vessels of the distal lower extremity    Hypertension Father     Heart disease Father     Diabetes Brother     Lung cancer Brother 50    Venous thrombosis Brother         of the deep vessels of the distal Lower extremity    Melanoma Brother     Hypertension Brother     Heart disease Brother     Cancer Brother     Diabetes Paternal Grandfather     Lung cancer Family         Adenocarcinoma of the Lung    Other Family         Disorders of Blood and Blood-forming Organs, Skin Disorder    Heart disease Family     Colon cancer Paternal Aunt     No Known Problems Daughter     No Known Problems Maternal Grandmother     No Known Problems Maternal Grandfather     No Known Problems Paternal Grandmother        Historical Information   Past Medical History:   Diagnosis Date    Allergic     pennicillin and Dexamethazone    Basal cell carcinoma 1991    Cancer (HCC) 2001    Basal cell on right cheek    Diverticulitis of colon     Hypertension     Osteoporosis     Rosacea     Skin tag     Varicella      Past Surgical History:   Procedure Laterality Date    ADENOIDECTOMY      BREAST CYST EXCISION Left 1989    BREAST CYST EXCISION Left 1989    BREAST CYST EXCISION Right 1980    CARDIAC CATHETERIZATION Left 8/26/2024    Procedure: Cardiac Left Heart Cath;  Surgeon: Juan J Quiroz DO;  Location: [facility];  Service: Cardiology    CARDIAC CATHETERIZATION N/A 8/26/2024    Procedure: Cardiac Coronary Angiogram;  Surgeon: Juan J Quiroz DO;  Location: [facility];  Service: Cardiology    CARDIAC CATHETERIZATION  8/26/2024    Procedure: Cardiac catheterization;  Surgeon: Juan J Quiroz DO;  Location: [facility];  Service: Cardiology    HEMORRHOID SURGERY      INCISIONAL BREAST BIOPSY Left     OTHER SURGICAL HISTORY      Arthrocentesis Aspiration of Ganglion Cyst    TONSILLECTOMY      UTERINE FIBROID EMBOLIZATION       Social History   Social History     Substance and Sexual Activity    Alcohol Use Yes    Alcohol/week: 1.0 standard drink of alcohol    Types: 1 Glasses of wine per week    Comment: Rare     Social History     Substance and Sexual Activity   Drug Use No     Social History     Tobacco Use   Smoking Status Never    Passive exposure: Past   Smokeless Tobacco Never     Family History:   Family History   Problem Relation Age of Onset    Hypertension Mother     Dementia Mother     Heart disease Mother     Hearing loss Mother     Coronary artery disease Father     Diverticulitis Father         Colon    Stroke Father         Syndrome    Venous thrombosis Father         of the deep Vessels of the distal lower extremity    Hypertension Father     Heart disease Father     Diabetes Brother     Lung cancer Brother 50    Venous thrombosis Brother         of the deep vessels of the distal Lower extremity    Melanoma Brother     Hypertension Brother     Heart disease Brother     Cancer Brother     Diabetes Paternal Grandfather     Lung cancer Family         Adenocarcinoma of the Lung    Other Family         Disorders of Blood and Blood-forming Organs, Skin Disorder    Heart disease Family     Colon cancer Paternal Aunt     No Known Problems Daughter     No Known Problems Maternal Grandmother     No Known Problems Maternal Grandfather     No Known Problems Paternal Grandmother        Review of Systems:  Review of Systems   Respiratory:  Negative for chest tightness and shortness of breath.    Cardiovascular:  Negative for chest pain, palpitations and leg swelling.   Neurological:  Negative for dizziness and syncope.       Scheduled Meds:  Current Facility-Administered Medications   Medication Dose Route Frequency Provider Last Rate    acetaminophen  650 mg Oral Q4H PRN Lavelle Waller MD      aspirin  81 mg Oral Daily Mariela Kenyon MD      atorvastatin  40 mg Oral After Dinner Lavelle Waller MD      heparin (porcine)  5,000 Units Subcutaneous Q8H Mariela Kenyon MD      losartan  100 mg Oral  "Daily Mariela Kenyon MD      And    hydroCHLOROthiazide  25 mg Oral Daily Mariela Kenyon MD      ondansetron  4 mg Intravenous Q6H PRN Lavelle Waller MD      oxybutynin  10 mg Oral Daily Mariela Kenyon MD      polyethylene glycol  17 g Oral Daily Lavelle Waller MD      senna  1 tablet Oral HS PRN Mariela Kenyon MD         PRN Meds:    acetaminophen    ondansetron    senna  all current active meds have been reviewed and current meds:   Current Facility-Administered Medications   Medication Dose Route Frequency    acetaminophen (TYLENOL) tablet 650 mg  650 mg Oral Q4H PRN    aspirin chewable tablet 81 mg  81 mg Oral Daily    atorvastatin (LIPITOR) tablet 40 mg  40 mg Oral After Dinner    heparin (porcine) subcutaneous injection 5,000 Units  5,000 Units Subcutaneous Q8H    losartan (COZAAR) tablet 100 mg  100 mg Oral Daily    And    hydroCHLOROthiazide tablet 25 mg  25 mg Oral Daily    ondansetron (ZOFRAN) injection 4 mg  4 mg Intravenous Q6H PRN    oxybutynin (DITROPAN-XL) 24 hr tablet 10 mg  10 mg Oral Daily    polyethylene glycol (MIRALAX) packet 17 g  17 g Oral Daily    senna (SENOKOT) tablet 8.6 mg  1 tablet Oral HS PRN       Allergies   Allergen Reactions    Dexamethasone Facial Swelling    Tetanus Antitoxin Hives    Penicillins Rash       Objective   Vitals: Temp (24hrs), Av.9 °F (36.6 °C), Min:97.8 °F (36.6 °C), Max:98 °F (36.7 °C)  Current: Temperature: 97.9 °F (36.6 °C)  Patient Vitals for the past 24 hrs:   BP Temp Temp src Pulse Resp SpO2 Height Weight   24 1116 146/55 97.9 °F (36.6 °C) -- 69 19 96 % -- --   24 0903 (!) 142/49 -- -- 74 -- 96 % -- --   24 0743 150/64 98 °F (36.7 °C) -- 70 20 96 % 5' 1\" (1.549 m) 67.6 kg (149 lb)   24 0343 128/51 97.9 °F (36.6 °C) -- 65 13 97 % -- --   24 0005 -- -- -- -- -- 97 % -- --   24 2343 144/63 97.8 °F (36.6 °C) -- 73 15 94 % -- --   24 2100 -- -- -- -- -- 95 % -- --   24 1457 (!) 110/45 97.8 °F (36.6 " "°C) Oral 65 16 95 % -- --   08/26/24 1400 130/53 -- -- 66 -- 97 % -- --   08/26/24 1353 124/56 -- -- 66 -- 96 % -- --    Body mass index is 28.15 kg/m².  Orthostatic Blood Pressures      Flowsheet Row Most Recent Value   Blood Pressure 146/55 filed at 08/27/2024 1116   Patient Position - Orthostatic VS Lying filed at 08/26/2024 1457              Invasive Devices       Peripheral Intravenous Line  Duration             Peripheral IV 08/26/24 Right Antecubital 1 day                    Physical Exam:  Physical Exam  Constitutional:       Appearance: Normal appearance.   Eyes:      Extraocular Movements: Extraocular movements intact.      Conjunctiva/sclera: Conjunctivae normal.   Cardiovascular:      Rate and Rhythm: Normal rate and regular rhythm.      Heart sounds: Normal heart sounds.   Pulmonary:      Effort: Pulmonary effort is normal.      Breath sounds: Normal breath sounds.   Neurological:      Mental Status: She is alert.         Lab Results:   Results from last 7 days   Lab Units 08/27/24  0454 08/26/24  0954   WBC Thousand/uL 6.72 6.68   HEMOGLOBIN g/dL 10.5* 12.4   HEMATOCRIT % 32.1* 38.5   PLATELETS Thousands/uL 202 284   SEGS PCT %  --  66   MONO PCT %  --  9   EOS PCT %  --  3      Results from last 7 days   Lab Units 08/27/24  0454 08/26/24  0954   SODIUM mmol/L 141 139   POTASSIUM mmol/L 3.8 3.7   CHLORIDE mmol/L 108 104   CO2 mmol/L 26 29   BUN mg/dL 18 16   CREATININE mg/dL 0.84 0.91   CALCIUM mg/dL 8.4 9.2   ALK PHOS U/L  --  92   ALT U/L  --  13   AST U/L  --  16   EGFR ml/min/1.73sq m 66 60         Lab Results   Component Value Date    HEPCAB NON-REACTIVE 08/12/2019     No results found for: \"SPEP\", \"UPEP\" No results found for: \"HGBA1C\"  Lab Results   Component Value Date    CHOL 195 09/12/2017    CHOL 201 (H) 07/14/2016    CHOL 210 (H) 01/11/2016      Lab Results   Component Value Date    HDL 45 (L) 08/26/2024    HDL 56 08/29/2023    HDL 61 08/02/2022      Lab Results   Component Value Date    " LDLCALC 112 (H) 08/26/2024    LDLCALC 123 (H) 08/29/2023    LDLCALC 122 (H) 08/02/2022      Lab Results   Component Value Date    TRIG 114 08/26/2024    TRIG 117 08/29/2023    TRIG 92 08/02/2022         Yiama Salazar MD  08/27/24 1:36 PM

## 2024-08-27 NOTE — ASSESSMENT & PLAN NOTE
Patient presented with chest pain during exertion and associated nausea and pain/numbness on her bilateral arms and neck  Patient had a suspected viral illness approximately 3 weeks ago  S/p cardiac catheterization which showed mild nonobstructive coronary disease  Continue aspirin, Lipitor  2D echo reveals EF 70%  Discussed with cardiology given recent history of possible viral infection will check CTA chest to rule out pulm embolism  Cardiology inputs noted

## 2024-08-27 NOTE — CASE MANAGEMENT
Case Management Assessment & Discharge Planning Note    Patient name Luis Kelly  Location [facility]-01 MRN [id number]  : 1945 Date 2024       Current Admission Date: 2024  Current Admission Diagnosis:Chest discomfort   Patient Active Problem List    Diagnosis Date Noted Date Diagnosed    Chest discomfort 2024     Cyst of left breast 2024     Mild hyperlipidemia 2023     GERD without esophagitis 2022     Urge incontinence of urine 2021     Increased urinary frequency 2020     Episodic lightheadedness 2019     Subclinical hypothyroidism 2015     Vitamin D deficiency 2015     Palpitations 10/24/2014     Diverticulosis 10/14/2013     History of basal cell carcinoma (BCC) of skin 2013     Hypertension 2013     Osteopenia 2013     Rosacea 2013       LOS (days): 1  Geometric Mean LOS (GMLOS) (days): 1.7  Days to GMLOS:0.7     OBJECTIVE:    Risk of Unplanned Readmission Score: 9.32         Current admission status: Inpatient       Preferred Pharmacy:   [facility]/pharm[facility] #[id number] - [place] - [address]  [address] [address]  Phone: [phone] Fax: [phone]    Primary Care Provider: Shannon Reis DO    Primary Insurance: [facility]  Secondary Insurance:     ASSESSMENT:  Active Health Care Proxies    There are no active Health Care Proxies on file.                 Readmission Root Cause  30 Day Readmission: No    Patient Information  Admitted from:: Home  Mental Status: Alert  During Assessment patient was accompanied by: Spouse  Assessment information provided by:: Patient  Support Systems: Spouse/significant other  Type of Current Residence: [facility]  Living Arrangements: Lives w/ Spouse/significant other    Activities of Daily Living Prior to Admission  Functional Status: Independent  Completes ADLs independently?: Yes  Ambulates independently?: Yes  Does patient use assisted devices?:  No  Does patient currently own DME?: No  Does patient have a history of Outpatient Therapy (PT/OT)?: No  Does the patient have a history of Short-Term Rehab?: No  Does patient have a history of HHC?: No  Does patient currently have HHC?: No         Patient Information Continued  Does patient have prescription coverage?: Yes  Does patient have a history of substance abuse?: No  Does patient have a history of Mental Health Diagnosis?: No         Means of Transportation  Means of Transport to [facility]:: Drives Self      Social Determinants of Health (SDOH)      Flowsheet Row Most Recent Value   Housing Stability    In the last 12 months, was there a time when you were not able to pay the mortgage or rent on time? N   In the past 12 months, how many times have you moved where you were living? 0   At any time in the past 12 months, were you homeless or living in a shelter (including now)? N   Transportation Needs    In the past 12 months, has lack of transportation kept you from medical appointments or from getting medications? no   In the past 12 months, has lack of transportation kept you from meetings, work, or from getting things needed for daily living? No   Food Insecurity    Within the past 12 months, you worried that your food would run out before you got the money to buy more. Never true   Within the past 12 months, the food you bought just didn't last and you didn't have money to get more. Never true   Utilities    In the past 12 months has the electric, gas, oil, or water company threatened to shut off services in your home? No            DISCHARGE DETAILS:    Discharge planning discussed with:: patient &   Freedom of Choice: Yes     CM contacted family/caregiver?: Yes  Were Treatment Team discharge recommendations reviewed with patient/caregiver?: Yes  Did patient/caregiver verbalize understanding of patient care needs?: Yes  Were patient/caregiver advised of the risks associated with not following  Treatment Team discharge recommendations?: Yes    Contacts  Patient Contacts:  Jose  Relationship to Patient:: Family  Contact Method: Phone  Phone Number: [phone]  Reason/Outcome: Emergency Contact                              Transport at Discharge : Family

## 2024-08-27 NOTE — PLAN OF CARE
Problem: PAIN - ADULT  Goal: Verbalizes/displays adequate comfort level or baseline comfort level  Description: Interventions:  - Encourage patient to monitor pain and request assistance  - Assess pain using appropriate pain scale  - Administer analgesics based on type and severity of pain and evaluate response  - Implement non-pharmacological measures as appropriate and evaluate response  - Consider cultural and social influences on pain and pain management  - Notify physician/advanced practitioner if interventions unsuccessful or patient reports new pain  Outcome: Progressing     Problem: INFECTION - ADULT  Goal: Absence or prevention of progression during hospitalization  Description: INTERVENTIONS:  - Assess and monitor for signs and symptoms of infection  - Monitor lab/diagnostic results  - Monitor all insertion sites, i.e. indwelling lines, tubes, and drains  - Monitor endotracheal if appropriate and nasal secretions for changes in amount and color  - [place] appropriate cooling/warming therapies per order  - Administer medications as ordered  - Instruct and encourage patient and family to use good hand hygiene technique  - Identify and instruct in appropriate isolation precautions for identified infection/condition  Outcome: Progressing  Goal: Absence of fever/infection during neutropenic period  Description: INTERVENTIONS:  - Monitor WBC    Outcome: Progressing     Problem: SAFETY ADULT  Goal: Patient will remain free of falls  Description: INTERVENTIONS:  - Educate patient/family on patient safety including physical limitations  - Instruct patient to call for assistance with activity   - Consult OT/PT to assist with strengthening/mobility   - Keep Call bell within reach  - Keep bed low and locked with side rails adjusted as appropriate  - Keep care items and personal belongings within reach  - Initiate and maintain comfort rounds  - Make Fall Risk Sign visible to staff  - Offer Toileting every  Hours,  in advance of need  - Initiate/Maintain alarm  - Obtain necessary fall risk management equipment:   - Apply yellow socks and bracelet for high fall risk patients  - Consider moving patient to room near nurses station  Outcome: Progressing  Goal: Maintain or return to baseline ADL function  Description: INTERVENTIONS:  -  Assess patient's ability to carry out ADLs; assess patient's baseline for ADL function and identify physical deficits which impact ability to perform ADLs (bathing, care of mouth/teeth, toileting, grooming, dressing, etc.)  - Assess/evaluate cause of self-care deficits   - Assess range of motion  - Assess patient's mobility; develop plan if impaired  - Assess patient's need for assistive devices and provide as appropriate  - Encourage maximum independence but intervene and supervise when necessary  - Involve family in performance of ADLs  - Assess for home care needs following discharge   - Consider OT consult to assist with ADL evaluation and planning for discharge  - Provide patient education as appropriate  Outcome: Progressing  Goal: Maintains/Returns to pre admission functional level  Description: INTERVENTIONS:  - Perform AM-PAC 6 Click Basic Mobility/ Daily Activity assessment daily.  - Set and communicate daily mobility goal to care team and patient/family/caregiver.   - Collaborate with rehabilitation services on mobility goals if consulted  - Perform Range of Motion  times a day.  - Reposition patient every  hours.  - Dangle patient  times a day  - Stand patient  times a day  - Ambulate patient  times a day  - Out of bed to chair  times a day   - Out of bed for meals  times a day  - Out of bed for toileting  - Record patient progress and toleration of activity level   Outcome: Progressing     Problem: DISCHARGE PLANNING  Goal: Discharge to home or other facility with appropriate resources  Description: INTERVENTIONS:  - Identify barriers to discharge w/patient and caregiver  - Arrange for  needed discharge resources and transportation as appropriate  - Identify discharge learning needs (meds, wound care, etc.)  - Arrange for interpretive services to assist at discharge as needed  - Refer to Case Management Department for coordinating discharge planning if the patient needs post-hospital services based on physician/advanced practitioner order or complex needs related to functional status, cognitive ability, or social support system  Outcome: Progressing     Problem: Knowledge Deficit  Goal: Patient/family/caregiver demonstrates understanding of disease process, treatment plan, medications, and discharge instructions  Description: Complete learning assessment and assess knowledge base.  Interventions:  - Provide teaching at level of understanding  - Provide teaching via preferred learning methods  Outcome: Progressing     Problem: NEUROSENSORY - ADULT  Goal: Achieves stable or improved neurological status  Description: INTERVENTIONS  - Monitor and report changes in neurological status  - Monitor vital signs such as temperature, blood pressure, glucose, and any other labs ordered   - Initiate measures to prevent increased intracranial pressure  - Monitor for seizure activity and implement precautions if appropriate      Outcome: Progressing  Goal: Remains free of injury related to seizures activity  Description: INTERVENTIONS  - Maintain airway, patient safety  and administer oxygen as ordered  - Monitor patient for seizure activity, document and report duration and description of seizure to physician/advanced practitioner  - If seizure occurs,  ensure patient safety during seizure  - Reorient patient post seizure  - Seizure pads on all 4 side rails  - Instruct patient/family to notify RN of any seizure activity including if an aura is experienced  - Instruct patient/family to call for assistance with activity based on nursing assessment  - Administer anti-seizure medications if ordered    Outcome:  Progressing  Goal: Achieves maximal functionality and self care  Description: INTERVENTIONS  - Monitor swallowing and airway patency with patient fatigue and changes in neurological status  - Encourage and assist patient to increase activity and self care.   - Encourage visually impaired, hearing impaired and aphasic patients to use assistive/communication devices  Outcome: Progressing     Problem: CARDIOVASCULAR - ADULT  Goal: Maintains optimal cardiac output and hemodynamic stability  Description: INTERVENTIONS:  - Monitor I/O, vital signs and rhythm  - Monitor for S/S and trends of decreased cardiac output  - Administer and titrate ordered vasoactive medications to optimize hemodynamic stability  - Assess quality of pulses, skin color and temperature  - Assess for signs of decreased coronary artery perfusion  - Instruct patient to report change in severity of symptoms  Outcome: Progressing  Goal: Absence of cardiac dysrhythmias or at baseline rhythm  Description: INTERVENTIONS:  - Continuous cardiac monitoring, vital signs, obtain 12 lead EKG if ordered  - Administer antiarrhythmic and heart rate control medications as ordered  - Monitor electrolytes and administer replacement therapy as ordered  Outcome: Progressing     Problem: RESPIRATORY - ADULT  Goal: Achieves optimal ventilation and oxygenation  Description: INTERVENTIONS:  - Assess for changes in respiratory status  - Assess for changes in mentation and behavior  - Position to facilitate oxygenation and minimize respiratory effort  - Oxygen administered by appropriate delivery if ordered  - Initiate smoking cessation education as indicated  - Encourage broncho-pulmonary hygiene including cough, deep breathe, Incentive Spirometry  - Assess the need for suctioning and aspirate as needed  - Assess and instruct to report SOB or any respiratory difficulty  - Respiratory Therapy support as indicated  Outcome: Progressing     Problem: GASTROINTESTINAL -  ADULT  Goal: Minimal or absence of nausea and/or vomiting  Description: INTERVENTIONS:  - Administer IV fluids if ordered to ensure adequate hydration  - Maintain NPO status until nausea and vomiting are resolved  - Nasogastric tube if ordered  - Administer ordered antiemetic medications as needed  - Provide nonpharmacologic comfort measures as appropriate  - Advance diet as tolerated, if ordered  - Consider nutrition services referral to assist patient with adequate nutrition and appropriate food choices  Outcome: Progressing  Goal: Maintains or returns to baseline bowel function  Description: INTERVENTIONS:  - Assess bowel function  - Encourage oral fluids to ensure adequate hydration  - Administer IV fluids if ordered to ensure adequate hydration  - Administer ordered medications as needed  - Encourage mobilization and activity  - Consider nutritional services referral to assist patient with adequate nutrition and appropriate food choices  Outcome: Progressing  Goal: Maintains adequate nutritional intake  Description: INTERVENTIONS:  - Monitor percentage of each meal consumed  - Identify factors contributing to decreased intake, treat as appropriate  - Assist with meals as needed  - Monitor I&O, weight, and lab values if indicated  - Obtain nutrition services referral as needed  Outcome: Progressing  Goal: Establish and maintain optimal ostomy function  Description: INTERVENTIONS:  - Assess bowel function  - Encourage oral fluids to ensure adequate hydration  - Administer IV fluids if ordered to ensure adequate hydration   - Administer ordered medications as needed  - Encourage mobilization and activity  - Nutrition services referral to assist patient with appropriate food choices  - Assess stoma site  - Consider wound care consult   Outcome: Progressing  Goal: Oral mucous membranes remain intact  Description: INTERVENTIONS  - Assess oral mucosa and hygiene practices  - Implement preventative oral hygiene  regimen  - Implement oral medicated treatments as ordered  - Initiate Nutrition services referral as needed  Outcome: Progressing     Problem: GENITOURINARY - ADULT  Goal: Maintains or returns to baseline urinary function  Description: INTERVENTIONS:  - Assess urinary function  - Encourage oral fluids to ensure adequate hydration if ordered  - Administer IV fluids as ordered to ensure adequate hydration  - Administer ordered medications as needed  - Offer frequent toileting  - Follow urinary retention protocol if ordered  Outcome: Progressing  Goal: Absence of urinary retention  Description: INTERVENTIONS:  - Assess patient’s ability to void and empty bladder  - Monitor I/O  - Bladder scan as needed  - Discuss with physician/AP medications to alleviate retention as needed  - Discuss catheterization for long term situations as appropriate  Outcome: Progressing  Goal: Urinary catheter remains patent  Description: INTERVENTIONS:  - Assess patency of urinary catheter  - If patient has a chronic wills, consider changing catheter if non-functioning  - Follow guidelines for intermittent irrigation of non-functioning urinary catheter  Outcome: Progressing     Problem: METABOLIC, FLUID AND ELECTROLYTES - ADULT  Goal: Electrolytes maintained within normal limits  Description: INTERVENTIONS:  - Monitor labs and assess patient for signs and symptoms of electrolyte imbalances  - Administer electrolyte replacement as ordered  - Monitor response to electrolyte replacements, including repeat lab results as appropriate  - Instruct patient on fluid and nutrition as appropriate  Outcome: Progressing  Goal: Fluid balance maintained  Description: INTERVENTIONS:  - Monitor labs   - Monitor I/O and WT  - Instruct patient on fluid and nutrition as appropriate  - Assess for signs & symptoms of volume excess or deficit  Outcome: Progressing  Goal: Glucose maintained within target range  Description: INTERVENTIONS:  - Monitor Blood Glucose as  ordered  - Assess for signs and symptoms of hyperglycemia and hypoglycemia  - Administer ordered medications to maintain glucose within target range  - Assess nutritional intake and initiate nutrition service referral as needed  Outcome: Progressing     Problem: SKIN/TISSUE INTEGRITY - ADULT  Goal: Skin Integrity remains intact(Skin Breakdown Prevention)  Description: Assess:  -Perform Lucio assessment every   -Clean and moisturize skin every   -Inspect skin when repositioning, toileting, and assisting with ADLS  -Assess under medical devices such as  every   -Assess extremities for adequate circulation and sensation     Bed Management:  -Have minimal linens on bed & keep smooth, unwrinkled  -Change linens as needed when moist or perspiring  -Avoid sitting or lying in one position for more than  hours while in bed  -Keep HOB at degrees     Toileting:  -Offer bedside commode  -Assess for incontinence every   -Use incontinent care products after each incontinent episode such as     Activity:  -Mobilize patient  times a day  -Encourage activity and walks on unit  -Encourage or provide ROM exercises   -Turn and reposition patient every  Hours  -Use appropriate equipment to lift or move patient in bed  -Instruct/ Assist with weight shifting every  when out of bed in chair  -Consider limitation of chair time  hour intervals    Skin Care:  -Avoid use of baby powder, tape, friction and shearing, hot water or constrictive clothing  -Relieve pressure over bony prominences using   -Do not massage red bony areas    Next Steps:  -Teach patient strategies to minimize risks such as    -Consider consults to  interdisciplinary teams such as   Outcome: Progressing  Goal: Incision(s), wounds(s) or drain site(s) healing without S/S of infection  Description: INTERVENTIONS  - Assess and document dressing, incision, wound bed, drain sites and surrounding tissue  - Provide patient and family education  - Perform skin care/dressing  changes every   Outcome: Progressing  Goal: Pressure injury heals and does not worsen  Description: Interventions:  - Implement low air loss mattress or specialty surface (Criteria met)  - Apply silicone foam dressing  - Instruct/assist with weight shifting every  minutes when in chair   - Limit chair time to  hour intervals  - Use special pressure reducing interventions such as  when in chair   - Apply fecal or urinary incontinence containment device   - Perform passive or active ROM every   - Turn and reposition patient & offload bony prominences every  hours   - Utilize friction reducing device or surface for transfers   - Consider consults to  interdisciplinary teams such as   - Use incontinent care products after each incontinent episode such as   - Consider nutrition services referral as needed  Outcome: Progressing     Problem: HEMATOLOGIC - ADULT  Goal: Maintains hematologic stability  Description: INTERVENTIONS  - Assess for signs and symptoms of bleeding or hemorrhage  - Monitor labs  - Administer supportive blood products/factors as ordered and appropriate  Outcome: Progressing     Problem: MUSCULOSKELETAL - ADULT  Goal: Maintain or return mobility to safest level of function  Description: INTERVENTIONS:  - Assess patient's ability to carry out ADLs; assess patient's baseline for ADL function and identify physical deficits which impact ability to perform ADLs (bathing, care of mouth/teeth, toileting, grooming, dressing, etc.)  - Assess/evaluate cause of self-care deficits   - Assess range of motion  - Assess patient's mobility  - Assess patient's need for assistive devices and provide as appropriate  - Encourage maximum independence but intervene and supervise when necessary  - Involve family in performance of ADLs  - Assess for home care needs following discharge   - Consider OT consult to assist with ADL evaluation and planning for discharge  - Provide patient education as appropriate  Outcome:  Progressing  Goal: Maintain proper alignment of affected body part  Description: INTERVENTIONS:  - Support, maintain and protect limb and body alignment  - Provide patient/ family with appropriate education  Outcome: Progressing     Problem: Nutrition/Hydration-ADULT  Goal: Nutrient/Hydration intake appropriate for improving, restoring or maintaining nutritional needs  Description: Monitor and assess patient's nutrition/hydration status for malnutrition. Collaborate with interdisciplinary team and initiate plan and interventions as ordered.  Monitor patient's weight and dietary intake as ordered or per policy. Utilize nutrition screening tool and intervene as necessary. Determine patient's food preferences and provide high-protein, high-caloric foods as appropriate.     INTERVENTIONS:  - Monitor oral intake, urinary output, labs, and treatment plans  - Assess nutrition and hydration status and recommend course of action  - Evaluate amount of meals eaten  - Assist patient with eating if necessary   - Allow adequate time for meals  - Recommend/ encourage appropriate diets, oral nutritional supplements, and vitamin/mineral supplements  - Order, calculate, and assess calorie counts as needed  - Recommend, monitor, and adjust tube feedings and TPN/PPN based on assessed needs  - Assess need for intravenous fluids  - Provide specific nutrition/hydration education as appropriate  - Include patient/family/caregiver in decisions related to nutrition  Outcome: Progressing

## 2024-08-27 NOTE — UTILIZATION REVIEW
Initial Clinical Review    Admission: Date/Time/Statement:   Admission Orders (From admission, onward)       Ordered        08/26/24 1028  INPATIENT ADMISSION  Once                          Orders Placed This Encounter   Procedures    INPATIENT ADMISSION     Standing Status:   Standing     Number of Occurrences:   1     Order Specific Question:   Level of Care     Answer:   Med Surg [16]     Order Specific Question:   Estimated length of stay     Answer:   More than 2 Midnights     Order Specific Question:   Certification     Answer:   I certify that inpatient services are medically necessary for this patient for a duration of greater than two midnights. See H&P and MD Progress Notes for additional information about the patient's course of treatment.     ED Arrival Information       Expected   -    Arrival   8/26/2024 09:22    Acuity   Emergent              Means of arrival   Walk-In    Escorted by   Family Member    Service   Hospitalist    Admission type   Emergency              Arrival complaint   Chest pain             Chief Complaint   Patient presents with    Chest Pain     Pt states during exertion she gets a crushing feeling across her upper chest, pt also complained of nausea, pain/numbness in her BL arms and neck. Pt states the last time this happened was 8/22 states she hasn't walked far since then and hasn't had that feeling. Denies SOB.        Initial Presentation: 79 y.o. female with a PMH of hypertension, basal cell carcinoma, hyperlipidemia, GERD and urinary incontinence presented to the ED from home w/ chest pain on exertion associated w/ nausea and b/l UE paresthesias. Reports suspected viral illness approximately 3 weeks ago.  In the ED, initial trop 14. EKG without ischemic findings but given high suspicion of ACS, taken to the cath lab for cardoac cath w/c revealed revealed mild nonobstructive CAD. Received high dose ASA.  On exam, currently chest pain free. Normal HR and rhythm.    Admit as  Inpatient for evaluation and treatment of chest discomfort.  Plan: Continue aspirin and Lipitor. TTE pending. D-dimer ordered. Will obtain CTA of chest to evaluate for PE. Continue telemetry    Anticipated Length of Stay/Certification Statement: Patient will be admitted on an inpatient basis with an anticipated length of stay of greater than 2 midnights secondary to TTE, monitor symptoms, chest imaging.     Date: 07/27   Day 2:   Cardiology Consult:chest pain s/p cardiac catheterization which revealed mild nonobstructive coronary disease. Echo showed 70% EF, no wall abnormality, and new PFO.    Plan; cont ASA, Metoprolol, cozaar, statin.    IM Notes: Pt reports exertional chest pain. S/p cardiac catheterization which showed mild nonobstructive coronary disease. 2D echo reveals EF 70%. Continue aspirin, Lipitor. will check CTA chest to rule out pulm embolism. Continue ARB/HCTZ. Monitor blood pressures      ED Triage Vitals [08/26/24 0926]   Temperature Pulse Respirations Blood Pressure SpO2 Pain Score   98.1 °F (36.7 °C) 76 18 131/86 98 % No Pain     Weight (last 2 days)       Date/Time Weight    08/27/24 07:43:56 67.6 (149)            Vital Signs (last 3 days)       Date/Time Temp Pulse Resp BP MAP (mmHg) SpO2 Calculated FIO2 (%) - Nasal Cannula Nasal Cannula O2 Flow Rate (L/min) O2 Device Patient Position - Orthostatic VS Pain    08/27/24 09:03:29 -- 74 -- 142/49 80 96 % -- -- -- -- --    08/27/24 07:43:56 98 °F (36.7 °C) 70 20 150/64 93 96 % -- -- -- -- --    08/27/24 03:43:29 97.9 °F (36.6 °C) 65 13 128/51 77 97 % -- -- -- -- --    08/27/24 0005 -- -- -- -- -- 97 % -- -- None (Room air) -- --    08/26/24 23:43:56 97.8 °F (36.6 °C) 73 15 144/63 90 94 % -- -- -- -- --    08/26/24 2100 -- -- -- -- -- 95 % -- -- None (Room air) -- --    08/26/24 2005 -- -- -- -- -- -- -- -- -- -- No Pain    08/26/24 1457 97.8 °F (36.6 °C) 65 16 110/45 67 95 % -- -- None (Room air) Lying --    08/26/24 14:00:54 -- 66 -- 130/53 79 97 %  -- -- -- -- --    08/26/24 13:53:22 -- 66 -- 124/56 79 96 % -- -- -- -- --    08/26/24 12:37:39 -- 61 -- 127/53 78 95 % -- -- None (Room air) -- No Pain    08/26/24 11:33:23 -- -- -- -- -- -- -- -- -- -- No Pain    08/26/24 11:16:01 -- -- -- -- -- -- -- -- -- -- No Pain    08/26/24 11:15:47 -- -- -- -- -- -- 28 2 L/min Nasal cannula -- --    08/26/24 1101 -- -- -- -- -- -- -- -- -- -- No Pain    08/26/24 1001 -- -- -- -- -- -- -- -- None (Room air) -- --    08/26/24 0927 -- -- -- -- -- 98 % -- -- -- -- --    08/26/24 0926 98.1 °F (36.7 °C) 76 18 131/86 -- 98 % -- -- None (Room air) Lying No Pain              Pertinent Labs/Diagnostic Test Results:   Radiology:  XR chest 1 view portable   Final Interpretation by Morgan Fofana MD (08/26 4620)      No acute cardiopulmonary disease.            Workstation performed: [id number]           Cardiology:  Echo complete w/ contrast if indicated   Final Result by Afshan Penaloza MD (08/27 0906)        Left Ventricle: Left ventricular cavity size is normal. Wall thickness    is normal. The left ventricular ejection fraction is 70%. Systolic    function is normal. Wall motion is normal. Diastolic function is normal    for age.     Right Ventricle: Right ventricular cavity size is normal. Systolic    function is normal.     Left Atrium: The atrium is normal in size.     Right Atrium: The atrium is normal in size.     Atrial Septum: There is a small and patent foramen ovale confirmed at    rest with predominant left to right shunting using color Doppler.    Calculated Qp/Qs ratio is 1.03.     Mitral Valve: There is mild regurgitation.     Tricuspid Valve: There is mild regurgitation.     Pulmonic Valve: There is mild regurgitation.     Prior TTE study available for comparison. Prior study date: 2/5/2016.    Changes noted when compared to prior study. Changes include: PFO is new. .         Cardiac catheterization   Preliminary Result by Juan J Quiroz DO (08/26 9207)        " Mild nonobstructive epicardial CAD         ECG 12 lead   Final Result by Cesar Melgar MD (08/26 2115)   Age and gender specific ECG analysis    Sinus rhythm with 1st degree A-V block   Prolonged QT   Abnormal ECG   When compared with ECG of 26-AUG-2024 09:29, (unconfirmed)   No significant change was found   Confirmed by Cesar Melgar ([id number]) on 8/26/2024 9:15:45 PM      ECG 12 lead   Final Result by Cesar Melgar MD (08/26 2115)   Normal sinus rhythm   Possible Left atrial enlargement   Borderline ECG   When compared with ECG of 21-JAN-2016 13:33,   T wave amplitude has increased in Inferior leads   T wave inversion now evident in Lateral leads   Confirmed by Cesar Melgar ([id number]) on 8/26/2024 9:15:39 PM        GI:  No orders to display           Results from last 7 days   Lab Units 08/27/24  0454 08/26/24  0954   WBC Thousand/uL 6.72 6.68   HEMOGLOBIN g/dL 10.5* 12.4   HEMATOCRIT % 32.1* 38.5   PLATELETS Thousands/uL 202 284   TOTAL NEUT ABS Thousands/µL  --  4.35         Results from last 7 days   Lab Units 08/27/24  0454 08/26/24  0954   SODIUM mmol/L 141 139   POTASSIUM mmol/L 3.8 3.7   CHLORIDE mmol/L 108 104   CO2 mmol/L 26 29   ANION GAP mmol/L 7 6   BUN mg/dL 18 16   CREATININE mg/dL 0.84 0.91   EGFR ml/min/1.73sq m 66 60   CALCIUM mg/dL 8.4 9.2     Results from last 7 days   Lab Units 08/26/24  0954   AST U/L 16   ALT U/L 13   ALK PHOS U/L 92   TOTAL PROTEIN g/dL 6.8   ALBUMIN g/dL 3.8   TOTAL BILIRUBIN mg/dL 0.48         Results from last 7 days   Lab Units 08/27/24  0454 08/26/24  0954   GLUCOSE RANDOM mg/dL 90 91             No results found for: \"BETA-HYDROXYBUTYRATE\"                   Results from last 7 days   Lab Units 08/26/24  0954   HS TNI 0HR ng/L 14     Results from last 7 days   Lab Units 08/26/24  1712   D-DIMER QUANTITATIVE ug/ml FEU 0.66*         Results from last 7 days   Lab Units 08/26/24  0954   TSH 3RD GENERATON uIU/mL 4.871*           ED Treatment-Medication " Administration from 08/26/2024 0922 to 08/26/2024 1054         Date/Time Order Dose Route Action     08/26/2024 1034 aspirin chewable tablet 324 mg 324 mg Oral Given            Past Medical History:   Diagnosis Date    Allergic     pennicillin and Dexamethazone    Basal cell carcinoma 1991    Cancer (HCC) 2001    Basal cell on right cheek    Diverticulitis of colon     Hypertension     Osteoporosis     Rosacea     Skin tag     Varicella      Present on Admission:   Hypertension   Subclinical hypothyroidism   Urge incontinence of urine      Admitting Diagnosis: Chest pain [R07.9]  Age/Sex: 79 y.o. female  Admission Orders:  SCD  Continuous ST segment monitoring  Tele      Scheduled Medications:  aspirin, 81 mg, Oral, Daily  atorvastatin, 40 mg, Oral, After Dinner  heparin (porcine), 5,000 Units, Subcutaneous, Q8H  losartan, 100 mg, Oral, Daily   And  hydroCHLOROthiazide, 25 mg, Oral, Daily  oxybutynin, 10 mg, Oral, Daily  polyethylene glycol, 17 g, Oral, Daily      Continuous IV Infusions:  sodium chloride 0.9 % infusion  Rate: 125 mL/hr Dose: 125 mL/hr  Freq: Continuous Route: IV  Indications of Use: IV Hydration  Last Dose: Stopped (08/26/24 1352)  Start: 08/26/24 1030 End: 08/27/24 0026         PRN Meds:  acetaminophen, 650 mg, Oral, Q4H PRN  ondansetron, 4 mg, Intravenous, Q6H PRN  senna, 1 tablet, Oral, HS PRN        None    Network Utilization Review Department  ATTENTION: Please call with any questions or concerns to [phone] and carefully listen to the prompts so that you are directed to the right person. All voicemails are confidential.   For Discharge needs, contact Care Management DC Support Team at [phone] opt. 2  Send all requests for admission clinical reviews, approved or denied determinations and any other requests to dedicated fax number below belonging to the campus where the patient is receiving treatment. List of dedicated fax numbers for the Facilities:  FACILITY NAME UR FAX NUMBER    ADMISSION DENIALS (Administrative/Medical Necessity) [phone]   DISCHARGE SUPPORT TEAM (NETWORK) [phone]   PARENT CHILD HEALTH (Maternity/NICU/Pediatrics) [phone]   [facility] [phone]   [facility] [phone]   [facility] [phone]   [facility] [phone]   [facility] [phone]   [facility] [phone]   [facility] [phone]   [facility] [phone]   [facility] [phone]   [facility] [phone]   [facility] [phone]   [facility] [phone]

## 2024-08-28 ENCOUNTER — APPOINTMENT (INPATIENT)
Dept: NON INVASIVE DIAGNOSTICS | Facility: HOSPITAL | Age: 79
DRG: 287 | End: 2024-08-28
Attending: INTERNAL MEDICINE
Payer: COMMERCIAL

## 2024-08-28 VITALS
HEART RATE: 64 BPM | DIASTOLIC BLOOD PRESSURE: 48 MMHG | SYSTOLIC BLOOD PRESSURE: 127 MMHG | HEIGHT: 61 IN | BODY MASS INDEX: 28.13 KG/M2 | WEIGHT: 149 LBS | RESPIRATION RATE: 18 BRPM | TEMPERATURE: 98.3 F | OXYGEN SATURATION: 95 %

## 2024-08-28 LAB
CHEST PAIN STATEMENT: NORMAL
CHEST PAIN STATEMENT: NORMAL
MAX DIASTOLIC BP: 68 MMHG
MAX DIASTOLIC BP: 68 MMHG
MAX HR PERCENT: 84 %
MAX HR: 120 BPM
MAX PREDICTED HEART RATE: 142 BPM
MAX PREDICTED HEART RATE: 142 BPM
PROTOCOL NAME: NORMAL
PROTOCOL NAME: NORMAL
RATE PRESSURE PRODUCT: NORMAL
REASON FOR TERMINATION: NORMAL
REASON FOR TERMINATION: NORMAL
SL CV STRESS RECOVERY BP: NORMAL MMHG
SL CV STRESS RECOVERY HR: 71 BPM
SL CV STRESS RECOVERY O2 SAT: 98 %
SL CV STRESS STAGE REACHED: 2
STRESS ANGINA INDEX: 0
STRESS BASELINE BP: NORMAL MMHG
STRESS BASELINE HR: 62 BPM
STRESS O2 SAT REST: 97 %
STRESS PEAK HR: 120 BPM
STRESS POST ESTIMATED WORKLOAD: 6.4 METS
STRESS POST EXERCISE DUR MIN: 10 MIN
STRESS POST EXERCISE DUR SEC: 30 SEC
STRESS POST O2 SAT PEAK: 98 %
STRESS POST PEAK BP: 148 MMHG
STRESS POST PEAK HR: 120 BPM
STRESS POST PEAK HR: 120 BPM
STRESS POST PEAK SYSTOLIC BP: 170 MMHG
STRESS POST PEAK SYSTOLIC BP: 170 MMHG
TARGET HR FORMULA: NORMAL
TARGET HR FORMULA: NORMAL
TEST INDICATION: NORMAL
TEST INDICATION: NORMAL

## 2024-08-28 PROCEDURE — 99239 HOSP IP/OBS DSCHRG MGMT >30: CPT | Performed by: INTERNAL MEDICINE

## 2024-08-28 PROCEDURE — 99232 SBSQ HOSP IP/OBS MODERATE 35: CPT | Performed by: INTERNAL MEDICINE

## 2024-08-28 PROCEDURE — 93016 CV STRESS TEST SUPVJ ONLY: CPT | Performed by: INTERNAL MEDICINE

## 2024-08-28 PROCEDURE — 93017 CV STRESS TEST TRACING ONLY: CPT

## 2024-08-28 PROCEDURE — 93018 CV STRESS TEST I&R ONLY: CPT | Performed by: INTERNAL MEDICINE

## 2024-08-28 RX ORDER — ATORVASTATIN CALCIUM 40 MG/1
40 TABLET, FILM COATED ORAL
Qty: 30 TABLET | Refills: 0 | Status: SHIPPED | OUTPATIENT
Start: 2024-08-28 | End: 2024-08-29

## 2024-08-28 RX ORDER — METOPROLOL SUCCINATE 25 MG/1
25 TABLET, EXTENDED RELEASE ORAL DAILY
Qty: 30 TABLET | Refills: 0 | Status: SHIPPED | OUTPATIENT
Start: 2024-08-29 | End: 2024-08-29

## 2024-08-28 RX ADMIN — ASPIRIN 81 MG CHEWABLE TABLET 81 MG: 81 TABLET CHEWABLE at 08:35

## 2024-08-28 RX ADMIN — LOSARTAN POTASSIUM 100 MG: 50 TABLET, FILM COATED ORAL at 08:35

## 2024-08-28 RX ADMIN — OXYBUTYNIN 10 MG: 10 TABLET, FILM COATED, EXTENDED RELEASE ORAL at 08:35

## 2024-08-28 RX ADMIN — METOPROLOL SUCCINATE 25 MG: 25 TABLET, EXTENDED RELEASE ORAL at 08:35

## 2024-08-28 RX ADMIN — HYDROCHLOROTHIAZIDE 25 MG: 25 TABLET ORAL at 08:35

## 2024-08-28 NOTE — ASSESSMENT & PLAN NOTE
Patient presented with chest pain during exertion and associated nausea and pain/numbness on her bilateral arms and neck  Patient had a suspected viral illness approximately 3 weeks ago  S/p cardiac catheterization which showed mild nonobstructive coronary disease  Continue aspirin, Lipitor  2D echo reveals EF 70%  Cardiac stress test negative for ischemia after submaximal exercise without reproduction of symptoms of chest pain  Discussed with cardiology patient is cleared for discharge with metoprolol aspirin and Lipitor  Outpatient follow-up

## 2024-08-28 NOTE — PROGRESS NOTES
[facility]  Progress Note  Name: Luis Kelly I  MRN: [id number]  Unit/Bed#: PPHP-326-01 I Date of Admission: 8/26/2024   Date of Service: 8/28/2024 I Hospital Day: 2    Assessment & Plan   * Chest discomfort  Assessment & Plan  Patient presented with chest pain during exertion and associated nausea and pain/numbness on her bilateral arms and neck  Patient had a suspected viral illness approximately 3 weeks ago  S/p cardiac catheterization which showed mild nonobstructive coronary disease  Continue aspirin, Lipitor  2D echo reveals EF 70%  Cardiac stress test negative for ischemia after submaximal exercise without reproduction of symptoms of chest pain  Discussed with cardiology patient is cleared for discharge with metoprolol aspirin and Lipitor  Outpatient follow-up    Subclinical hypothyroidism  Assessment & Plan   Latest Reference Range & Units 08/26/24 09:54   TSH 3RD GENERATON 0.450 - 4.500 uIU/mL 4.871 (H)   FREE T4 0.61 - 1.12 ng/dL 0.89   Not on medications at home  Check T4 TSH in 4 to 6 weeks and follow-up with primary care physician    Hypertension  Assessment & Plan  Continue ARB/HCTZ  Ambulatory blood pressure monitoring and follow-up with primary care physician recommended               Discharge Summary - [facility] Internal Medicine    Patient Information: Luis Kelly 79 y.o. female MRN: [id number]  Unit/Bed#: PPHP-326-01 Encounter: [id number]    Discharging Physician / Practitioner: Flavia Lester MD  PCP: Shannon Reis DO  Admission Date: 8/26/2024  Discharge Date: 08/28/24    Disposition:     Home    Reason for Admission: Chest pain    Discharge Diagnoses:     Principal Problem:    Chest discomfort  Active Problems:    Hypertension    Subclinical hypothyroidism    Urge incontinence of urine  Resolved Problems:    * No resolved hospital problems. *      Consultations During Hospital Stay:  Cardiology    Procedures Performed:     Chest x-ray  "no active lung disease  CT chest PE study no evidence of acute pulm embolism or thoracic aortic aneurysm.  No acute cardiopulmonary process  Cardiac catheterization mild nonobstructive epicardial coronary artery disease.  Normal LVEF  2D echo EF 70%.  Systolic function normal.  Diastolic function normal for age.  Cardiac stress test stress ECG negative for ischemia after submaximal exercise, without reproduction of symptoms of chest pain.      Hospital Course:     Luis Kelly is a 79 y.o. female patient who originally presented to the hospital on 8/26/2024 due to chest pain, this was described as exertional chest pain.  She underwent cardiac workup including cardiac catheterization 2D echo and cardiac stress test with results as outlined above.  CTA chest did not reveal PE.  Seen in consultation with cardiology she has been placed on aspirin beta-blocker atorvastatin.  Her symptoms are now improved.  She will resume her home medications as prior.  Her chronic conditions remained stable at discharge.  She is hemodynamically stable symptomatically improving and is deemed ready for discharge today.  Kindly review the chart for details.    Condition at Discharge: fair     Discharge Day Visit / Exam:     Subjective:      Comfortably sitting up in bed  Reports feeling okay  Denies chest pain shortness of breath  Agreeable to discharge plan        Vitals: Blood Pressure: (!) 127/48 (08/28/24 1512)  Pulse: 64 (08/28/24 1512)  Temperature: 98.3 °F (36.8 °C) (08/28/24 1512)  Temp Source: Oral (08/28/24 0234)  Respirations: 18 (08/28/24 1512)  Height: 5' 1\" (154.9 cm) (08/27/24 0743)  Weight - Scale: 67.6 kg (149 lb) (08/27/24 0743)  SpO2: 95 % (08/28/24 1512)  Exam:   Physical Exam    Comfortably sitting up in bed  Neck supple  Lungs vesicular breath sounds  Heart sounds S1-S2 noted  No murmurs appreciable  Abdomen soft nontender  Awake follows commands  No pedal edema  No rash      Discharge instructions/Information to " patient and family:   See after visit summary for information provided to patient and family.      Discharge plan discussed with cardiology  Discharge plan discussed with the patient, updated spouse and daughter in detail questions answered  Outpatient follow-up with cardiology, primary care physician    Provisions for Follow-Up Care:  See after visit summary for information related to follow-up care and any pertinent home health orders.      Planned Readmission: no     Discharge Statement:  I spent 42 minutes discharging the patient. This time was spent on the day of discharge. I had direct contact with the patient on the day of discharge. Greater than 50% of the total time was spent examining patient, answering all patient questions, arranging and discussing plan of care with patient as well as directly providing post-discharge instructions.  Additional time then spent on discharge activities.    Discharge Medications:  See after visit summary for reconciled discharge medications provided to patient and family.      ** Please Note: This note has been constructed using a voice recognition system **

## 2024-08-28 NOTE — PLAN OF CARE
Problem: PAIN - ADULT  Goal: Verbalizes/displays adequate comfort level or baseline comfort level  Description: Interventions:  - Encourage patient to monitor pain and request assistance  - Assess pain using appropriate pain scale  - Administer analgesics based on type and severity of pain and evaluate response  - Implement non-pharmacological measures as appropriate and evaluate response  - Consider cultural and social influences on pain and pain management  - Notify physician/advanced practitioner if interventions unsuccessful or patient reports new pain  Outcome: Progressing     Problem: INFECTION - ADULT  Goal: Absence or prevention of progression during hospitalization  Description: INTERVENTIONS:  - Assess and monitor for signs and symptoms of infection  - Monitor lab/diagnostic results  - Monitor all insertion sites, i.e. indwelling lines, tubes, and drains  - Monitor endotracheal if appropriate and nasal secretions for changes in amount and color  - [place] appropriate cooling/warming therapies per order  - Administer medications as ordered  - Instruct and encourage patient and family to use good hand hygiene technique  - Identify and instruct in appropriate isolation precautions for identified infection/condition  Outcome: Progressing  Goal: Absence of fever/infection during neutropenic period  Description: INTERVENTIONS:  - Monitor WBC    Outcome: Progressing     Problem: SAFETY ADULT  Goal: Patient will remain free of falls  Description: INTERVENTIONS:  - Educate patient/family on patient safety including physical limitations  - Instruct patient to call for assistance with activity   - Consult OT/PT to assist with strengthening/mobility   - Keep Call bell within reach  - Keep bed low and locked with side rails adjusted as appropriate  - Keep care items and personal belongings within reach  - Initiate and maintain comfort rounds  - Make Fall Risk Sign visible to staff  - Offer Toileting every 4 Hours,  in advance of need  - Initiate/Maintain 4alarm  - Obtain necessary fall risk management equipment: 4  - Apply yellow socks and bracelet for high fall risk patients  - Consider moving patient to room near nurses station  Outcome: Progressing  Goal: Maintain or return to baseline ADL function  Description: INTERVENTIONS:  -  Assess patient's ability to carry out ADLs; assess patient's baseline for ADL function and identify physical deficits which impact ability to perform ADLs (bathing, care of mouth/teeth, toileting, grooming, dressing, etc.)  - Assess/evaluate cause of self-care deficits   - Assess range of motion  - Assess patient's mobility; develop plan if impaired  - Assess patient's need for assistive devices and provide as appropriate  - Encourage maximum independence but intervene and supervise when necessary  - Involve family in performance of ADLs  - Assess for home care needs following discharge   - Consider OT consult to assist with ADL evaluation and planning for discharge  - Provide patient education as appropriate  Outcome: Progressing  Goal: Maintains/Returns to pre admission functional level  Description: INTERVENTIONS:  - Perform AM-PAC 6 Click Basic Mobility/ Daily Activity assessment daily.  - Set and communicate daily mobility goal to care team and patient/family/caregiver.   - Collaborate with rehabilitation services on mobility goals if consulted  - Perform Range of Motion 4 times a day.  - Reposition patient every 4 hours.  - Dangle patient 4 times a day  - Stand patient 4 times a day  - Ambulate patient 4 times a day  - Out of bed to chair 4 times a day   - Out of bed for meals 4 times a day  - Out of bed for toileting  - Record patient progress and toleration of activity level   Outcome: Progressing     Problem: DISCHARGE PLANNING  Goal: Discharge to home or other facility with appropriate resources  Description: INTERVENTIONS:  - Identify barriers to discharge w/patient and caregiver  -  Arrange for needed discharge resources and transportation as appropriate  - Identify discharge learning needs (meds, wound care, etc.)  - Arrange for interpretive services to assist at discharge as needed  - Refer to Case Management Department for coordinating discharge planning if the patient needs post-hospital services based on physician/advanced practitioner order or complex needs related to functional status, cognitive ability, or social support system  Outcome: Progressing     Problem: Knowledge Deficit  Goal: Patient/family/caregiver demonstrates understanding of disease process, treatment plan, medications, and discharge instructions  Description: Complete learning assessment and assess knowledge base.  Interventions:  - Provide teaching at level of understanding  - Provide teaching via preferred learning methods  Outcome: Progressing     Problem: NEUROSENSORY - ADULT  Goal: Achieves stable or improved neurological status  Description: INTERVENTIONS  - Monitor and report changes in neurological status  - Monitor vital signs such as temperature, blood pressure, glucose, and any other labs ordered   - Initiate measures to prevent increased intracranial pressure  - Monitor for seizure activity and implement precautions if appropriate      Outcome: Progressing  Goal: Remains free of injury related to seizures activity  Description: INTERVENTIONS  - Maintain airway, patient safety  and administer oxygen as ordered  - Monitor patient for seizure activity, document and report duration and description of seizure to physician/advanced practitioner  - If seizure occurs,  ensure patient safety during seizure  - Reorient patient post seizure  - Seizure pads on all 4 side rails  - Instruct patient/family to notify RN of any seizure activity including if an aura is experienced  - Instruct patient/family to call for assistance with activity based on nursing assessment  - Administer anti-seizure medications if  ordered    Outcome: Progressing  Goal: Achieves maximal functionality and self care  Description: INTERVENTIONS  - Monitor swallowing and airway patency with patient fatigue and changes in neurological status  - Encourage and assist patient to increase activity and self care.   - Encourage visually impaired, hearing impaired and aphasic patients to use assistive/communication devices  Outcome: Progressing     Problem: CARDIOVASCULAR - ADULT  Goal: Maintains optimal cardiac output and hemodynamic stability  Description: INTERVENTIONS:  - Monitor I/O, vital signs and rhythm  - Monitor for S/S and trends of decreased cardiac output  - Administer and titrate ordered vasoactive medications to optimize hemodynamic stability  - Assess quality of pulses, skin color and temperature  - Assess for signs of decreased coronary artery perfusion  - Instruct patient to report change in severity of symptoms  Outcome: Progressing  Goal: Absence of cardiac dysrhythmias or at baseline rhythm  Description: INTERVENTIONS:  - Continuous cardiac monitoring, vital signs, obtain 12 lead EKG if ordered  - Administer antiarrhythmic and heart rate control medications as ordered  - Monitor electrolytes and administer replacement therapy as ordered  Outcome: Progressing     Problem: RESPIRATORY - ADULT  Goal: Achieves optimal ventilation and oxygenation  Description: INTERVENTIONS:  - Assess for changes in respiratory status  - Assess for changes in mentation and behavior  - Position to facilitate oxygenation and minimize respiratory effort  - Oxygen administered by appropriate delivery if ordered  - Initiate smoking cessation education as indicated  - Encourage broncho-pulmonary hygiene including cough, deep breathe, Incentive Spirometry  - Assess the need for suctioning and aspirate as needed  - Assess and instruct to report SOB or any respiratory difficulty  - Respiratory Therapy support as indicated  Outcome: Progressing     Problem:  GASTROINTESTINAL - ADULT  Goal: Minimal or absence of nausea and/or vomiting  Description: INTERVENTIONS:  - Administer IV fluids if ordered to ensure adequate hydration  - Maintain NPO status until nausea and vomiting are resolved  - Nasogastric tube if ordered  - Administer ordered antiemetic medications as needed  - Provide nonpharmacologic comfort measures as appropriate  - Advance diet as tolerated, if ordered  - Consider nutrition services referral to assist patient with adequate nutrition and appropriate food choices  Outcome: Progressing  Goal: Maintains or returns to baseline bowel function  Description: INTERVENTIONS:  - Assess bowel function  - Encourage oral fluids to ensure adequate hydration  - Administer IV fluids if ordered to ensure adequate hydration  - Administer ordered medications as needed  - Encourage mobilization and activity  - Consider nutritional services referral to assist patient with adequate nutrition and appropriate food choices  Outcome: Progressing  Goal: Maintains adequate nutritional intake  Description: INTERVENTIONS:  - Monitor percentage of each meal consumed  - Identify factors contributing to decreased intake, treat as appropriate  - Assist with meals as needed  - Monitor I&O, weight, and lab values if indicated  - Obtain nutrition services referral as needed  Outcome: Progressing  Goal: Establish and maintain optimal ostomy function  Description: INTERVENTIONS:  - Assess bowel function  - Encourage oral fluids to ensure adequate hydration  - Administer IV fluids if ordered to ensure adequate hydration   - Administer ordered medications as needed  - Encourage mobilization and activity  - Nutrition services referral to assist patient with appropriate food choices  - Assess stoma site  - Consider wound care consult   Outcome: Progressing  Goal: Oral mucous membranes remain intact  Description: INTERVENTIONS  - Assess oral mucosa and hygiene practices  - Implement preventative  oral hygiene regimen  - Implement oral medicated treatments as ordered  - Initiate Nutrition services referral as needed  Outcome: Progressing     Problem: GENITOURINARY - ADULT  Goal: Maintains or returns to baseline urinary function  Description: INTERVENTIONS:  - Assess urinary function  - Encourage oral fluids to ensure adequate hydration if ordered  - Administer IV fluids as ordered to ensure adequate hydration  - Administer ordered medications as needed  - Offer frequent toileting  - Follow urinary retention protocol if ordered  Outcome: Progressing  Goal: Absence of urinary retention  Description: INTERVENTIONS:  - Assess patient’s ability to void and empty bladder  - Monitor I/O  - Bladder scan as needed  - Discuss with physician/AP medications to alleviate retention as needed  - Discuss catheterization for long term situations as appropriate  Outcome: Progressing  Goal: Urinary catheter remains patent  Description: INTERVENTIONS:  - Assess patency of urinary catheter  - If patient has a chronic wills, consider changing catheter if non-functioning  - Follow guidelines for intermittent irrigation of non-functioning urinary catheter  Outcome: Progressing     Problem: METABOLIC, FLUID AND ELECTROLYTES - ADULT  Goal: Electrolytes maintained within normal limits  Description: INTERVENTIONS:  - Monitor labs and assess patient for signs and symptoms of electrolyte imbalances  - Administer electrolyte replacement as ordered  - Monitor response to electrolyte replacements, including repeat lab results as appropriate  - Instruct patient on fluid and nutrition as appropriate  Outcome: Progressing  Goal: Fluid balance maintained  Description: INTERVENTIONS:  - Monitor labs   - Monitor I/O and WT  - Instruct patient on fluid and nutrition as appropriate  - Assess for signs & symptoms of volume excess or deficit  Outcome: Progressing  Goal: Glucose maintained within target range  Description: INTERVENTIONS:  - Monitor  Blood Glucose as ordered  - Assess for signs and symptoms of hyperglycemia and hypoglycemia  - Administer ordered medications to maintain glucose within target range  - Assess nutritional intake and initiate nutrition service referral as needed  Outcome: Progressing     Problem: SKIN/TISSUE INTEGRITY - ADULT  Goal: Skin Integrity remains intact(Skin Breakdown Prevention)  Description: Assess:  -Perform Lucio assessment every 4  -Clean and moisturize skin every 4  -Inspect skin when repositioning, toileting, and assisting with ADLS  -Assess under medical devices such as 4 every 4  -Assess extremities for adequate circulation and sensation     Bed Management:  -Have minimal linens on bed & keep smooth, unwrinkled  -Change linens as needed when moist or perspiring  -Avoid sitting or lying in one position for more than 4 hours while in bed  -Keep HOB at 4degrees     Toileting:  -Offer bedside commode  -Assess for incontinence every 4  -Use incontinent care products after each incontinent episode such as 4    Activity:  -Mobilize patient 4 times a day  -Encourage activity and walks on unit  -Encourage or provide ROM exercises   -Turn and reposition patient every 4 Hours  -Use appropriate equipment to lift or move patient in bed  -Instruct/ Assist with weight shifting every 4 when out of bed in chair  -Consider limitation of chair time 4 hour intervals    Skin Care:  -Avoid use of baby powder, tape, friction and shearing, hot water or constrictive clothing  -Relieve pressure over bony prominences using 4  -Do not massage red bony areas    Next Steps:  -Teach patient strategies to minimize risks such as 4   -Consider consults to  interdisciplinary teams such as 4  Outcome: Progressing  Goal: Incision(s), wounds(s) or drain site(s) healing without S/S of infection  Description: INTERVENTIONS  - Assess and document dressing, incision, wound bed, drain sites and surrounding tissue  - Provide patient and family education  -  Perform skin care/dressing changes every 4  Outcome: Progressing  Goal: Pressure injury heals and does not worsen  Description: Interventions:  - Implement low air loss mattress or specialty surface (Criteria met)  - Apply silicone foam dressing  - Instruct/assist with weight shifting every 4 minutes when in chair   - Limit chair time to 4 hour intervals  - Use special pressure reducing interventions such as 4 when in chair   - Apply fecal or urinary incontinence containment device   - Perform passive or active ROM every 4  - Turn and reposition patient & offload bony prominences every 4 hours   - Utilize friction reducing device or surface for transfers   - Consider consults to  interdisciplinary teams such as 4  - Use incontinent care products after each incontinent episode such as 4  - Consider nutrition services referral as needed  Outcome: Progressing     Problem: HEMATOLOGIC - ADULT  Goal: Maintains hematologic stability  Description: INTERVENTIONS  - Assess for signs and symptoms of bleeding or hemorrhage  - Monitor labs  - Administer supportive blood products/factors as ordered and appropriate  Outcome: Progressing     Problem: MUSCULOSKELETAL - ADULT  Goal: Maintain or return mobility to safest level of function  Description: INTERVENTIONS:  - Assess patient's ability to carry out ADLs; assess patient's baseline for ADL function and identify physical deficits which impact ability to perform ADLs (bathing, care of mouth/teeth, toileting, grooming, dressing, etc.)  - Assess/evaluate cause of self-care deficits   - Assess range of motion  - Assess patient's mobility  - Assess patient's need for assistive devices and provide as appropriate  - Encourage maximum independence but intervene and supervise when necessary  - Involve family in performance of ADLs  - Assess for home care needs following discharge   - Consider OT consult to assist with ADL evaluation and planning for discharge  - Provide patient  education as appropriate  Outcome: Progressing  Goal: Maintain proper alignment of affected body part  Description: INTERVENTIONS:  - Support, maintain and protect limb and body alignment  - Provide patient/ family with appropriate education  Outcome: Progressing     Problem: Nutrition/Hydration-ADULT  Goal: Nutrient/Hydration intake appropriate for improving, restoring or maintaining nutritional needs  Description: Monitor and assess patient's nutrition/hydration status for malnutrition. Collaborate with interdisciplinary team and initiate plan and interventions as ordered.  Monitor patient's weight and dietary intake as ordered or per policy. Utilize nutrition screening tool and intervene as necessary. Determine patient's food preferences and provide high-protein, high-caloric foods as appropriate.     INTERVENTIONS:  - Monitor oral intake, urinary output, labs, and treatment plans  - Assess nutrition and hydration status and recommend course of action  - Evaluate amount of meals eaten  - Assist patient with eating if necessary   - Allow adequate time for meals  - Recommend/ encourage appropriate diets, oral nutritional supplements, and vitamin/mineral supplements  - Order, calculate, and assess calorie counts as needed  - Recommend, monitor, and adjust tube feedings and TPN/PPN based on assessed needs  - Assess need for intravenous fluids  - Provide specific nutrition/hydration education as appropriate  - Include patient/family/caregiver in decisions related to nutrition  Outcome: Progressing

## 2024-08-28 NOTE — DISCHARGE SUMMARY
[facility]  Discharge summary  Name: Luis Kelly I  MRN: [id number]  Unit/Bed#: PPHP-326-01 I Date of Admission: 8/26/2024   Date of Service: 8/28/2024 I Hospital Day: 2        Assessment & Plan  * Chest discomfort  Assessment & Plan  Patient presented with chest pain during exertion and associated nausea and pain/numbness on her bilateral arms and neck  Patient had a suspected viral illness approximately 3 weeks ago  S/p cardiac catheterization which showed mild nonobstructive coronary disease  Continue aspirin, Lipitor  2D echo reveals EF 70%  Cardiac stress test negative for ischemia after submaximal exercise without reproduction of symptoms of chest pain  Discussed with cardiology patient is cleared for discharge with metoprolol aspirin and Lipitor  Outpatient follow-up     Subclinical hypothyroidism  Assessment & Plan    Latest Reference Range & Units 08/26/24 09:54   TSH 3RD GENERATON 0.450 - 4.500 uIU/mL 4.871 (H)   FREE T4 0.61 - 1.12 ng/dL 0.89   Not on medications at home  Check T4 TSH in 4 to 6 weeks and follow-up with primary care physician     Hypertension  Assessment & Plan  Continue ARB/HCTZ  Ambulatory blood pressure monitoring and follow-up with primary care physician recommended                       Discharge Summary - [facility] Internal Medicine     Patient Information: Luis Kelly 79 y.o. female MRN: [id number]  Unit/Bed#: PPHP-326-01 Encounter: [id number]     Discharging Physician / Practitioner: Flavia Lester MD  PCP: Shannon Reis DO  Admission Date: 8/26/2024  Discharge Date: 08/28/24     Disposition:      Home     Reason for Admission: Chest pain     Discharge Diagnoses:      Principal Problem:    Chest discomfort  Active Problems:    Hypertension    Subclinical hypothyroidism    Urge incontinence of urine  Resolved Problems:    * No resolved hospital problems. *        Consultations During Hospital Stay:  Cardiology    "  Procedures Performed:      Chest x-ray no active lung disease  CT chest PE study no evidence of acute pulm embolism or thoracic aortic aneurysm.  No acute cardiopulmonary process  Cardiac catheterization mild nonobstructive epicardial coronary artery disease.  Normal LVEF  2D echo EF 70%.  Systolic function normal.  Diastolic function normal for age.  Cardiac stress test stress ECG negative for ischemia after submaximal exercise, without reproduction of symptoms of chest pain.        Hospital Course:      Luis Kelly is a 79 y.o. female patient who originally presented to the hospital on 8/26/2024 due to chest pain, this was described as exertional chest pain.  She underwent cardiac workup including cardiac catheterization 2D echo and cardiac stress test with results as outlined above.  CTA chest did not reveal PE.  Seen in consultation with cardiology she has been placed on aspirin beta-blocker atorvastatin.  Her symptoms are now improved.  She will resume her home medications as prior.  Her chronic conditions remained stable at discharge.  She is hemodynamically stable symptomatically improving and is deemed ready for discharge today.  Kindly review the chart for details.     Condition at Discharge: fair      Discharge Day Visit / Exam:      Subjective:       Comfortably sitting up in bed  Reports feeling okay  Denies chest pain shortness of breath  Agreeable to discharge plan           Vitals: Blood Pressure: (!) 127/48 (08/28/24 1512)  Pulse: 64 (08/28/24 1512)  Temperature: 98.3 °F (36.8 °C) (08/28/24 1512)  Temp Source: Oral (08/28/24 0234)  Respirations: 18 (08/28/24 1512)  Height: 5' 1\" (154.9 cm) (08/27/24 0743)  Weight - Scale: 67.6 kg (149 lb) (08/27/24 0743)  SpO2: 95 % (08/28/24 1512)  Exam:   Physical Exam     Comfortably sitting up in bed  Neck supple  Lungs vesicular breath sounds  Heart sounds S1-S2 noted  No murmurs appreciable  Abdomen soft nontender  Awake follows commands  No pedal " edema  No rash        Discharge instructions/Information to patient and family:   See after visit summary for information provided to patient and family.       Discharge plan discussed with cardiology  Discharge plan discussed with the patient, updated spouse and daughter in detail questions answered  Outpatient follow-up with cardiology, primary care physician     Provisions for Follow-Up Care:  See after visit summary for information related to follow-up care and any pertinent home health orders.       Planned Readmission: no     Discharge Statement:  I spent 42 minutes discharging the patient. This time was spent on the day of discharge. I had direct contact with the patient on the day of discharge. Greater than 50% of the total time was spent examining patient, answering all patient questions, arranging and discussing plan of care with patient as well as directly providing post-discharge instructions.  Additional time then spent on discharge activities.     Discharge Medications:  See after visit summary for reconciled discharge medications provided to patient and family.       ** Please Note: This note has been constructed using a voice recognition system **

## 2024-08-28 NOTE — PROGRESS NOTES
Cardiology Progress Note.   Unit/Bed#: PPHP-326-01 Encounter: [id number]        Luis Kelly 79 y.o. female [id number]  Hospital Stay Days: 2    Assessment and Plan      Current Problem List   Principal Problem:    Chest discomfort  Active Problems:    Hypertension    Subclinical hypothyroidism    Urge incontinence of urine    Assessment/Plan:    Luis Kelly is a(n) 79 y.o. female with PMH of HTN, HLD admitted for evaluation of anginal symptoms. Cardiac catheterization revealed mild nonobstructive coronary disease. Echo showed 70% EF, no wall abnormality, PFO.   CTA negative for PE. Stress test negative for ischemia after submaximal exercise, without reproduction of symptoms of chest pain. Unclear etiology of chest pain.     Plan:    Exercise treadmill stress test today to evaluate for myocardial ischemia.   Continue home ASA 81mg daily, Metoprolol 12.5mg BID, Cozaar 100mg daily, Lipitor 40mg daily  Follow up cardiology outpatient     Subjective     The patient was seen and evaluated at bedside. No overnight events. Patient was resting comfortably. She reports no further episodes of chest pain. She has no other complaints at this time.     Objective     Vitals: Temp (24hrs), Av.9 °F (36.6 °C), Min:97.3 °F (36.3 °C), Max:98.2 °F (36.8 °C)  Current: Temperature: 98.1 °F (36.7 °C)  Patient Vitals for the past 24 hrs:   BP Temp Temp src Pulse Resp SpO2   24 0628 131/55 98.1 °F (36.7 °C) -- 71 20 98 %   24 0234 (!) 118/48 (!) 97.3 °F (36.3 °C) Oral 61 17 95 %   24 2344 153/60 -- -- 60 -- 97 %   24 -- -- -- -- -- 98 %   24 1748 149/62 -- -- 71 -- 98 %   24 1542 134/58 98.2 °F (36.8 °C) -- 68 17 96 %   24 1116 146/55 97.9 °F (36.6 °C) -- 69 19 96 %   24 0903 (!) 142/49 -- -- 74 -- 96 %    Body mass index is 28.15 kg/m².  Physical Exam:  Physical Exam  Vitals and nursing note reviewed.   Constitutional:       General: She is not in acute  "distress.     Appearance: She is well-developed.   HENT:      Head: Normocephalic and atraumatic.   Eyes:      Conjunctiva/sclera: Conjunctivae normal.   Cardiovascular:      Rate and Rhythm: Normal rate and regular rhythm.      Heart sounds: No murmur heard.  Pulmonary:      Effort: Pulmonary effort is normal. No respiratory distress.      Breath sounds: Normal breath sounds.   Abdominal:      Palpations: Abdomen is soft.      Tenderness: There is no abdominal tenderness.   Musculoskeletal:         General: No swelling.      Cervical back: Neck supple.   Skin:     General: Skin is warm and dry.      Capillary Refill: Capillary refill takes less than 2 seconds.   Neurological:      Mental Status: She is alert.   Psychiatric:         Mood and Affect: Mood normal.         Invasive Devices       Peripheral Intravenous Line  Duration             Peripheral IV 08/26/24 Right Antecubital 1 day                        Labs:   Results from last 7 days   Lab Units 08/27/24  0454 08/26/24  0954   WBC Thousand/uL 6.72 6.68   HEMOGLOBIN g/dL 10.5* 12.4   HEMATOCRIT % 32.1* 38.5   PLATELETS Thousands/uL 202 284   SEGS PCT %  --  66   MONO PCT %  --  9   EOS PCT %  --  3      Results from last 7 days   Lab Units 08/27/24  0454 08/26/24  0954   SODIUM mmol/L 141 139   POTASSIUM mmol/L 3.8 3.7   CHLORIDE mmol/L 108 104   CO2 mmol/L 26 29   BUN mg/dL 18 16   CREATININE mg/dL 0.84 0.91   CALCIUM mg/dL 8.4 9.2   ALK PHOS U/L  --  92   ALT U/L  --  13   AST U/L  --  16   EGFR ml/min/1.73sq m 66 60                 No results found for: \"PHART\", \"PHH8BGA\", \"PO2ART\", \"ZPX2NHP\", \"V9AGAACP\", \"BEART\", \"SOURCE\"  No components found for: \"HIV1X2\"  Lab Results   Component Value Date    HEPCAB NON-REACTIVE 08/12/2019     No results found for: \"SPEP\", \"UPEP\" No results found for: \"HGBA1C\"  Lab Results   Component Value Date    CHOL 195 09/12/2017    CHOL 201 (H) 07/14/2016    CHOL 210 (H) 01/11/2016      Lab Results   Component Value Date    HDL " "45 (L) 08/26/2024    HDL 56 08/29/2023    HDL 61 08/02/2022      Lab Results   Component Value Date    LDLCALC 112 (H) 08/26/2024    LDLCALC 123 (H) 08/29/2023    LDLCALC 122 (H) 08/02/2022      Lab Results   Component Value Date    TRIG 114 08/26/2024    TRIG 117 08/29/2023    TRIG 92 08/02/2022     No components found for: \"PROCAL\"      Micro:      Urinalysis:  No results found for: \"AMPHETUR\", \"BDZUR\", \"COCAINEUR\", \"OPIATEUR\", \"PCPUR\", \"THCUR\", \"ETOH\", \"ACTMNPHEN\", \"SALICYLATE\"       Invalid input(s): \"URIBILINOGEN\"        Intake and Outputs:  I/O         08/26 0701 08/27 0700 08/27 0701 08/28 0700 08/28 0701 08/29 0700    P.O. 510 522 360    Total Intake(mL/kg) 510 522 (7.7) 360 (5.3)    Urine (mL/kg/hr) 1350 1600 (1)     Stool  0     Total Output 1350 1600     Net -840 -1078 +360           Unmeasured Urine Occurrence 1 x      Unmeasured Stool Occurrence  1 x           Nutrition:  Diet Cardiovascular; Cardiac  Radiology Results:   CTA chest pe study   Final Result by Jamel Zhang MD (08/27 1927)      No evidence of acute pulm embolus or thoracic aortic aneurysm. No acute cardiopulmonary process.                  Workstation performed: [id number]         XR chest 1 view portable   Final Result by Morgan Fofana MD (08/26 0263)      No acute cardiopulmonary disease.            Workstation performed: [id number]           Scheduled Medications:  aspirin, 81 mg, Daily  atorvastatin, 40 mg, After Dinner  heparin (porcine), 5,000 Units, Q8H  losartan, 100 mg, Daily   And  hydroCHLOROthiazide, 25 mg, Daily  metoprolol succinate, 25 mg, Daily  oxybutynin, 10 mg, Daily  polyethylene glycol, 17 g, Daily      PRN MEDS:  acetaminophen, 650 mg, Q4H PRN  ondansetron, 4 mg, Q6H PRN  senna, 1 tablet, HS PRN      Last 24 Hour Meds: :   Medication Administration - last 24 hours from 08/27/2024 0843 to 08/28/2024 0843         Date/Time Order Dose Route Action Action by     08/28/2024 0835 EDT polyethylene glycol (MIRALAX) " packet 17 g 17 g Oral Not Given Bobby Ibarra RN     08/27/2024 0904 EDT polyethylene glycol (MIRALAX) packet 17 g 17 g Oral Not Given Timbo Weber RN     08/27/2024 2007 EDT ondansetron (ZOFRAN) injection 4 mg 4 mg Intravenous Not Given Jasen Yoder RN     08/27/2024 1750 EDT atorvastatin (LIPITOR) tablet 40 mg 40 mg Oral Given Elizabeth Gillette RN     08/28/2024 0835 EDT oxybutynin (DITROPAN-XL) 24 hr tablet 10 mg 10 mg Oral Given Bobby Ibarra RN     08/27/2024 0904 EDT oxybutynin (DITROPAN-XL) 24 hr tablet 10 mg 10 mg Oral Given Timbo Weber RN     08/28/2024 0835 EDT aspirin chewable tablet 81 mg 81 mg Oral Given Bobby Ibarra RN     08/27/2024 0904 EDT aspirin chewable tablet 81 mg 81 mg Oral Given Timbo Weber RN     08/28/2024 0835 EDT losartan (COZAAR) tablet 100 mg 100 mg Oral Given Bobby Ibarra RN     08/27/2024 0904 EDT losartan (COZAAR) tablet 100 mg 100 mg Oral Given Timbo Weber RN     08/28/2024 0835 EDT hydroCHLOROthiazide tablet 25 mg 25 mg Oral Given Bobby Ibarra RN     08/27/2024 0904 EDT hydroCHLOROthiazide tablet 25 mg 25 mg Oral Given Timbo Weber RN     08/28/2024 0528 EDT heparin (porcine) subcutaneous injection 5,000 Units 5,000 Units Subcutaneous Not Given Jasen Yoder RN     08/27/2024 2111 EDT heparin (porcine) subcutaneous injection 5,000 Units 5,000 Units Subcutaneous Given Jasen Yoder RN     08/27/2024 1505 EDT heparin (porcine) subcutaneous injection 5,000 Units 5,000 Units Subcutaneous Refused Slick Garcia RN     08/27/2024 1750 EDT metoprolol succinate (TOPROL-XL) 24 hr tablet 12.5 mg 12.5 mg Oral Given Elizabeth Gillette RN     08/27/2024 1808 EDT iohexol (OMNIPAQUE) 350 MG/ML injection (MULTI-DOSE) 75 mL 75 mL Intravenous Given Sofie Goel     08/28/2024 0835 EDT metoprolol succinate (TOPROL-XL) 24 hr tablet 25 mg 25 mg Oral Given ИВАН Patten MD  [facility]  Internal Medicine Residency PGY-1

## 2024-08-28 NOTE — UTILIZATION REVIEW
"NOTIFICATION OF INPATIENT ADMISSION   AUTHORIZATION REQUEST   SERVICING FACILITY:   [facility]  Address: [address] [place] [address]  Tax ID: [id number]  NPI: [id number] ATTENDING PROVIDER:  Attending Name and NPI#: Flavia Lester Md [[id number]]  Address: [address] [place] [address]  Phone: [phone]   ADMISSION INFORMATION:  Place of Service: Inpatient [facility]  Place of Service Code: 21  Inpatient Admission Date/Time: 8/26/24 10:28 AM  Discharge Date/Time: No discharge date for patient encounter.  Admitting Diagnosis Code/Description:  Chest pain [R07.9]     UTILIZATION REVIEW CONTACT:  Karma Cunha"Adele\"Moustapha Utilization   Network Utilization Review Department  Phone: [phone]  Fax: [phone]  Email: [email or web address]  Contact for approvals/pending authorizations, clinical reviews, and discharge.     PHYSICIAN ADVISORY SERVICES:  Medical Necessity Denial & Xbxy-kr-Zlte Review  Phone: [phone]  Fax: [phone]  Email: [email or web address]     DISCHARGE SUPPORT TEAM:  For Patients Discharge Needs & Updates  Phone: [phone] opt. [phone] Fax: [phone]  Email: [email or web address]     "

## 2024-08-28 NOTE — UTILIZATION REVIEW
Continued Stay Review    SEE INITIAL REVIEW AT BOTTOM    Date: 8/28                          Current Patient Class: Inpatient  Current Level of Care: med/surg    HPI:79 y.o. female initially admitted on  8/26 with anginal symptoms.  Cardiac cath revealed mild nonobstructive coronary disease. Echo showed 70% EF, no wall abnormality, PFO.   CTA negative for PE. Stress test negative for ischemia after submaximal exercise, without reproduction of symptoms of chest pain. Unclear etiology of chest pain.     Assessment/Plan: exercise stress test done today. Continue home ASA 81mg daily, Metoprolol 12.5mg BID, Cozaar 100mg daily, Lipitor 40mg daily. Telemetry.     Vital Signs (last 3 days)       Date/Time Temp Pulse Resp BP MAP (mmHg) SpO2 Calculated FIO2 (%) - Nasal Cannula Nasal Cannula O2 Flow Rate (L/min) O2 Device Patient Position - Orthostatic VS Pain    08/28/24 15:12:31 98.3 °F (36.8 °C) 64 18 127/48 74 95 % -- -- -- -- --    08/28/24 0800 -- -- -- -- -- -- -- -- -- -- No Pain    08/28/24 06:28:40 98.1 °F (36.7 °C) 71 20 131/55 80 98 % -- -- -- -- --    08/28/24 02:34:09 97.3 °F (36.3 °C) 61 17 118/48 71 95 % -- -- -- Lying --    08/27/24 23:44:11 -- 60 -- 153/60 91 97 % -- -- -- -- --    08/27/24 2000 -- -- -- -- -- 98 % -- -- None (Room air) -- No Pain    08/27/24 17:48:36 -- 71 -- 149/62 91 98 % -- -- -- -- --    08/27/24 15:42:16 98.2 °F (36.8 °C) 68 17 134/58 83 96 % -- -- -- -- --    08/27/24 11:16:44 97.9 °F (36.6 °C) 69 19 146/55 85 96 % -- -- -- -- --    08/27/24 09:03:29 -- 74 -- 142/49 80 96 % -- -- -- -- --    08/27/24 0800 -- -- -- -- -- -- -- -- -- -- No Pain    08/27/24 07:43:56 98 °F (36.7 °C) 70 20 150/64 93 96 % -- -- -- -- --    08/27/24 03:43:29 97.9 °F (36.6 °C) 65 13 128/51 77 97 % -- -- -- -- --    08/27/24 0005 -- -- -- -- -- 97 % -- -- None (Room air) -- --    08/26/24 23:43:56 97.8 °F (36.6 °C) 73 15 144/63 90 94 % -- -- -- -- --    08/26/24 2100 -- -- -- -- -- 95 % -- -- None (Room air) --  --    08/26/24 2005 -- -- -- -- -- -- -- -- -- -- No Pain    08/26/24 1457 97.8 °F (36.6 °C) 65 16 110/45 67 95 % -- -- None (Room air) Lying --    08/26/24 14:00:54 -- 66 -- 130/53 79 97 % -- -- -- -- --    08/26/24 13:53:22 -- 66 -- 124/56 79 96 % -- -- -- -- --    08/26/24 12:37:39 -- 61 -- 127/53 78 95 % -- -- None (Room air) -- No Pain    08/26/24 11:33:23 -- -- -- -- -- -- -- -- -- -- No Pain    08/26/24 11:16:01 -- -- -- -- -- -- -- -- -- -- No Pain    08/26/24 11:15:47 -- -- -- -- -- -- 28 2 L/min Nasal cannula -- --    08/26/24 1101 -- -- -- -- -- -- -- -- -- -- No Pain    08/26/24 1001 -- -- -- -- -- -- -- -- None (Room air) -- --    08/26/24 0927 -- -- -- -- -- 98 % -- -- -- -- --    08/26/24 0926 98.1 °F (36.7 °C) 76 18 131/86 -- 98 % -- -- None (Room air) Lying No Pain          Weight (last 2 days)       Date/Time Weight    08/27/24 07:43:56 67.6 (149)              Pertinent Labs/Diagnostic Results:   Radiology:  CTA chest pe study   Final Interpretation by Jamel Zhang MD (08/27 1927)      No evidence of acute pulm embolus or thoracic aortic aneurysm. No acute cardiopulmonary process.                  Workstation performed: [id number]         XR chest 1 view portable   Final Interpretation by Morgan Fofana MD (08/26 7436)      No acute cardiopulmonary disease.            Workstation performed: [id number]           Cardiology:  Exercise stress test 8/28:  Stress ECG: No ST deviation is noted. The ECG was not diagnostic due to submaximal stress. The stress ECG is negative for ischemia after submaximal exercise, without reproduction of symptoms of chest pain.     Echo complete w/ contrast if indicated   Final Result by Afshan Penaloza MD (08/27 0906)        Left Ventricle: Left ventricular cavity size is normal. Wall thickness    is normal. The left ventricular ejection fraction is 70%. Systolic    function is normal. Wall motion is normal. Diastolic function is normal    for age.     Right  Ventricle: Right ventricular cavity size is normal. Systolic    function is normal.     Left Atrium: The atrium is normal in size.     Right Atrium: The atrium is normal in size.     Atrial Septum: There is a small and patent foramen ovale confirmed at    rest with predominant left to right shunting using color Doppler.    Calculated Qp/Qs ratio is 1.03.     Mitral Valve: There is mild regurgitation.     Tricuspid Valve: There is mild regurgitation.     Pulmonic Valve: There is mild regurgitation.     Prior TTE study available for comparison. Prior study date: 2/5/2016.    Changes noted when compared to prior study. Changes include: PFO is new. .         Cardiac catheterization   Preliminary Result by Juan J Quiroz DO (08/26 1135)        Mild nonobstructive epicardial CAD         ECG 12 lead   Final Result by Cesar Melgar MD (08/26 2115)   Sinus rhythm with 1st degree A-V block   Prolonged QT   Abnormal ECG   When compared with ECG of 26-AUG-2024 09:29, (unconfirmed)   No significant change was found   Confirmed by Cesar Melgar ([id number]) on 8/26/2024 9:15:45 PM      ECG 12 lead   Final Result by Cesar Melgar MD (08/26 2115)   Normal sinus rhythm   Possible Left atrial enlargement   Borderline ECG   When compared with ECG of 21-JAN-2016 13:33,   T wave amplitude has increased in Inferior leads   T wave inversion now evident in Lateral leads   Confirmed by Cesar Melgar ([id number]) on 8/26/2024 9:15:39 PM            Results from last 7 days   Lab Units 08/27/24  0454 08/26/24  0954   WBC Thousand/uL 6.72 6.68   HEMOGLOBIN g/dL 10.5* 12.4   HEMATOCRIT % 32.1* 38.5   PLATELETS Thousands/uL 202 284   TOTAL NEUT ABS Thousands/µL  --  4.35     Results from last 7 days   Lab Units 08/27/24  0454 08/26/24  0954   SODIUM mmol/L 141 139   POTASSIUM mmol/L 3.8 3.7   CHLORIDE mmol/L 108 104   CO2 mmol/L 26 29   ANION GAP mmol/L 7 6   BUN mg/dL 18 16   CREATININE mg/dL 0.84 0.91   EGFR ml/min/1.73sq m 66 60    CALCIUM mg/dL 8.4 9.2     Results from last 7 days   Lab Units 08/27/24  0454 08/26/24  0954   GLUCOSE RANDOM mg/dL 90 91       Medications:   Scheduled Medications:  aspirin, 81 mg, Oral, Daily  atorvastatin, 40 mg, Oral, After Dinner  heparin (porcine), 5,000 Units, Subcutaneous, Q8H  losartan, 100 mg, Oral, Daily   And  hydroCHLOROthiazide, 25 mg, Oral, Daily  metoprolol succinate, 25 mg, Oral, Daily  oxybutynin, 10 mg, Oral, Daily  polyethylene glycol, 17 g, Oral, Daily       PRN Meds:  acetaminophen, 650 mg, Oral, Q4H PRN  ondansetron, 4 mg, Intravenous, Q6H PRN  senna, 1 tablet, Oral, HS PRN        Discharge Plan: home with op cardiology f/u    Network Utilization Review Department  ATTENTION: Please call with any questions or concerns to [phone] and carefully listen to the prompts so that you are directed to the right person. All voicemails are confidential.   For Discharge needs, contact Care Management DC Support Team at [phone] opt. 2  Send all requests for admission clinical reviews, approved or denied determinations and any other requests to dedicated fax number below belonging to the campus where the patient is receiving treatment. List of dedicated fax numbers for the Facilities:  FACILITY NAME UR FAX NUMBER   ADMISSION DENIALS (Administrative/Medical Necessity) [phone]   DISCHARGE SUPPORT TEAM (NETWORK) [phone]   PARENT CHILD HEALTH (Maternity/NICU/Pediatrics) [phone]   [facility] [phone]   [facility] [phone]   [facility] [phone]   [facility] [phone]   [facility] [phone]   [facility] [phone]   [facility] [phone]   [facility] [phone]   [facility] [phone]   [facility]  [facility] [phone]   [facility] [phone]   [facility] [phone]

## 2024-08-28 NOTE — ASSESSMENT & PLAN NOTE
Continue ARB/HCTZ  Ambulatory blood pressure monitoring and follow-up with primary care physician recommended

## 2024-08-29 ENCOUNTER — TELEPHONE (OUTPATIENT)
Age: 79
End: 2024-08-29

## 2024-08-29 ENCOUNTER — TRANSITIONAL CARE MANAGEMENT (OUTPATIENT)
Dept: FAMILY MEDICINE CLINIC | Facility: CLINIC | Age: 79
End: 2024-08-29

## 2024-08-29 RX ORDER — METOPROLOL SUCCINATE 25 MG/1
25 TABLET, EXTENDED RELEASE ORAL DAILY
Qty: 90 TABLET | Refills: 0 | Status: SHIPPED | OUTPATIENT
Start: 2024-08-29 | End: 2024-11-27

## 2024-08-29 RX ORDER — ATORVASTATIN CALCIUM 40 MG/1
40 TABLET, FILM COATED ORAL
Qty: 90 TABLET | Refills: 0 | Status: SHIPPED | OUTPATIENT
Start: 2024-08-29 | End: 2024-11-27

## 2024-08-29 NOTE — TELEPHONE ENCOUNTER
Hello,    The following message was sent via e-mail to the leadership team:     Please advise if you can help facilitate the following overbook request:    Patient Name: Luis Kelly    Patient MRN: [id number]     Call back #: [phone]    Insurance: [employer] Medicare Replacement    Department:Cardiology    Speciality: EP    Reason for overbook request: OTHER (PLEASE WRITE REASON IN COMMENT SECTION)    Comments : Patient hospitalized from 8/25/2024 to 8/28/2024.  Prior to hospitalization the only cardiologist the patient saw was Dr Bolanos.  Patient saw Dr. Melgar in consultation while hospitalized.  Patient called stating she was told to followup with Dr. Bolanos in 1 week's time for a hospital followup.      Requested doctor and location: Dr. Bolanos/[facility], but will go to [facility]    Date of current appointment: Nothing scheduled.      Thank you.

## 2024-09-10 ENCOUNTER — OFFICE VISIT (OUTPATIENT)
Dept: CARDIOLOGY CLINIC | Facility: CLINIC | Age: 79
End: 2024-09-10
Payer: COMMERCIAL

## 2024-09-10 VITALS
BODY MASS INDEX: 28.15 KG/M2 | HEART RATE: 70 BPM | SYSTOLIC BLOOD PRESSURE: 142 MMHG | DIASTOLIC BLOOD PRESSURE: 90 MMHG | HEIGHT: 61 IN

## 2024-09-10 DIAGNOSIS — R07.89 CHEST DISCOMFORT: ICD-10-CM

## 2024-09-10 DIAGNOSIS — R42 EPISODIC LIGHTHEADEDNESS: ICD-10-CM

## 2024-09-10 DIAGNOSIS — K21.9 GERD WITHOUT ESOPHAGITIS: Primary | ICD-10-CM

## 2024-09-10 DIAGNOSIS — R00.2 PALPITATIONS: ICD-10-CM

## 2024-09-10 DIAGNOSIS — E78.5 MILD HYPERLIPIDEMIA: ICD-10-CM

## 2024-09-10 DIAGNOSIS — Q21.12 PFO (PATENT FORAMEN OVALE): ICD-10-CM

## 2024-09-10 DIAGNOSIS — I10 HYPERTENSION, UNSPECIFIED TYPE: ICD-10-CM

## 2024-09-10 PROCEDURE — 99214 OFFICE O/P EST MOD 30 MIN: CPT | Performed by: INTERNAL MEDICINE

## 2024-09-10 PROCEDURE — 93000 ELECTROCARDIOGRAM COMPLETE: CPT | Performed by: INTERNAL MEDICINE

## 2024-09-10 NOTE — PROGRESS NOTES
EPS Progress Note - Luis Kelly 79 y.o. female MRN: [id number]           ASSESSMENT:  1. GERD without esophagitis        2. Chest discomfort        3. Episodic lightheadedness        4. Mild hyperlipidemia        5. Palpitations        6. PFO (patent foramen ovale)        7. Hypertension, unspecified type          Stress ECG: No ST deviation is noted. The ECG was not diagnostic due to submaximal stress. The stress ECG is negative for ischemia after submaximal exercise, without reproduction of symptoms of chest pain.        Resting ECG    Resting ECG ECG is abnormal. The ECG shows normal sinus rhythm. Moderate T wave inversion (I, aVL, V1 and V2) is noted.   Stress Findings    Stress Findings A modified Satish protocol stress test was performed. Overall, the patient's exercise capacity was normal for their age. The patient reached stage 2.0 of the protocol after exercising for 10 min and 30 sec and had a maximal HR of 120 bpm (84% of MPHR) and 6.4 METS. The patient experienced no angina during the test. The patient achieved their maximal exercise threshold. The patient reported dyspnea and leg pain during the stress test. Onset of symptoms occurred at stage 0.5 of the protocol. Symptoms began during stress and ended during recovery.   Stress ECG    Stress ECG No ST deviation is noted. Arrhythmias during stress: rare PACs. There were no arrhythmias during recovery. . The ECG was not diagnostic due to submaximal stress. The stress ECG is negative for ischemia after submaximal exercise, without reproduction of symptoms of chest pain.     Echo:  Interpretation Summary  Show Result Comparison     Left Ventricle: Left ventricular cavity size is normal. Wall thickness is normal. The left ventricular ejection fraction is 70%. Systolic function is normal. Wall motion is normal. Diastolic function is normal for age.    Right Ventricle: Right ventricular cavity size is normal. Systolic function is normal.    Left Atrium:  The atrium is normal in size.    Right Atrium: The atrium is normal in size.    Atrial Septum: There is a small and patent foramen ovale confirmed at rest with predominant left to right shunting using color Doppler. Calculated Qp/Qs ratio is 1.03.    Mitral Valve: There is mild regurgitation.    Tricuspid Valve: There is mild regurgitation.    Pulmonic Valve: There is mild regurgitation.    Prior TTE study available for comparison. Prior study date: 2/5/2016. Changes noted when compared to prior study. Changes include: PFO is new. .         ECG NSR with TWI V2 improved from admission ecg 8/26    PLAN:  #1.  Chest discomfort undetermined etiology she did have EKG changes.  The EKG changes have essentially resolved at this point in time and EKG is much more normalized now.  Possible myocarditis from COVID but her ejection fraction is completely normal and cardiac catheterization showed no significant coronary artery disease obstruction she was sent home on beta-blockers and Lipitor and appears to be doing very well she is currently chest pain-free just a bit winded on hills which is likely just deconditioning    #2.  History of palpitations and presyncope no episodes recently    #3.  Hyperlipidemia now on Lipitor    4.  PFO not clinically significant    5.  Hypertension blood pressure 140/60 today runs in the 120s at home well-controlled    HPI:   Interim history walking around with no chest discomfort except winded with hills.  HR 70 bpm.      Hospital follow-up for episode of chest discomfort with significant EKG changes however cardiac catheterization stress test and echo were essentially normal.        Review of Systems   Constitutional: Negative for chills and fever.   HENT:  Negative for congestion and sore throat.    Eyes:  Negative for blurred vision and double vision.   Cardiovascular:  Negative for chest pain, claudication, dyspnea on exertion, leg swelling, near-syncope, orthopnea, palpitations, paroxysmal  nocturnal dyspnea and syncope.   Respiratory:  Negative for cough, shortness of breath and sputum production.    Hematologic/Lymphatic: Negative for bleeding problem. Does not bruise/bleed easily.   Skin:  Negative for itching and rash.   Musculoskeletal:  Negative for arthritis and joint pain.   Gastrointestinal:  Negative for abdominal pain, nausea and vomiting.   Genitourinary:  Negative for hematuria.   Neurological:  Negative for dizziness, focal weakness, headaches, light-headedness and weakness.   Psychiatric/Behavioral:  Negative for depression. The patient is not nervous/anxious.           Objective:     Vitals: not currently breastfeeding., There is no height or weight on file to calculate BMI.,        Physical Exam:    GEN: Luis Kelly appears well, alert and oriented x 3, pleasant and cooperative   HEENT: pupils equal, round, and reactive to light; extraocular muscles intact  NECK: supple, no carotid bruits   HEART: regular rhythm, normal S1 and S2, no murmurs, clicks, gallops or rubs   LUNGS: clear to auscultation bilaterally; no wheezes, rales, or rhonchi   ABDOMEN: normal bowel sounds, soft, no tenderness, no distention  EXTREMITIES: peripheral pulses normal; no clubbing, cyanosis, or edema  NEURO: no focal findings   SKIN: normal without suspicious lesions on exposed skin    Medications:      Current Outpatient Medications:     aspirin 81 MG tablet, Take 1 tablet by mouth daily, Disp: , Rfl:     atorvastatin (LIPITOR) 40 mg tablet, Take 1 tablet (40 mg total) by mouth daily after dinner, Disp: 90 tablet, Rfl: 0    Cholecalciferol (VITAMIN D3) 2000 units capsule, Take 1 tablet by mouth daily, Disp: , Rfl:     metoprolol succinate (TOPROL-XL) 25 mg 24 hr tablet, Take 1 tablet (25 mg total) by mouth daily, Disp: 90 tablet, Rfl: 0    Multiple Vitamin-Folic Acid TABS, Take 1 tablet by mouth daily, Disp: , Rfl:     Psyllium 28.3 % POWD, Take by mouth, Disp: , Rfl:     solifenacin (VESICARE) 10 MG  tablet, Take 1 tablet (10 mg total) by mouth daily, Disp: 90 tablet, Rfl: 3    valsartan-hydrochlorothiazide (DIOVAN-HCT) 320-25 MG per tablet, Take 1 tablet by mouth daily, Disp: 90 tablet, Rfl: 3     Family History   Problem Relation Age of Onset    Hypertension Mother     Dementia Mother     Heart disease Mother     Hearing loss Mother     Coronary artery disease Father     Diverticulitis Father         Colon    Stroke Father         Syndrome    Venous thrombosis Father         of the deep Vessels of the distal lower extremity    Hypertension Father     Heart disease Father     Diabetes Brother     Lung cancer Brother 50    Venous thrombosis Brother         of the deep vessels of the distal Lower extremity    Melanoma Brother     Hypertension Brother     Heart disease Brother     Cancer Brother     Diabetes Paternal Grandfather     Lung cancer Family         Adenocarcinoma of the Lung    Other Family         Disorders of Blood and Blood-forming Organs, Skin Disorder    Heart disease Family     Colon cancer Paternal Aunt     No Known Problems Daughter     No Known Problems Maternal Grandmother     No Known Problems Maternal Grandfather     No Known Problems Paternal Grandmother      Social History     Socioeconomic History    Marital status: /Civil Union     Spouse name: Not on file    Number of children: Not on file    Years of education: Not on file    Highest education level: Not on file   Occupational History    Not on file   Tobacco Use    Smoking status: Never     Passive exposure: Past    Smokeless tobacco: Never   Vaping Use    Vaping status: Never Used   Substance and Sexual Activity    Alcohol use: Yes     Alcohol/week: 1.0 standard drink of alcohol     Types: 1 Glasses of wine per week     Comment: Rare    Drug use: No    Sexual activity: Not Currently     Partners: Male     Birth control/protection: Post-menopausal   Other Topics Concern    Not on file   Social History Narrative    Caffeine use     Exercise frequency      Social Determinants of Health     Financial Resource Strain: Patient Declined (6/1/2023)    Overall Financial Resource Strain (CARDIA)     Difficulty of Paying Living Expenses: Patient declined   Food Insecurity: No Food Insecurity (8/28/2024)    Hunger Vital Sign     Worried About Running Out of Food in the Last Year: Never true     Ran Out of Food in the Last Year: Never true   Transportation Needs: No Transportation Needs (8/28/2024)    PRAPARE - Transportation     Lack of Transportation (Medical): No     Lack of Transportation (Non-Medical): No   Physical Activity: Not on file   Stress: Not on file   Social Connections: Not on file   Intimate Partner Violence: Not on file   Housing Stability: Low Risk  (8/28/2024)    Housing Stability Vital Sign     Unable to Pay for Housing in the Last Year: No     Number of Times Moved in the Last Year: 0     Homeless in the Last Year: No     Social History     Tobacco Use   Smoking Status Never    Passive exposure: Past   Smokeless Tobacco Never     Social History     Substance and Sexual Activity   Alcohol Use Yes    Alcohol/week: 1.0 standard drink of alcohol    Types: 1 Glasses of wine per week    Comment: Rare       Labs & Results:  Below is the patient's most recent value for Albumin, ALT, AST, BUN, Calcium, Chloride, Cholesterol, CO2, Creatinine, GFR, Glucose, HDL, Hematocrit, Hemoglobin, Hemoglobin A1C, LDL, Magnesium, Phosphorus, Platelets, Potassium, PSA, Sodium, Triglycerides, and WBC.   Lab Results   Component Value Date    ALT 13 08/26/2024    AST 16 08/26/2024    BUN 18 08/27/2024    CALCIUM 8.4 08/27/2024     08/27/2024    CHOL 195 09/12/2017    CO2 26 08/27/2024    CREATININE 0.84 08/27/2024    HDL 45 (L) 08/26/2024    HCT 32.1 (L) 08/27/2024    HGB 10.5 (L) 08/27/2024    MG 2.1 02/18/2016     08/27/2024    K 3.8 08/27/2024     09/12/2017    TRIG 114 08/26/2024    WBC 6.72 08/27/2024     Note: for a comprehensive  list of the patient's lab results, access the Results Review activity.            Cardiac testing:   Results for orders placed during the hospital encounter of 16    Echo complete with contrast if indicated    Narrative  [address] [address] [phone]    Transthoracic Echocardiogram  2D, M-mode, Doppler, and Color Doppler    Study date:  2016    Patient: ELIZABETH RANDALL  MR number: [id number]  Account number: [id number]  : 1945  Age: 70 years  Gender: Female  Status: Outpatient  Location: Bedside  Height: 61 in  Weight: 159 lb  BP: 140/ 62 mmHg    Indications: Palpitations.    Diagnoses: R00.2 - Palpitations    Sonographer:  ADORE Garcia  Primary Physician:  Shannon Reis DO  Group:  [facility]  Interpreting Physician:  Malcolm Quinn MD    SUMMARY    LEFT VENTRICLE:  Systolic function was normal by visual assessment. Ejection fraction was  estimated to be 70 %.  There were no regional wall motion abnormalities.    RIGHT VENTRICLE:  The size was normal.  Systolic function was normal.    MITRAL VALVE:  There was mild regurgitation.    TRICUSPID VALVE:  There was mild to moderate regurgitation.    PULMONIC VALVE:  There was trace regurgitation.    IVC, HEPATIC VEINS:  Respirophasic changes were blunted (less than 50% variation).    HISTORY: PRIOR HISTORY: Risk factors: hypertension.    PROCEDURE: The procedure was performed at the bedside. This was a routine  study. The transthoracic approach was used. The study included complete 2D  imaging, M-mode, complete spectral Doppler, and color Doppler. The heart rate  was 60 bpm, at the start of the study. Images were obtained from the  parasternal, apical, subcostal, and suprasternal notch acoustic windows.  Echocardiographic views were limited due to poor acoustic window availability,  decreased penetration, and lung interference. This was a technically  difficult  study.    LEFT VENTRICLE: Size was normal. Systolic function was normal by visual  assessment. Ejection fraction was estimated to be 70 %. There were no regional  wall motion abnormalities. Wall thickness was normal. DOPPLER: Left ventricular  diastolic function parameters were normal.    RIGHT VENTRICLE: The size was normal. Systolic function was normal. Wall  thickness was normal.    LEFT ATRIUM: Size was normal.    RIGHT ATRIUM: Size was normal.    MITRAL VALVE: Valve structure was normal. There was normal leaflet separation.  DOPPLER: The transmitral velocity was within the normal range. There was no  evidence for stenosis. There was mild regurgitation.    AORTIC VALVE: The valve was trileaflet. Leaflets exhibited normal thickness and  normal cuspal separation. DOPPLER: Transaortic velocity was within the normal  range. There was no evidence for stenosis. There was no significant  regurgitation.    TRICUSPID VALVE: The valve structure was normal. There was normal leaflet  separation. DOPPLER: The transtricuspid velocity was within the normal range.  There was no evidence for stenosis. There was mild to moderate regurgitation.  Estimated peak PA pressure was 25 mmHg.    PULMONIC VALVE: Not well visualized. DOPPLER: The transpulmonic velocity was  within the normal range. There was trace regurgitation.    PERICARDIUM: There was no pericardial effusion. The pericardium was normal in  appearance.    AORTA: The root exhibited normal size.    SYSTEMIC VEINS: IVC: The inferior vena cava was normal in size. Respirophasic  changes were blunted (less than 50% variation).    MEASUREMENT TABLES    OTHER ECHO MEASUREMENTS  (Reference normals)  Estimated CVP   8 mmHg   (--)    SYSTEM MEASUREMENT TABLES    2D mode  AV Diam: 3.5 cm  AoR Diam; Mean (2D): 3.5 cm  LA Diam (2D): 3.4 cm  LA Dimension; Mean (2D): 3.4 cm  LA/Ao (2D): 0.97  EDV (2D-Cubed): 41.8 cm3  EF (2D-Cubed): 77.5 %  ESV (2D-Cubed): 9.39 cm3  FS  (2D-Cubed): 39.2 %  FS (2D-Teich): 39.2 %  IVS/LVPW (2D): 1.09  IVSd (2D): 0.9 cm  IVSd; Mean chosen (2D): 0.9 cm  LVIDd (2D): 3.47 cm  LVIDd; Mean (2D): 3.47 cm  LVIDs (2D): 2.11 cm  LVIDs; Mean (2D): 2.11 cm  LVPWd (2D): 0.83 cm  LVPWd; Mean (2D): 0.83 cm  Left Ventricular Ejection Fraction; Teichholz; 2D mode;: 70.7 %  Left Ventricular End Diastolic Volume; Teichholz; 2D mode;: 49.8 cm3  Left Ventricular End Systolic Volume; Teichholz; 2D mode;: 14.6 cm3  SV (2D-Cubed): 32.4 cm3  Stroke Volume; Teichholz; 2D mode;: 35.2 cm3  RVIDd (2D): 3.59 cm  RVIDd; Mean (2D): 3.59 cm    Apical four chamber  LV MOD Diam; Recent value; End Diastole (A4C): 1.85 cm  LV MOD Diam; Recent value; End Diastole (A4C): 4.14 cm  LV MOD Diam; Recent value; End Diastole (A4C): 3.84 cm  LV MOD Diam; Recent value; End Diastole (A4C): 4.24 cm  LV MOD Diam; Recent value; End Diastole (A4C): 4.26 cm  LV MOD Diam; Recent value; End Diastole (A4C): 4.21 cm  LV MOD Diam; Recent value; End Diastole (A4C): 4.03 cm  LV MOD Diam; Recent value; End Diastole (A4C): 1.5 cm  LV MOD Diam; Recent value; End Diastole (A4C): 2.24 cm  LV MOD Diam; Recent value; End Diastole (A4C): 2.71 cm  LV MOD Diam; Recent value; End Diastole (A4C): 3.03 cm  LV MOD Diam; Recent value; End Diastole (A4C): 3.27 cm  LV MOD Diam; Recent value; End Diastole (A4C): 3.52 cm  LV MOD Diam; Recent value; End Diastole (A4C): 3.74 cm  LV MOD Diam; Recent value; End Diastole (A4C): 3.89 cm  LV MOD Diam; Recent value; End Diastole (A4C): 3.96 cm  LV MOD Diam; Recent value; End Diastole (A4C): 3.99 cm  LV MOD Diam; Recent value; End Diastole (A4C): 4.11 cm  LV MOD Diam; Recent value; End Diastole (A4C): 4.24 cm  LV MOD Diam; Recent value; End Diastole (A4C): 4.26 cm  LV MOD Diam; Recent value; End Systole (A4C): 1.65 cm  LV MOD Diam; Recent value; End Systole (A4C): 3.06 cm  LV MOD Diam; Recent value; End Systole (A4C): 3.06 cm  LV MOD Diam; Recent value; End Systole (A4C): 2.92 cm  LV  MOD Diam; Recent value; End Systole (A4C): 2.72 cm  LV MOD Diam; Recent value; End Systole (A4C): 1.75 cm  LV MOD Diam; Recent value; End Systole (A4C): 2.02 cm  LV MOD Diam; Recent value; End Systole (A4C): 2.21 cm  LV MOD Diam; Recent value; End Systole (A4C): 2.36 cm  LV MOD Diam; Recent value; End Systole (A4C): 2.48 cm  LV MOD Diam; Recent value; End Systole (A4C): 2.55 cm  LV MOD Diam; Recent value; End Systole (A4C): 2.55 cm  LV MOD Diam; Recent value; End Systole (A4C): 2.6 cm  LV MOD Diam; Recent value; End Systole (A4C): 2.65 cm  LV MOD Diam; Recent value; End Systole (A4C): 0.83 cm  LV MOD Diam; Recent value; End Systole (A4C): 1.19 cm  LV MOD Diam; Recent value; End Systole (A4C): 1.51 cm  LV MOD Diam; Recent value; End Systole (A4C): 2.8 cm  LV MOD Diam; Recent value; End Systole (A4C): 3.01 cm  LV MOD Diam; Recent value; End Systole (A4C): 3.06 cm  LVEF MOD A4C: 68.8 %  Left Ventricle diastolic major axis; Most recent value chosen; Method of Disks,  Single Plane; 2D mode; Apical four chamber;: 7.88 cm Left Ventricle systolic  major axis; Most recent value chosen; Method of Disks, Single Plane; 2D mode;  Apical four chamber;: 5.52 cm Left Ventricular Diastolic Area; Most recent  value chosen; Method of Disks, Single Plane; 2D mode; Apical four chamber;:  2810 mm2 Left Ventricular End Diastolic Volume; Most recent value chosen;  Method of Disks, Single Plane; 2D mode; Apical four chamber;: 82.2 cm3 Left  Ventricular End Systolic Volume; Most recent value chosen; Method of Disks,  Single Plane; 2D mode; Apical four chamber;: 25.7 cm3 Left Ventricular Systolic  Area; Most recent value chosen; Method of Disks, Single Plane; 2D mode; Apical  four chamber;: 1300 mm2  SV MOD A4C: 56.6 cm3    Tissue Doppler Imaging  LV Peak Early Vo Tissue Zafar; Medial MA (TDI): 69.6 mm/s  Left Ventricular Peak Early Diastolic Tissue Velocity; Mean; Mean value chosen;  Medial Mitral Annulus; Tissue Doppler Imaging;: 69.6  mm/s    Unspecified Scan Mode  MV Peak Zafar/LV Peak Tissue Zafar E-Wave; Medial MA: 12.4  Dec Florence; Antegrade Flow: 5140 mm/s2  Dec Florence; Mean; Antegrade Flow: 5140 mm/s2  MV A Zafar: 762 mm/s  MV E Zafar: 863 mm/s  MV E/A Ratio: 1.1  MV Peak A Zafar: 762 mm/s  MV Peak E Zafar; Mean; Antegrade Flow: 863 mm/s  Peak Grad; Mean; Regurgitant Flow: 21 mm[Hg]  Vmax; Mean; Regurgitant Flow: 2300 mm/s  Vmax; Regurgitant Flow: 2390 mm/s  Vmax; Regurgitant Flow: 2200 mm/s    [employer] Accredited Echocardiography Laboratory    Prepared and electronically signed by    Malcolm Quinn MD  Signed 05-Feb-2016 17:39:45    No results found for this or any previous visit.    No results found for this or any previous visit.    No results found for this or any previous visit.                 DNR Order

## 2024-11-04 DIAGNOSIS — R07.9 CHEST PAIN: ICD-10-CM

## 2024-11-04 NOTE — TELEPHONE ENCOUNTER
Medication: atorvastatin 40mg tablet    Dose/Frequency: 1 tablet daily    Quantity: 90 tablets     Medication: metoprolol succinate 25mg tablet     Dose/Frequency: 1 tablet daily    Quantity: 90 tablets     Pharmacy: [facility] pharmacy    Office:   [] PCP/Provider -   [x] Speciality/Provider -     Does the patient have enough for 3 days?   [x] Yes   [] No - Send as HP to POD

## 2024-11-05 RX ORDER — METOPROLOL SUCCINATE 25 MG/1
25 TABLET, EXTENDED RELEASE ORAL DAILY
Qty: 90 TABLET | Refills: 1 | Status: SHIPPED | OUTPATIENT
Start: 2024-11-05 | End: 2025-02-03

## 2024-11-05 RX ORDER — ATORVASTATIN CALCIUM 40 MG/1
40 TABLET, FILM COATED ORAL
Qty: 90 TABLET | Refills: 1 | Status: SHIPPED | OUTPATIENT
Start: 2024-11-05 | End: 2025-02-03

## 2024-12-16 DIAGNOSIS — L71.9 ROSACEA: ICD-10-CM

## 2024-12-16 RX ORDER — METRONIDAZOLE 7.5 MG/G
GEL TOPICAL 2 TIMES DAILY
Qty: 45 G | Refills: 5 | Status: SHIPPED | OUTPATIENT
Start: 2024-12-16

## 2025-03-19 ENCOUNTER — OFFICE VISIT (OUTPATIENT)
Dept: CARDIOLOGY CLINIC | Facility: CLINIC | Age: 80
End: 2025-03-19
Payer: COMMERCIAL

## 2025-03-19 VITALS
BODY MASS INDEX: 28.94 KG/M2 | HEIGHT: 61 IN | SYSTOLIC BLOOD PRESSURE: 110 MMHG | HEART RATE: 56 BPM | WEIGHT: 153.3 LBS | DIASTOLIC BLOOD PRESSURE: 64 MMHG

## 2025-03-19 DIAGNOSIS — I10 HYPERTENSION, UNSPECIFIED TYPE: ICD-10-CM

## 2025-03-19 DIAGNOSIS — R00.2 PALPITATIONS: Primary | ICD-10-CM

## 2025-03-19 DIAGNOSIS — R07.9 CHEST PAIN: ICD-10-CM

## 2025-03-19 DIAGNOSIS — E78.5 MILD HYPERLIPIDEMIA: ICD-10-CM

## 2025-03-19 PROCEDURE — 99213 OFFICE O/P EST LOW 20 MIN: CPT | Performed by: INTERNAL MEDICINE

## 2025-03-19 RX ORDER — ATORVASTATIN CALCIUM 40 MG/1
40 TABLET, FILM COATED ORAL
Qty: 90 TABLET | Refills: 3 | Status: SHIPPED | OUTPATIENT
Start: 2025-03-19 | End: 2025-06-17

## 2025-03-19 RX ORDER — METOPROLOL SUCCINATE 25 MG/1
25 TABLET, EXTENDED RELEASE ORAL DAILY
Qty: 90 TABLET | Refills: 3 | Status: SHIPPED | OUTPATIENT
Start: 2025-03-19 | End: 2025-06-17

## 2025-03-31 PROCEDURE — 93000 ELECTROCARDIOGRAM COMPLETE: CPT | Performed by: INTERNAL MEDICINE

## 2025-03-31 NOTE — PROGRESS NOTES
"EPS Progress Note - Luis Kelly 79 y.o. female MRN: [id number]           ASSESSMENT:  1. Palpitations  POCT ECG      2. Hypertension, unspecified type        3. Mild hyperlipidemia        4. Chest pain  atorvastatin (LIPITOR) 40 mg tablet    metoprolol succinate (TOPROL-XL) 25 mg 24 hr tablet              PLAN:  Continue current medication she is stable from a cardiac perspective.  Cardiac catheterization negative for CAD 8/20/2024    HPI:   Interim history she has stable cardiac symptoms.  She does get some shortness of breath and chest tightness when she walks up a hill that goes away when she rests a little bit or slows down otherwise she offers no complaints today.          ROS   Positive shortness of breath with hills and gets chest heaviness it goes away when she rests otherwise no complaints this has been stable.  No presyncope or syncope    Objective:     Vitals: Blood pressure 110/64, pulse 56, height 5' 1\" (1.549 m), weight 69.5 kg (153 lb 4.8 oz), not currently breastfeeding., Body mass index is 28.97 kg/m².,        Physical Exam:    GEN: Luis Kelly appears well, alert and oriented x 3, pleasant and cooperative   HEENT: pupils equal, round, and reactive to light; extraocular muscles intact  NECK: supple, no carotid bruits   HEART: regular rhythm, normal S1 and S2, no murmurs, clicks, gallops or rubs   LUNGS: clear to auscultation bilaterally; no wheezes, rales, or rhonchi   ABDOMEN: normal bowel sounds, soft, no tenderness, no distention  EXTREMITIES: peripheral pulses normal; no clubbing, cyanosis, or edema  NEURO: no focal findings   SKIN: normal without suspicious lesions on exposed skin    Medications:      Current Outpatient Medications:     aspirin 81 MG tablet, Take 1 tablet by mouth daily, Disp: , Rfl:     atorvastatin (LIPITOR) 40 mg tablet, Take 1 tablet (40 mg total) by mouth daily after dinner, Disp: 90 tablet, Rfl: 3    Cholecalciferol (VITAMIN D3) 2000 units capsule, Take 1 tablet " by mouth daily, Disp: , Rfl:     metoprolol succinate (TOPROL-XL) 25 mg 24 hr tablet, Take 1 tablet (25 mg total) by mouth daily, Disp: 90 tablet, Rfl: 3    metroNIDAZOLE (METROGEL) 0.75 % gel, Apply topically 2 (two) times a day, Disp: 45 g, Rfl: 5    Multiple Vitamin-Folic Acid TABS, Take 1 tablet by mouth daily, Disp: , Rfl:     Psyllium 28.3 % POWD, Take by mouth, Disp: , Rfl:     solifenacin (VESICARE) 10 MG tablet, Take 1 tablet (10 mg total) by mouth daily, Disp: 90 tablet, Rfl: 3    valsartan-hydrochlorothiazide (DIOVAN-HCT) 320-25 MG per tablet, Take 1 tablet by mouth daily, Disp: 90 tablet, Rfl: 3     Family History   Problem Relation Age of Onset    Hypertension Mother     Dementia Mother     Heart disease Mother     Hearing loss Mother     Coronary artery disease Father     Diverticulitis Father         Colon    Stroke Father         Syndrome    Venous thrombosis Father         of the deep Vessels of the distal lower extremity    Hypertension Father     Heart disease Father     Diabetes Brother     Lung cancer Brother 50    Venous thrombosis Brother         of the deep vessels of the distal Lower extremity    Melanoma Brother     Hypertension Brother     Heart disease Brother     Cancer Brother     Diabetes Paternal Grandfather     Lung cancer Family         Adenocarcinoma of the Lung    Other Family         Disorders of Blood and Blood-forming Organs, Skin Disorder    Heart disease Family     Colon cancer Paternal Aunt     No Known Problems Daughter     No Known Problems Maternal Grandmother     No Known Problems Maternal Grandfather     No Known Problems Paternal Grandmother      Social History     Socioeconomic History    Marital status: /Civil Union     Spouse name: Not on file    Number of children: Not on file    Years of education: Not on file    Highest education level: Not on file   Occupational History    Not on file   Tobacco Use    Smoking status: Never     Passive exposure: Past     Smokeless tobacco: Never   Vaping Use    Vaping status: Never Used   Substance and Sexual Activity    Alcohol use: Yes     Alcohol/week: 1.0 standard drink of alcohol     Types: 1 Glasses of wine per week     Comment: Rare    Drug use: No    Sexual activity: Not Currently     Partners: Male     Birth control/protection: Post-menopausal   Other Topics Concern    Not on file   Social History Narrative    Caffeine use    Exercise frequency      Social Drivers of Health     Financial Resource Strain: Patient Declined (6/1/2023)    Overall Financial Resource Strain (CARDIA)     Difficulty of Paying Living Expenses: Patient declined   Food Insecurity: No Food Insecurity (8/28/2024)    Nursing - Inadequate Food Risk Classification     Worried About Running Out of Food in the Last Year: Never true     Ran Out of Food in the Last Year: Never true     Ran Out of Food in the Last Year: Not on file   Transportation Needs: No Transportation Needs (8/28/2024)    PRAPARE - Transportation     Lack of Transportation (Medical): No     Lack of Transportation (Non-Medical): No   Physical Activity: Not on file   Stress: Not on file   Social Connections: Not on file   Intimate Partner Violence: Not on file   Housing Stability: Low Risk  (8/28/2024)    Housing Stability Vital Sign     Unable to Pay for Housing in the Last Year: No     Number of Times Moved in the Last Year: 0     Homeless in the Last Year: No     Social History     Tobacco Use   Smoking Status Never    Passive exposure: Past   Smokeless Tobacco Never     Social History     Substance and Sexual Activity   Alcohol Use Yes    Alcohol/week: 1.0 standard drink of alcohol    Types: 1 Glasses of wine per week    Comment: Rare       Labs & Results:  Below is the patient's most recent value for Albumin, ALT, AST, BUN, Calcium, Chloride, Cholesterol, CO2, Creatinine, GFR, Glucose, HDL, Hematocrit, Hemoglobin, Hemoglobin A1C, LDL, Magnesium, Phosphorus, Platelets, Potassium, PSA,  Sodium, Triglycerides, and WBC.   Lab Results   Component Value Date    ALT 13 2024    AST 16 2024    BUN 18 2024    CALCIUM 8.4 2024     2024    CHOL 195 2017    CO2 26 2024    CREATININE 0.84 2024    HDL 62 10/04/2024    HCT 32.1 (L) 2024    HGB 10.5 (L) 2024    HGBA1C 5.7 (H) 10/04/2024    MG 2.1 2016     2024    K 3.8 2024     2017    TRIG 61 10/04/2024    WBC 6.72 2024     Note: for a comprehensive list of the patient's lab results, access the Results Review activity.            Cardiac testing:   Results for orders placed during the hospital encounter of 16    Echo complete with contrast if [place] [address]  [phone]    Transthoracic Echocardiogram  2D, M-mode, Doppler, and Color Doppler    Study date:  2016    Patient: ELIZABETH RANDALL  MR number: [id number]  Account number: [id number]  : 1945  Age: 70 years  Gender: Female  Status: Outpatient  Location: Bedside  Height: 61 in  Weight: 159 lb  BP: 140/ 62 mmHg    Indications: Palpitations.    Diagnoses: R00.2 - Palpitations    Sonographer:  ADORE Garcia  Primary Physician:  Shannon Reis DO  Group:  [facility]  Interpreting Physician:  Malcolm Quinn MD    SUMMARY    LEFT VENTRICLE:  Systolic function was normal by visual assessment. Ejection fraction was  estimated to be 70 %.  There were no regional wall motion abnormalities.    RIGHT VENTRICLE:  The size was normal.  Systolic function was normal.    MITRAL VALVE:  There was mild regurgitation.    TRICUSPID VALVE:  There was mild to moderate regurgitation.    PULMONIC VALVE:  There was trace regurgitation.    IVC, HEPATIC VEINS:  Respirophasic changes were blunted (less than 50% variation).    HISTORY: PRIOR HISTORY: Risk factors: hypertension.    PROCEDURE:  The procedure was performed at the bedside. This was a routine  study. The transthoracic approach was used. The study included complete 2D  imaging, M-mode, complete spectral Doppler, and color Doppler. The heart rate  was 60 bpm, at the start of the study. Images were obtained from the  parasternal, apical, subcostal, and suprasternal notch acoustic windows.  Echocardiographic views were limited due to poor acoustic window availability,  decreased penetration, and lung interference. This was a technically difficult  study.    LEFT VENTRICLE: Size was normal. Systolic function was normal by visual  assessment. Ejection fraction was estimated to be 70 %. There were no regional  wall motion abnormalities. Wall thickness was normal. DOPPLER: Left ventricular  diastolic function parameters were normal.    RIGHT VENTRICLE: The size was normal. Systolic function was normal. Wall  thickness was normal.    LEFT ATRIUM: Size was normal.    RIGHT ATRIUM: Size was normal.    MITRAL VALVE: Valve structure was normal. There was normal leaflet separation.  DOPPLER: The transmitral velocity was within the normal range. There was no  evidence for stenosis. There was mild regurgitation.    AORTIC VALVE: The valve was trileaflet. Leaflets exhibited normal thickness and  normal cuspal separation. DOPPLER: Transaortic velocity was within the normal  range. There was no evidence for stenosis. There was no significant  regurgitation.    TRICUSPID VALVE: The valve structure was normal. There was normal leaflet  separation. DOPPLER: The transtricuspid velocity was within the normal range.  There was no evidence for stenosis. There was mild to moderate regurgitation.  Estimated peak PA pressure was 25 mmHg.    PULMONIC VALVE: Not well visualized. DOPPLER: The transpulmonic velocity was  within the normal range. There was trace regurgitation.    PERICARDIUM: There was no pericardial effusion. The pericardium was normal  in  appearance.    AORTA: The root exhibited normal size.    SYSTEMIC VEINS: IVC: The inferior vena cava was normal in size. Respirophasic  changes were blunted (less than 50% variation).    MEASUREMENT TABLES    OTHER ECHO MEASUREMENTS  (Reference normals)  Estimated CVP   8 mmHg   (--)    SYSTEM MEASUREMENT TABLES    2D mode  AV Diam: 3.5 cm  AoR Diam; Mean (2D): 3.5 cm  LA Diam (2D): 3.4 cm  LA Dimension; Mean (2D): 3.4 cm  LA/Ao (2D): 0.97  EDV (2D-Cubed): 41.8 cm3  EF (2D-Cubed): 77.5 %  ESV (2D-Cubed): 9.39 cm3  FS (2D-Cubed): 39.2 %  FS (2D-Teich): 39.2 %  IVS/LVPW (2D): 1.09  IVSd (2D): 0.9 cm  IVSd; Mean chosen (2D): 0.9 cm  LVIDd (2D): 3.47 cm  LVIDd; Mean (2D): 3.47 cm  LVIDs (2D): 2.11 cm  LVIDs; Mean (2D): 2.11 cm  LVPWd (2D): 0.83 cm  LVPWd; Mean (2D): 0.83 cm  Left Ventricular Ejection Fraction; Teichholz; 2D mode;: 70.7 %  Left Ventricular End Diastolic Volume; Teichholz; 2D mode;: 49.8 cm3  Left Ventricular End Systolic Volume; Teichholz; 2D mode;: 14.6 cm3  SV (2D-Cubed): 32.4 cm3  Stroke Volume; Teichholz; 2D mode;: 35.2 cm3  RVIDd (2D): 3.59 cm  RVIDd; Mean (2D): 3.59 cm    Apical four chamber  LV MOD Diam; Recent value; End Diastole (A4C): 1.85 cm  LV MOD Diam; Recent value; End Diastole (A4C): 4.14 cm  LV MOD Diam; Recent value; End Diastole (A4C): 3.84 cm  LV MOD Diam; Recent value; End Diastole (A4C): 4.24 cm  LV MOD Diam; Recent value; End Diastole (A4C): 4.26 cm  LV MOD Diam; Recent value; End Diastole (A4C): 4.21 cm  LV MOD Diam; Recent value; End Diastole (A4C): 4.03 cm  LV MOD Diam; Recent value; End Diastole (A4C): 1.5 cm  LV MOD Diam; Recent value; End Diastole (A4C): 2.24 cm  LV MOD Diam; Recent value; End Diastole (A4C): 2.71 cm  LV MOD Diam; Recent value; End Diastole (A4C): 3.03 cm  LV MOD Diam; Recent value; End Diastole (A4C): 3.27 cm  LV MOD Diam; Recent value; End Diastole (A4C): 3.52 cm  LV MOD Diam; Recent value; End Diastole (A4C): 3.74 cm  LV MOD Diam; Recent value; End  Diastole (A4C): 3.89 cm  LV MOD Diam; Recent value; End Diastole (A4C): 3.96 cm  LV MOD Diam; Recent value; End Diastole (A4C): 3.99 cm  LV MOD Diam; Recent value; End Diastole (A4C): 4.11 cm  LV MOD Diam; Recent value; End Diastole (A4C): 4.24 cm  LV MOD Diam; Recent value; End Diastole (A4C): 4.26 cm  LV MOD Diam; Recent value; End Systole (A4C): 1.65 cm  LV MOD Diam; Recent value; End Systole (A4C): 3.06 cm  LV MOD Diam; Recent value; End Systole (A4C): 3.06 cm  LV MOD Diam; Recent value; End Systole (A4C): 2.92 cm  LV MOD Diam; Recent value; End Systole (A4C): 2.72 cm  LV MOD Diam; Recent value; End Systole (A4C): 1.75 cm  LV MOD Diam; Recent value; End Systole (A4C): 2.02 cm  LV MOD Diam; Recent value; End Systole (A4C): 2.21 cm  LV MOD Diam; Recent value; End Systole (A4C): 2.36 cm  LV MOD Diam; Recent value; End Systole (A4C): 2.48 cm  LV MOD Diam; Recent value; End Systole (A4C): 2.55 cm  LV MOD Diam; Recent value; End Systole (A4C): 2.55 cm  LV MOD Diam; Recent value; End Systole (A4C): 2.6 cm  LV MOD Diam; Recent value; End Systole (A4C): 2.65 cm  LV MOD Diam; Recent value; End Systole (A4C): 0.83 cm  LV MOD Diam; Recent value; End Systole (A4C): 1.19 cm  LV MOD Diam; Recent value; End Systole (A4C): 1.51 cm  LV MOD Diam; Recent value; End Systole (A4C): 2.8 cm  LV MOD Diam; Recent value; End Systole (A4C): 3.01 cm  LV MOD Diam; Recent value; End Systole (A4C): 3.06 cm  LVEF MOD A4C: 68.8 %  Left Ventricle diastolic major axis; Most recent value chosen; Method of Disks,  Single Plane; 2D mode; Apical four chamber;: 7.88 cm Left Ventricle systolic  major axis; Most recent value chosen; Method of Disks, Single Plane; 2D mode;  Apical four chamber;: 5.52 cm Left Ventricular Diastolic Area; Most recent  value chosen; Method of Disks, Single Plane; 2D mode; Apical four chamber;:  2810 mm2 Left Ventricular End Diastolic Volume; Most recent value chosen;  Method of Disks, Single Plane; 2D mode; Apical four  chamber;: 82.2 cm3 Left  Ventricular End Systolic Volume; Most recent value chosen; Method of Disks,  Single Plane; 2D mode; Apical four chamber;: 25.7 cm3 Left Ventricular Systolic  Area; Most recent value chosen; Method of Disks, Single Plane; 2D mode; Apical  four chamber;: 1300 mm2  SV MOD A4C: 56.6 cm3    Tissue Doppler Imaging  LV Peak Early Vo Tissue Zafar; Medial MA (TDI): 69.6 mm/s  Left Ventricular Peak Early Diastolic Tissue Velocity; Mean; Mean value chosen;  Medial Mitral Annulus; Tissue Doppler Imaging;: 69.6 mm/s    Unspecified Scan Mode  MV Peak Zafar/LV Peak Tissue Zafar E-Wave; Medial MA: 12.4  Dec Wayne; Antegrade Flow: 5140 mm/s2  Dec Wayne; Mean; Antegrade Flow: 5140 mm/s2  MV A Zafar: 762 mm/s  MV E Zafar: 863 mm/s  MV E/A Ratio: 1.1  MV Peak A Zafar: 762 mm/s  MV Peak E Zafar; Mean; Antegrade Flow: 863 mm/s  Peak Grad; Mean; Regurgitant Flow: 21 mm[Hg]  Vmax; Mean; Regurgitant Flow: 2300 mm/s  Vmax; Regurgitant Flow: 2390 mm/s  Vmax; Regurgitant Flow: 2200 mm/s    [employer] Accredited Echocardiography Laboratory    Prepared and electronically signed by    Malcolm Quinn MD  Signed 05-Feb-2016 17:39:45    No results found for this or any previous visit.    No results found for this or any previous visit.    No results found for this or any previous visit.

## 2025-04-29 ENCOUNTER — HOSPITAL ENCOUNTER (OUTPATIENT)
Dept: RADIOLOGY | Facility: MEDICAL CENTER | Age: 80
Discharge: HOME | End: 2025-04-29
Payer: COMMERCIAL

## 2025-04-29 VITALS — HEIGHT: 61 IN | BODY MASS INDEX: 28.97 KG/M2

## 2025-04-29 DIAGNOSIS — Z12.31 ENCOUNTER FOR SCREENING MAMMOGRAM FOR MALIGNANT NEOPLASM OF BREAST: ICD-10-CM

## 2025-04-29 PROCEDURE — 77063 BREAST TOMOSYNTHESIS BI: CPT

## 2025-04-29 PROCEDURE — 77067 SCR MAMMO BI INCL CAD: CPT

## 2025-05-07 ENCOUNTER — RA CDI HCC (OUTPATIENT)
Dept: OTHER | Facility: HOSPITAL | Age: 80
End: 2025-05-07

## 2025-05-14 ENCOUNTER — OFFICE VISIT (OUTPATIENT)
Dept: FAMILY MEDICINE CLINIC | Facility: CLINIC | Age: 80
End: 2025-05-14
Payer: COMMERCIAL

## 2025-05-14 VITALS
WEIGHT: 155.4 LBS | DIASTOLIC BLOOD PRESSURE: 74 MMHG | OXYGEN SATURATION: 100 % | TEMPERATURE: 97.9 F | BODY MASS INDEX: 29.34 KG/M2 | RESPIRATION RATE: 16 BRPM | SYSTOLIC BLOOD PRESSURE: 118 MMHG | HEIGHT: 61 IN | HEART RATE: 63 BPM

## 2025-05-14 DIAGNOSIS — Z13.820 ENCOUNTER FOR OSTEOPOROSIS SCREENING IN ASYMPTOMATIC POSTMENOPAUSAL PATIENT: ICD-10-CM

## 2025-05-14 DIAGNOSIS — N39.41 URGE INCONTINENCE OF URINE: ICD-10-CM

## 2025-05-14 DIAGNOSIS — E78.5 MILD HYPERLIPIDEMIA: ICD-10-CM

## 2025-05-14 DIAGNOSIS — E03.8 SUBCLINICAL HYPOTHYROIDISM: ICD-10-CM

## 2025-05-14 DIAGNOSIS — R73.01 IFG (IMPAIRED FASTING GLUCOSE): ICD-10-CM

## 2025-05-14 DIAGNOSIS — Z12.31 ENCOUNTER FOR SCREENING MAMMOGRAM FOR BREAST CANCER: ICD-10-CM

## 2025-05-14 DIAGNOSIS — Z78.0 ENCOUNTER FOR OSTEOPOROSIS SCREENING IN ASYMPTOMATIC POSTMENOPAUSAL PATIENT: ICD-10-CM

## 2025-05-14 DIAGNOSIS — I25.10 MILD CAD: ICD-10-CM

## 2025-05-14 DIAGNOSIS — I10 PRIMARY HYPERTENSION: Primary | ICD-10-CM

## 2025-05-14 PROBLEM — R35.0 INCREASED URINARY FREQUENCY: Status: RESOLVED | Noted: 2020-09-23 | Resolved: 2025-05-14

## 2025-05-14 PROBLEM — R42 EPISODIC LIGHTHEADEDNESS: Status: RESOLVED | Noted: 2019-08-23 | Resolved: 2025-05-14

## 2025-05-14 PROCEDURE — G2211 COMPLEX E/M VISIT ADD ON: HCPCS | Performed by: FAMILY MEDICINE

## 2025-05-14 PROCEDURE — 99214 OFFICE O/P EST MOD 30 MIN: CPT | Performed by: FAMILY MEDICINE

## 2025-05-14 NOTE — PROGRESS NOTES
"Name: Luis Kelly      : 1945      MRN: [id number]  Encounter Provider: Shannon Reis DO  Encounter Date: 2025   Encounter department: [facility]    :  Assessment & Plan  Primary hypertension  Cont recurrent   Orders:  •  CBC; Future  •  Comprehensive metabolic panel; Future    Encounter for screening mammogram for breast cancer    Orders:  •  Mammo screening bilateral w 3d and cad; Future    Mild hyperlipidemia    Orders:  •  Lipid Panel with Direct LDL reflex; Future    Subclinical hypothyroidism    Orders:  •  TSH, 3rd generation with Free T4 reflex; Future    IFG (impaired fasting glucose)  Check aic  Orders:  •  Hemoglobin A1C With EAG; Future    Encounter for osteoporosis screening in asymptomatic postmenopausal patient    Orders:  •  DXA bone density spine hip and pelvis; Future    Mild CAD  Nonobstructive cad  Cont lipitor, asa and metoprolol       Urge incontinence of urine  Stable on vesicare         Assessment & Plan             History of Present Illness     History of Present Illness  Here for follow up  Had possible covid like symptoms in August  Developed chest pain and went into ER  Worse with walking  Had echo, and cardiac cath, 40 percent on RCA     Review of Systems   Constitutional: Negative.    HENT: Negative.     Eyes: Negative.    Respiratory: Negative.     Cardiovascular:  Positive for chest pain (with uphill walking).   Gastrointestinal: Negative.    Endocrine: Negative.    Genitourinary: Negative.    Musculoskeletal: Negative.    Allergic/Immunologic: Negative.    Neurological: Negative.    Hematological: Negative.    Psychiatric/Behavioral: Negative.       Objective   /74 (BP Location: Left arm, Patient Position: Sitting, Cuff Size: Standard)   Pulse 63   Temp 97.9 °F (36.6 °C) (Tympanic)   Resp 16   Ht 5' 1\" (1.549 m)   Wt 70.5 kg (155 lb 6.4 oz)   LMP  (LMP Unknown)   SpO2 100%   BMI 29.36 kg/m²     Physical Exam    Physical " Exam  Vitals and nursing note reviewed.   Constitutional:       Appearance: Normal appearance. She is well-developed.   HENT:      Head: Normocephalic and atraumatic.      Right Ear: External ear normal.      Left Ear: External ear normal.      Nose: Nose normal.     Eyes:      Conjunctiva/sclera: Conjunctivae normal.      Pupils: Pupils are equal, round, and reactive to light.       Cardiovascular:      Rate and Rhythm: Normal rate and regular rhythm.      Heart sounds: Normal heart sounds.   Pulmonary:      Effort: Pulmonary effort is normal.      Breath sounds: Normal breath sounds.   Abdominal:      General: Bowel sounds are normal.      Palpations: Abdomen is soft.     Musculoskeletal:         General: Normal range of motion.      Cervical back: Normal range of motion and neck supple.     Skin:     General: Skin is warm and dry.      Capillary Refill: Capillary refill takes less than 2 seconds.     Neurological:      Mental Status: She is alert and oriented to person, place, and time.     Psychiatric:         Behavior: Behavior normal.         Thought Content: Thought content normal.         Judgment: Judgment normal.

## 2025-07-18 DIAGNOSIS — I10 ESSENTIAL HYPERTENSION: ICD-10-CM

## 2025-07-18 DIAGNOSIS — N39.41 URGE INCONTINENCE OF URINE: ICD-10-CM

## 2025-07-18 RX ORDER — VALSARTAN AND HYDROCHLOROTHIAZIDE 320; 25 MG/1; MG/1
1 TABLET, FILM COATED ORAL DAILY
Qty: 90 TABLET | Refills: 1 | Status: SHIPPED | OUTPATIENT
Start: 2025-07-18

## 2025-07-18 RX ORDER — SOLIFENACIN SUCCINATE 10 MG/1
10 TABLET, FILM COATED ORAL DAILY
Qty: 90 TABLET | Refills: 1 | Status: SHIPPED | OUTPATIENT
Start: 2025-07-18

## 2025-07-18 NOTE — TELEPHONE ENCOUNTER
Message sent via [employer].     Dr. ELOISE Woodall  I will now be needing new scripts for   Valsartan 320/25  Solifenacin 10 mg     We always do 90 day supply on both with three refills.     Thank you.

## (undated) DEVICE — CATH DIAG 5FR IMPULSE 110CM PIG

## (undated) DEVICE — TR BAND RADIAL ARTERY COMPRESSION DEVICE: Brand: TR BAND

## (undated) DEVICE — GUIDEWIRE WHOLEY HI TORQUE INTERM MOD J .035 145CM

## (undated) DEVICE — RADIFOCUS OPTITORQUE ANGIOGRAPHIC CATHETER: Brand: OPTITORQUE

## (undated) DEVICE — GLIDESHEATH SLENDER STAINLESS STEEL KIT: Brand: GLIDESHEATH SLENDER

## (undated) DEVICE — DGW .035 FC J3MM 260CM TEF: Brand: EMERALD